# Patient Record
Sex: MALE | Race: WHITE | NOT HISPANIC OR LATINO | Employment: UNEMPLOYED | URBAN - METROPOLITAN AREA
[De-identification: names, ages, dates, MRNs, and addresses within clinical notes are randomized per-mention and may not be internally consistent; named-entity substitution may affect disease eponyms.]

---

## 2018-06-18 ENCOUNTER — OFFICE VISIT (OUTPATIENT)
Dept: FAMILY MEDICINE | Facility: CLINIC | Age: 58
End: 2018-06-18
Payer: COMMERCIAL

## 2018-06-18 VITALS
HEIGHT: 67 IN | WEIGHT: 177 LBS | DIASTOLIC BLOOD PRESSURE: 100 MMHG | BODY MASS INDEX: 27.78 KG/M2 | TEMPERATURE: 99 F | HEART RATE: 80 BPM | RESPIRATION RATE: 16 BRPM | SYSTOLIC BLOOD PRESSURE: 158 MMHG | OXYGEN SATURATION: 97 %

## 2018-06-18 DIAGNOSIS — Z72.0 TOBACCO ABUSE: ICD-10-CM

## 2018-06-18 DIAGNOSIS — R07.89 ATYPICAL CHEST PAIN: ICD-10-CM

## 2018-06-18 DIAGNOSIS — Z00.00 VISIT FOR WELL MAN HEALTH CHECK: Primary | ICD-10-CM

## 2018-06-18 DIAGNOSIS — M54.16 LUMBAR RADICULOPATHY: ICD-10-CM

## 2018-06-18 DIAGNOSIS — I10 BENIGN ESSENTIAL HTN: ICD-10-CM

## 2018-06-18 PROCEDURE — 93005 ELECTROCARDIOGRAM TRACING: CPT | Mod: S$GLB,,, | Performed by: FAMILY MEDICINE

## 2018-06-18 PROCEDURE — 99999 PR PBB SHADOW E&M-EST. PATIENT-LVL IV: CPT | Mod: PBBFAC,,, | Performed by: FAMILY MEDICINE

## 2018-06-18 PROCEDURE — 93010 ELECTROCARDIOGRAM REPORT: CPT | Mod: S$GLB,,, | Performed by: INTERNAL MEDICINE

## 2018-06-18 PROCEDURE — 3080F DIAST BP >= 90 MM HG: CPT | Mod: CPTII,S$GLB,, | Performed by: FAMILY MEDICINE

## 2018-06-18 PROCEDURE — 99386 PREV VISIT NEW AGE 40-64: CPT | Mod: S$GLB,,, | Performed by: FAMILY MEDICINE

## 2018-06-18 PROCEDURE — 3077F SYST BP >= 140 MM HG: CPT | Mod: CPTII,S$GLB,, | Performed by: FAMILY MEDICINE

## 2018-06-18 RX ORDER — HYDROCODONE BITARTRATE AND ACETAMINOPHEN 10; 325 MG/1; MG/1
1 TABLET ORAL EVERY 8 HOURS PRN
Qty: 30 TABLET | Refills: 0 | Status: SHIPPED | OUTPATIENT
Start: 2018-06-18 | End: 2018-10-17 | Stop reason: SDUPTHER

## 2018-06-18 RX ORDER — LISINOPRIL AND HYDROCHLOROTHIAZIDE 12.5; 2 MG/1; MG/1
1 TABLET ORAL DAILY
Qty: 90 TABLET | Refills: 3 | Status: ON HOLD | OUTPATIENT
Start: 2018-06-18 | End: 2018-08-18 | Stop reason: HOSPADM

## 2018-06-18 RX ORDER — IBUPROFEN 200 MG
200 TABLET ORAL EVERY 6 HOURS PRN
COMMUNITY
End: 2018-10-17

## 2018-06-18 NOTE — PROGRESS NOTES
"Well Man VISIT      CHIEF COMPLAINT  Chief Complaint   Patient presents with    \[facility] [place] [facility]\"" Care       HPI  Anjum Campbell is a 58 y.o. male who presents for physical.     Social Factors  Tobacco use: Yes 1ppd  Ready to Quit: No  Alcohol: Yes on occasion  Intimate partner violence screening  "Do you feel safe in your current relationship?" Yes   "Have you ever been in a relationship in which your partner frightened you or hurt you?" No  Living Will/POA: No  Regular Exercise: No    Depression  Over the past two weeks, have you felt down, depressed, or hopeless? No  Over the past two weeks, have you felt little interest or pleasure in doing things? No    Reproductive Health  STD screening in last year: deferred  HIV screening: deferred    Screen for Chronic Disease  CHD Risk Factors: advanced age (older than 55 for men, 65 for women), male gender and smoking/ tobacco exposure  Estimated body mass index is 27.73 kg/m² as calculated from the following:    Height as of this encounter: 5' 7" (1.702 m).    Weight as of this encounter: 80.3 kg (177 lb 0.5 oz).  Dyslipidemia screening needed: order 6/18/18  T2DM screening needed: order 6/18/18  Colonoscopy needed: order 6/18/18  PSA needed: n/a  AAA screening needed:n/a  Screen men 35 years and older, and men 20 to 34 years of age who have cardiovascular risk factors for dyslipidemia  Begin screening colonoscopies at 50 years of age in men of average risk, and continue until 75 years of age; offer fecal occult blood testing every year, flexible sigmoidoscopy every five years combined with fecal occult blood testing every three years, or colonoscopy every 10 years   The American Urological Association recommends offering PSA testing and digital rectal examination to well-informed men beginning at 40 years of age and continuing until life expectancy is less than 10 years  Screen once with ultrasonography in men 65 to 75 years of age if they have a family history or have " "smoked at gyjyi049 cigarettes in their lifetime  Screen men with a sustained blood pressure greater than 135/80 mm Hg for T2DM      Immunizations  delayed    ALLERGIES and MEDS were verified.   PMHx, PSHx, FHx, SOCIALHx were updated as pertinent.    REVIEW OF SYSTEMS  Review of Systems   Constitutional: Negative.    HENT: Negative.    Eyes: Negative.    Respiratory: Negative.    Cardiovascular: Positive for chest pain.   Gastrointestinal: Negative.    Genitourinary: Negative.    Musculoskeletal: Negative.    Skin: Negative.    Neurological: Negative.          PHYSICAL EXAM  VITAL SIGNS: BP (!) 158/100 (BP Location: Left arm, Patient Position: Sitting, BP Method: Medium (Manual))   Pulse 80   Temp 98.7 °F (37.1 °C) (Oral)   Resp 16   Ht 5' 7" (1.702 m)   Wt 80.3 kg (177 lb 0.5 oz)   SpO2 97%   BMI 27.73 kg/m²   GEN: Well developed, Well nourished, No acute distress.  HENT: Normocephalic, Atraumatic, Bilateral external ears normal, Nose normal, Oropharynx moist, No oral exudates.   Eyes: PERRLA, EOMI, Conjunctiva normal, No discharge.   Neck: Supple, No tenderness.  Lymphatic: No cervical or supraclavicular lymphadenopathy noted.   Cardiovascular: Normal heart rate, Normal rhythm, No murmurs, No rubs, No gallops.   Thorax & Lungs: Normal breath sounds, No respiratory distress, No wheezing.  Abdomen: Soft, No tenderness, Bowel sounds normal.  Genital: deferred   Skin: Warm, Dry, No erythema, No rash.   Extremities: No edema, No tenderness.       ASSESSMENT/PLAN    Anjum was seen today for establish care.    Diagnoses and all orders for this visit:    Visit for [facility] health check  -     Lipid panel; Future  -     CBC auto differential; Future  -     Comprehensive metabolic panel; Future  -     URINALYSIS; Future  -     Hepatitis C antibody; Future  -     Case request GI: COLONOSCOPY  -     (In Office Administered) Tdap Vaccine  -     EKG 12-lead    Benign essential HTN  -     (In Office Administered) Tdap " Vaccine  -     lisinopril-hydrochlorothiazide (PRINZIDE,ZESTORETIC) 20-12.5 mg per tablet; Take 1 tablet by mouth once daily.    Tobacco abuse    Atypical chest pain  -     Cancel: NM Myocardial Perfusion Spect Multi Exer; Future  -     Cancel: NM Multi Tread Stress Cardiac Component; Future  -     Ambulatory referral to Cardiology    Lumbar radiculopathy  -     HYDROcodone-acetaminophen (NORCO)  mg per tablet; Take 1 tablet by mouth every 8 (eight) hours as needed.         FOLLOW UP: 3 months or sooner if needed

## 2018-06-19 ENCOUNTER — PATIENT MESSAGE (OUTPATIENT)
Dept: FAMILY MEDICINE | Facility: CLINIC | Age: 58
End: 2018-06-19

## 2018-06-19 NOTE — TELEPHONE ENCOUNTER
Contacted wife re: her questions. She stated she was told her 's stress test was tomorrow but realized it was cancelled and he only has appt with cardiologist. Patient is upset due to all the confusion. I contacted wife back and told her according to Dr. Doe's office notes, the stress test was cancelled and patient has cardiology appt for tomorrow.     Notified Dr. Doe.

## 2018-06-19 NOTE — TELEPHONE ENCOUNTER
----- Message from Kassandra Horton sent at 6/19/2018 10:28 AM CDT -----  Contact: Wife - Barb  Patient's wife says she need to speak with you regarding patient's appointment with Dr. Trevizo and testing. Please call at  [phone].

## 2018-06-20 ENCOUNTER — OFFICE VISIT (OUTPATIENT)
Dept: CARDIOLOGY | Facility: CLINIC | Age: 58
End: 2018-06-20
Payer: COMMERCIAL

## 2018-06-20 ENCOUNTER — LAB VISIT (OUTPATIENT)
Dept: LAB | Facility: HOSPITAL | Age: 58
End: 2018-06-20
Attending: FAMILY MEDICINE
Payer: COMMERCIAL

## 2018-06-20 VITALS
RESPIRATION RATE: 20 BRPM | DIASTOLIC BLOOD PRESSURE: 88 MMHG | HEART RATE: 86 BPM | SYSTOLIC BLOOD PRESSURE: 140 MMHG | BODY MASS INDEX: 27.62 KG/M2 | WEIGHT: 176.38 LBS | OXYGEN SATURATION: 96 %

## 2018-06-20 DIAGNOSIS — R07.9 CHEST PAIN, UNSPECIFIED TYPE: Primary | ICD-10-CM

## 2018-06-20 DIAGNOSIS — I10 ESSENTIAL HYPERTENSION: ICD-10-CM

## 2018-06-20 DIAGNOSIS — E78.5 DYSLIPIDEMIA: ICD-10-CM

## 2018-06-20 DIAGNOSIS — Z91.89 AT RISK FOR CORONARY ARTERY DISEASE: ICD-10-CM

## 2018-06-20 DIAGNOSIS — M51.34 DDD (DEGENERATIVE DISC DISEASE), THORACIC: ICD-10-CM

## 2018-06-20 DIAGNOSIS — Z72.0 TOBACCO ABUSE: ICD-10-CM

## 2018-06-20 DIAGNOSIS — Z00.00 VISIT FOR WELL MAN HEALTH CHECK: ICD-10-CM

## 2018-06-20 LAB
ALBUMIN SERPL BCP-MCNC: 4.1 G/DL
ALP SERPL-CCNC: 74 U/L
ALT SERPL W/O P-5'-P-CCNC: 19 U/L
ANION GAP SERPL CALC-SCNC: 6 MMOL/L
AST SERPL-CCNC: 16 U/L
BASOPHILS # BLD AUTO: 0.06 K/UL
BASOPHILS NFR BLD: 0.7 %
BILIRUB SERPL-MCNC: 0.7 MG/DL
BUN SERPL-MCNC: 18 MG/DL
CALCIUM SERPL-MCNC: 9.9 MG/DL
CHLORIDE SERPL-SCNC: 100 MMOL/L
CHOLEST SERPL-MCNC: 202 MG/DL
CHOLEST/HDLC SERPL: 5.5 {RATIO}
CO2 SERPL-SCNC: 31 MMOL/L
CREAT SERPL-MCNC: 1.1 MG/DL
DIFFERENTIAL METHOD: NORMAL
EOSINOPHIL # BLD AUTO: 0.3 K/UL
EOSINOPHIL NFR BLD: 3.7 %
ERYTHROCYTE [DISTWIDTH] IN BLOOD BY AUTOMATED COUNT: 14 %
EST. GFR  (AFRICAN AMERICAN): >60 ML/MIN/1.73 M^2
EST. GFR  (NON AFRICAN AMERICAN): >60 ML/MIN/1.73 M^2
GLUCOSE SERPL-MCNC: 83 MG/DL
HCT VFR BLD AUTO: 50.3 %
HDLC SERPL-MCNC: 37 MG/DL
HDLC SERPL: 18.3 %
HGB BLD-MCNC: 16.8 G/DL
LDLC SERPL CALC-MCNC: 132.2 MG/DL
LYMPHOCYTES # BLD AUTO: 2.9 K/UL
LYMPHOCYTES NFR BLD: 34.7 %
MCH RBC QN AUTO: 29.7 PG
MCHC RBC AUTO-ENTMCNC: 33.4 G/DL
MCV RBC AUTO: 89 FL
MONOCYTES # BLD AUTO: 0.8 K/UL
MONOCYTES NFR BLD: 9.3 %
NEUTROPHILS # BLD AUTO: 4.4 K/UL
NEUTROPHILS NFR BLD: 51.4 %
NONHDLC SERPL-MCNC: 165 MG/DL
PLATELET # BLD AUTO: 253 K/UL
PMV BLD AUTO: 10.4 FL
POTASSIUM SERPL-SCNC: 4.1 MMOL/L
PROT SERPL-MCNC: 7.3 G/DL
RBC # BLD AUTO: 5.65 M/UL
SODIUM SERPL-SCNC: 137 MMOL/L
TRIGL SERPL-MCNC: 164 MG/DL
WBC # BLD AUTO: 8.47 K/UL

## 2018-06-20 PROCEDURE — 3079F DIAST BP 80-89 MM HG: CPT | Mod: CPTII,S$GLB,, | Performed by: INTERNAL MEDICINE

## 2018-06-20 PROCEDURE — 99204 OFFICE O/P NEW MOD 45 MIN: CPT | Mod: S$GLB,,, | Performed by: INTERNAL MEDICINE

## 2018-06-20 PROCEDURE — 80053 COMPREHEN METABOLIC PANEL: CPT

## 2018-06-20 PROCEDURE — 80061 LIPID PANEL: CPT

## 2018-06-20 PROCEDURE — 3008F BODY MASS INDEX DOCD: CPT | Mod: CPTII,S$GLB,, | Performed by: INTERNAL MEDICINE

## 2018-06-20 PROCEDURE — 86803 HEPATITIS C AB TEST: CPT

## 2018-06-20 PROCEDURE — 3077F SYST BP >= 140 MM HG: CPT | Mod: CPTII,S$GLB,, | Performed by: INTERNAL MEDICINE

## 2018-06-20 PROCEDURE — 85025 COMPLETE CBC W/AUTO DIFF WBC: CPT

## 2018-06-20 PROCEDURE — 36415 COLL VENOUS BLD VENIPUNCTURE: CPT | Mod: PN

## 2018-06-20 PROCEDURE — 99999 PR PBB SHADOW E&M-EST. PATIENT-LVL III: CPT | Mod: PBBFAC,,, | Performed by: INTERNAL MEDICINE

## 2018-06-20 RX ORDER — NITROGLYCERIN 0.4 MG/1
0.4 TABLET SUBLINGUAL EVERY 5 MIN PRN
Qty: 60 TABLET | Refills: 12 | Status: ON HOLD | OUTPATIENT
Start: 2018-06-20 | End: 2018-08-18 | Stop reason: HOSPADM

## 2018-06-20 NOTE — PROGRESS NOTES
Subjective:    Patient ID:  Anjum Campbell is a 58 y.o. male who presents for evaluation of Establish Care (abnormal ekg)      HPI  Patient is here for evaluation of chest pain.  He says his been gone for a few weeks is noticeably more prominently occurring almost a point daily episodes.  He has substernal chest heaviness with radiation into his jaw.  He has some associated shortness breath as well.  This can occur with exertion and rest.  He says not particularly after meals.  He really hasn't tried anything to make it better or worse.  He denies any PND, orthopnea or lower edema.  He's not expressing dizziness, presyncope or syncope.  He's never had any previous cardiac issues or testing done.  Says he smokes almost 3/4 of a pack a day.      Review of Systems   Constitution: Negative.   HENT: Negative.    Eyes: Negative.    Cardiovascular: Positive for chest pain. Negative for dyspnea on exertion, irregular heartbeat, leg swelling, near-syncope, orthopnea, palpitations, paroxysmal nocturnal dyspnea and syncope.   Respiratory: Negative for shortness of breath.    Skin: Negative.    Musculoskeletal: Negative.    Gastrointestinal: Negative for abdominal pain, constipation and diarrhea.   Genitourinary: Negative for dysuria.   Neurological: Negative for dizziness.   Psychiatric/Behavioral: Negative.      Past Medical History:   Diagnosis Date    Back injury 1994    Hypertension      Past Surgical History:   Procedure Laterality Date    back fusion   1995    Lumber surgery  1995     Social History   Substance Use Topics    Smoking status: Current Every Day Smoker     Packs/day: 1.00     Years: 30.00     Types: Cigarettes    Smokeless tobacco: Not on file    Alcohol use Yes      Comment: occaSIONAL     Family History   Problem Relation Age of Onset    Cancer Father         Objective:    Physical Exam   Constitutional: He is oriented to person, place, and time. He appears well-developed and well-nourished. No  distress.   HENT:   Head: Normocephalic and atraumatic.   Eyes: Conjunctivae and EOM are normal. Pupils are equal, round, and reactive to light.   Neck: Normal range of motion. Neck supple. No thyromegaly present.   Cardiovascular: Normal rate, regular rhythm and normal heart sounds.    No murmur heard.  Pulmonary/Chest: Effort normal and breath sounds normal. No respiratory distress. He has no wheezes. He has no rales. He exhibits no tenderness.   Abdominal: Soft. Bowel sounds are normal.   Musculoskeletal: He exhibits no edema.   Neurological: He is alert and oriented to person, place, and time.   Skin: Skin is warm and dry.   Psychiatric: He has a normal mood and affect. His behavior is normal.       EKG shows normal sinus rhythm with nonspecific ST-T changes    Assessment:       1. Chest pain, unspecified type    2. Essential hypertension    3. Dyslipidemia    4. DDD (degenerative disc disease), thoracic    5. Tobacco abuse    6. At risk for coronary artery disease         Plan:       -Very typical anginal symptoms, plan for testing ASAP  *Cannot exercise secondary to chronic degenerative arthritic issues  -Counseled on tobacco cessation  -When necessary nitroglycerin  -ED for worsening symptoms    Return to clinic in one month with testing ASAP

## 2018-06-20 NOTE — LETTER
June 20, 2018      Enmanuel Doe MD  [address]  [place] [address]           [facility]  [address]  [place] [address]  Phone: [phone]          Patient: Anjum Campbell   MR Number: [id number]   YOB: 1960   Date of Visit: 6/20/2018       Dear Dr. Enmanuel MARTINEZ Page:    Thank you for referring Anjum Campbell to me for evaluation. Attached you will find relevant portions of my assessment and plan of care.    If you have questions, please do not hesitate to call me. I look forward to following Anjum Campbell along with you.    Sincerely,    Gerardo Trevizo MD    Enclosure  CC:  No Recipients    If you would like to receive this communication electronically, please contact [email or web address] or [phone] to request more information on [employer] access.    For providers and/or their staff who would like to refer a patient to [facility], please contact us through our one-stop-shop provider referral line, [facility], at [phone].    If you feel you have received this communication in error or would no longer like to receive these types of communications, please e-mail [email or web address]

## 2018-06-21 DIAGNOSIS — I10 BENIGN ESSENTIAL HTN: ICD-10-CM

## 2018-06-21 DIAGNOSIS — E78.00 PURE HYPERCHOLESTEROLEMIA: Primary | ICD-10-CM

## 2018-06-21 PROBLEM — E78.5 HYPERLIPIDEMIA: Status: ACTIVE | Noted: 2018-06-21

## 2018-06-21 RX ORDER — ATORVASTATIN CALCIUM 40 MG/1
40 TABLET, FILM COATED ORAL DAILY
Qty: 90 TABLET | Refills: 3 | Status: ON HOLD | OUTPATIENT
Start: 2018-06-21 | End: 2018-08-18 | Stop reason: SDUPTHER

## 2018-06-22 ENCOUNTER — HOSPITAL ENCOUNTER (OUTPATIENT)
Dept: CARDIOLOGY | Facility: HOSPITAL | Age: 58
Discharge: HOME | End: 2018-06-22
Attending: INTERNAL MEDICINE
Payer: COMMERCIAL

## 2018-06-22 ENCOUNTER — HOSPITAL ENCOUNTER (OUTPATIENT)
Dept: RADIOLOGY | Facility: HOSPITAL | Age: 58
Discharge: HOME | End: 2018-06-22
Attending: INTERNAL MEDICINE
Payer: COMMERCIAL

## 2018-06-22 DIAGNOSIS — R07.9 CHEST PAIN, UNSPECIFIED TYPE: ICD-10-CM

## 2018-06-22 DIAGNOSIS — Z91.89 AT RISK FOR CORONARY ARTERY DISEASE: ICD-10-CM

## 2018-06-22 LAB
DIASTOLIC DYSFUNCTION: NO
DIASTOLIC DYSFUNCTION: YES
ESTIMATED PA SYSTOLIC PRESSURE: 21.54
GLOBAL PERICARDIAL EFFUSION: ABNORMAL
HCV AB SERPL QL IA: NEGATIVE
MITRAL VALVE MOBILITY: NORMAL
RETIRED EF AND QEF - SEE NOTES: 30 (ref 55–65)

## 2018-06-22 PROCEDURE — A9502 TC99M TETROFOSMIN: HCPCS

## 2018-06-22 PROCEDURE — 93306 TTE W/DOPPLER COMPLETE: CPT | Mod: 26,,, | Performed by: INTERNAL MEDICINE

## 2018-06-22 PROCEDURE — 78452 HT MUSCLE IMAGE SPECT MULT: CPT | Mod: 26,,, | Performed by: INTERNAL MEDICINE

## 2018-06-22 PROCEDURE — 63600175 PHARM REV CODE 636 W HCPCS

## 2018-06-22 PROCEDURE — 93016 CV STRESS TEST SUPVJ ONLY: CPT | Mod: ,,, | Performed by: INTERNAL MEDICINE

## 2018-06-22 PROCEDURE — 93017 CV STRESS TEST TRACING ONLY: CPT

## 2018-06-22 PROCEDURE — 93306 TTE W/DOPPLER COMPLETE: CPT

## 2018-06-22 PROCEDURE — 93018 CV STRESS TEST I&R ONLY: CPT | Mod: ,,, | Performed by: INTERNAL MEDICINE

## 2018-06-22 RX ORDER — REGADENOSON 0.08 MG/ML
INJECTION, SOLUTION INTRAVENOUS
Status: DISPENSED
Start: 2018-06-22 | End: 2018-06-22

## 2018-06-25 ENCOUNTER — OFFICE VISIT (OUTPATIENT)
Dept: CARDIOLOGY | Facility: CLINIC | Age: 58
End: 2018-06-25
Payer: COMMERCIAL

## 2018-06-25 VITALS
OXYGEN SATURATION: 98 % | RESPIRATION RATE: 20 BRPM | BODY MASS INDEX: 27.62 KG/M2 | HEART RATE: 105 BPM | WEIGHT: 176.38 LBS | DIASTOLIC BLOOD PRESSURE: 86 MMHG | SYSTOLIC BLOOD PRESSURE: 154 MMHG

## 2018-06-25 DIAGNOSIS — E78.5 DYSLIPIDEMIA: ICD-10-CM

## 2018-06-25 DIAGNOSIS — M51.34 DDD (DEGENERATIVE DISC DISEASE), THORACIC: ICD-10-CM

## 2018-06-25 DIAGNOSIS — Z91.89 AT RISK FOR CORONARY ARTERY DISEASE: ICD-10-CM

## 2018-06-25 DIAGNOSIS — R07.9 CHEST PAIN, UNSPECIFIED TYPE: Primary | ICD-10-CM

## 2018-06-25 DIAGNOSIS — Z72.0 TOBACCO ABUSE: ICD-10-CM

## 2018-06-25 DIAGNOSIS — I50.22 CHRONIC SYSTOLIC CONGESTIVE HEART FAILURE: ICD-10-CM

## 2018-06-25 DIAGNOSIS — I10 ESSENTIAL HYPERTENSION: ICD-10-CM

## 2018-06-25 PROCEDURE — 3008F BODY MASS INDEX DOCD: CPT | Mod: CPTII,S$GLB,, | Performed by: INTERNAL MEDICINE

## 2018-06-25 PROCEDURE — 99214 OFFICE O/P EST MOD 30 MIN: CPT | Mod: S$GLB,,, | Performed by: INTERNAL MEDICINE

## 2018-06-25 PROCEDURE — 3077F SYST BP >= 140 MM HG: CPT | Mod: CPTII,S$GLB,, | Performed by: INTERNAL MEDICINE

## 2018-06-25 PROCEDURE — 3079F DIAST BP 80-89 MM HG: CPT | Mod: CPTII,S$GLB,, | Performed by: INTERNAL MEDICINE

## 2018-06-25 PROCEDURE — 99999 PR PBB SHADOW E&M-EST. PATIENT-LVL III: CPT | Mod: PBBFAC,,, | Performed by: INTERNAL MEDICINE

## 2018-06-25 RX ORDER — CLOPIDOGREL BISULFATE 75 MG/1
75 TABLET ORAL DAILY
Qty: 30 TABLET | Refills: 11 | Status: SHIPPED | OUTPATIENT
Start: 2018-06-25 | End: 2018-07-26 | Stop reason: SDUPTHER

## 2018-06-25 RX ORDER — CARVEDILOL 12.5 MG/1
12.5 TABLET ORAL 2 TIMES DAILY WITH MEALS
Qty: 30 TABLET | Refills: 3 | Status: ON HOLD | OUTPATIENT
Start: 2018-06-25 | End: 2018-08-18 | Stop reason: SDUPTHER

## 2018-06-25 NOTE — PROGRESS NOTES
Subjective:    Patient ID:  Anjum Campbell is a 58 y.o. male who presents for follow-up of Results      HPI  Patient is here for follow-up of chest pain and abnormal stress test.  He was newly diagnosed cardiomyopathy and likely ischemic etiology in the LAD.  He had a couple of chest pain episodes since we last saw him relieved with nitroglycerin.  He's taken a total of 4 over the course of the weekend.  He denies any worsening cardiopulmonary complaints otherwise.  He's express no PND, orthopnea or lower edema.  He hasn't expressing dizziness, presyncope or syncope.    Review of Systems   Constitution: Negative.   HENT: Negative.    Eyes: Negative.    Cardiovascular: Positive for chest pain. Negative for dyspnea on exertion, irregular heartbeat, leg swelling, near-syncope, orthopnea, palpitations, paroxysmal nocturnal dyspnea and syncope.   Respiratory: Negative for shortness of breath.    Skin: Negative.    Musculoskeletal: Negative.    Gastrointestinal: Negative for abdominal pain, constipation and diarrhea.   Genitourinary: Negative for dysuria.   Neurological: Negative for dizziness.   Psychiatric/Behavioral: Negative.         Objective:    Physical Exam   Constitutional: He is oriented to person, place, and time. He appears well-developed and well-nourished. No distress.   HENT:   Head: Normocephalic and atraumatic.   Eyes: Conjunctivae and EOM are normal. Pupils are equal, round, and reactive to light.   Neck: Normal range of motion. Neck supple. No thyromegaly present.   Cardiovascular: Normal rate, regular rhythm and normal heart sounds.    No murmur heard.  Pulmonary/Chest: Effort normal and breath sounds normal. No respiratory distress. He has no wheezes. He has no rales. He exhibits no tenderness.   Abdominal: Soft. Bowel sounds are normal.   Musculoskeletal: He exhibits no edema.   Neurological: He is alert and oriented to person, place, and time.   Skin: Skin is warm and dry.   Psychiatric: He has a  normal mood and affect. His behavior is normal.       echo:  6-18  Impression: ABNORMAL MYOCARDIAL PERFUSION  1. There is a moderate to large size fixed defect of moderate intensity that extends from the base to the apical inferoseptal wall of the left ventricle, consistent with myocardial injury. There is trivial maverick-injury ischemia.   2. There is abnormal wall motion at rest showing moderate to severe global hypokinesis of the left ventricle.   3. There is resting LV dysfunction with a reduced ejection fraction of 28 %.   4. The ventricular volumes are normal at rest and stress.   5. The extracardiac distribution of radioactivity is normal.     NST:  Impression: ABNORMAL MYOCARDIAL PERFUSION  1. There is a moderate to large size fixed defect of moderate intensity that extends from the base to the apical inferoseptal wall of the left ventricle, consistent with myocardial injury. There is trivial maverick-injury ischemia.   2. There is abnormal wall motion at rest showing moderate to severe global hypokinesis of the left ventricle.   3. There is resting LV dysfunction with a reduced ejection fraction of 28 %.   4. The ventricular volumes are normal at rest and stress.   5. The extracardiac distribution of radioactivity is normal.     Assessment:       1. Chest pain, unspecified type    2. At risk for coronary artery disease    3. Essential hypertension    4. Dyslipidemia    5. DDD (degenerative disc disease), thoracic    6. Tobacco abuse    7. Chronic systolic congestive heart failure         Plan:       -Intermediate to high risk stress test with newly diagnosed cardiomyopathy and CCS 3, plan for baseline right and left heart catheterization ASAP  -Add carvedilol  -Load on Plavix  -Counseled on tobacco cessation  -ED for worsening problems    Return to clinic after the procedure    **Risks/benefits of the procedure were d/w the patient including bleeding, infection, death, mi, arrhythmia, kidney failure, stroke, etc.   Patient understands and consent was placed on the chart.

## 2018-06-27 ENCOUNTER — HOSPITAL ENCOUNTER (OUTPATIENT)
Dept: PREADMISSION TESTING | Facility: HOSPITAL | Age: 58
Discharge: HOME | End: 2018-06-27
Attending: INTERNAL MEDICINE
Payer: COMMERCIAL

## 2018-06-27 VITALS
RESPIRATION RATE: 18 BRPM | HEIGHT: 68 IN | WEIGHT: 173 LBS | DIASTOLIC BLOOD PRESSURE: 85 MMHG | BODY MASS INDEX: 26.22 KG/M2 | HEART RATE: 96 BPM | OXYGEN SATURATION: 98 % | SYSTOLIC BLOOD PRESSURE: 132 MMHG

## 2018-06-27 DIAGNOSIS — I50.22 CHRONIC SYSTOLIC CONGESTIVE HEART FAILURE: ICD-10-CM

## 2018-06-27 DIAGNOSIS — R07.9 CHEST PAIN, UNSPECIFIED TYPE: ICD-10-CM

## 2018-06-27 LAB
ANION GAP SERPL CALC-SCNC: 5 MMOL/L
BASOPHILS # BLD AUTO: 0.07 K/UL
BASOPHILS NFR BLD: 0.7 %
BUN SERPL-MCNC: 28 MG/DL
CALCIUM SERPL-MCNC: 9.8 MG/DL
CHLORIDE SERPL-SCNC: 101 MMOL/L
CO2 SERPL-SCNC: 32 MMOL/L
CREAT SERPL-MCNC: 1.2 MG/DL
DIFFERENTIAL METHOD: NORMAL
EOSINOPHIL # BLD AUTO: 0.2 K/UL
EOSINOPHIL NFR BLD: 2.5 %
ERYTHROCYTE [DISTWIDTH] IN BLOOD BY AUTOMATED COUNT: 13.3 %
EST. GFR  (AFRICAN AMERICAN): >60 ML/MIN/1.73 M^2
EST. GFR  (NON AFRICAN AMERICAN): >60 ML/MIN/1.73 M^2
GLUCOSE SERPL-MCNC: 98 MG/DL
HCT VFR BLD AUTO: 46.6 %
HGB BLD-MCNC: 16.1 G/DL
INR PPP: 1
LYMPHOCYTES # BLD AUTO: 2.4 K/UL
LYMPHOCYTES NFR BLD: 25.1 %
MCH RBC QN AUTO: 30.2 PG
MCHC RBC AUTO-ENTMCNC: 34.5 G/DL
MCV RBC AUTO: 87 FL
MONOCYTES # BLD AUTO: 0.8 K/UL
MONOCYTES NFR BLD: 7.9 %
NEUTROPHILS # BLD AUTO: 6.1 K/UL
NEUTROPHILS NFR BLD: 63.6 %
PLATELET # BLD AUTO: 252 K/UL
PMV BLD AUTO: 10.1 FL
POTASSIUM SERPL-SCNC: 4.2 MMOL/L
PROTHROMBIN TIME: 10.6 SEC
RBC # BLD AUTO: 5.33 M/UL
SODIUM SERPL-SCNC: 138 MMOL/L
WBC # BLD AUTO: 9.54 K/UL

## 2018-06-27 PROCEDURE — 93005 ELECTROCARDIOGRAM TRACING: CPT

## 2018-06-27 PROCEDURE — 36415 COLL VENOUS BLD VENIPUNCTURE: CPT

## 2018-06-27 PROCEDURE — 85610 PROTHROMBIN TIME: CPT

## 2018-06-27 PROCEDURE — 93010 ELECTROCARDIOGRAM REPORT: CPT | Mod: ,,, | Performed by: INTERNAL MEDICINE

## 2018-06-27 PROCEDURE — 80048 BASIC METABOLIC PNL TOTAL CA: CPT

## 2018-06-27 PROCEDURE — 85025 COMPLETE CBC W/AUTO DIFF WBC: CPT

## 2018-06-27 NOTE — DISCHARGE INSTRUCTIONS
Your angiogram is scheduled for__6/28/2018_____________    Call [phone] between 2 pm and 5 pm on __today__________to find out your arrival time for the day of your procedure.    You will go directly to Same Day Surgery on the 2nd floor of the hospital on the day of your procedure at __________    If you need wheelchair assistance, call [phone] from the lobby using your cell phone.  Or you may use the courtesy phone in the lobby.  The  will direct your call to the Same Day Surgery unit.    IMPORTANT INSTRUCTIONS:  Do not eat or drink anything after midnight.  This includes water and coffee.  It is okay to brush your teeth.  Do not chew gum or have hard candy or mints.    Take your morning medications with small sips of water.        Wash both groins with Hibiclens on the night before your angiogram, and on the morning of your angiogram.  If your doctor has told you that the wrist area will be used for the procedure, wash both wrists.  Be sure to rinse it off.  Do not put Hibiclens directly on your genitals or face.     Do not wear make-up.     You may wear deodorant, but do not wear body lotion, powder or cologne       Do not wear jewelry.     You do not need money or valuables.  You may bring your cell phone.     If you are going home the same day, you must arrange for someone to drive you home.     Wear comfortable, loose fitting clothes.     Call your doctor if you have sickness or fever before your angiogram.     Our number in [facility] is [phone] if you need to contact us.

## 2018-06-27 NOTE — H&P
HPI:  Patient is here for follow-up of chest pain and abnormal stress test.  He was newly diagnosed cardiomyopathy and likely ischemic etiology in the LAD.  He had a couple of chest pain episodes since we last saw him relieved with nitroglycerin.  He's taken a total of 4 over the course of the weekend.  He denies any worsening cardiopulmonary complaints otherwise.  He's express no PND, orthopnea or lower edema.  He hasn't expressing dizziness, presyncope or syncope.     Review of Systems   Constitution: Negative.   HENT: Negative.    Eyes: Negative.    Cardiovascular: Positive for chest pain. Negative for dyspnea on exertion, irregular heartbeat, leg swelling, near-syncope, orthopnea, palpitations, paroxysmal nocturnal dyspnea and syncope.   Respiratory: Negative for shortness of breath.    Skin: Negative.    Musculoskeletal: Negative.    Gastrointestinal: Negative for abdominal pain, constipation and diarrhea.   Genitourinary: Negative for dysuria.   Neurological: Negative for dizziness.   Psychiatric/Behavioral: Negative.       Objective:    Physical Exam   Constitutional: He is oriented to person, place, and time. He appears well-developed and well-nourished. No distress.   HENT:   Head: Normocephalic and atraumatic.   Eyes: Conjunctivae and EOM are normal. Pupils are equal, round, and reactive to light.   Neck: Normal range of motion. Neck supple. No thyromegaly present.   Cardiovascular: Normal rate, regular rhythm and normal heart sounds.    No murmur heard.  Pulmonary/Chest: Effort normal and breath sounds normal. No respiratory distress. He has no wheezes. He has no rales. He exhibits no tenderness.   Abdominal: Soft. Bowel sounds are normal.   Musculoskeletal: He exhibits no edema.   Neurological: He is alert and oriented to person, place, and time.   Skin: Skin is warm and dry.   Psychiatric: He has a normal mood and affect. His behavior is normal.        echo:  6-18  Impression: ABNORMAL MYOCARDIAL  PERFUSION  1. There is a moderate to large size fixed defect of moderate intensity that extends from the base to the apical inferoseptal wall of the left ventricle, consistent with myocardial injury. There is trivial maverick-injury ischemia.   2. There is abnormal wall motion at rest showing moderate to severe global hypokinesis of the left ventricle.   3. There is resting LV dysfunction with a reduced ejection fraction of 28 %.   4. The ventricular volumes are normal at rest and stress.   5. The extracardiac distribution of radioactivity is normal.      NST:  Impression: ABNORMAL MYOCARDIAL PERFUSION  1. There is a moderate to large size fixed defect of moderate intensity that extends from the base to the apical inferoseptal wall of the left ventricle, consistent with myocardial injury. There is trivial maverick-injury ischemia.   2. There is abnormal wall motion at rest showing moderate to severe global hypokinesis of the left ventricle.   3. There is resting LV dysfunction with a reduced ejection fraction of 28 %.   4. The ventricular volumes are normal at rest and stress.   5. The extracardiac distribution of radioactivity is normal.      Assessment:       1. Chest pain, unspecified type    2. At risk for coronary artery disease    3. Essential hypertension    4. Dyslipidemia    5. DDD (degenerative disc disease), thoracic    6. Tobacco abuse    7. Chronic systolic congestive heart failure       Plan:       -Intermediate to high risk stress test with newly diagnosed cardiomyopathy and CCS 3, plan for baseline right and left heart catheterization ASAP  -Add carvedilol  -Load on Plavix  -Counseled on tobacco cessation  -ED for worsening problems     Return to clinic after the procedure     **Risks/benefits of the procedure were d/w the patient including bleeding, infection, death, mi, arrhythmia, kidney failure, stroke, etc.  Patient understands and consent was placed on the chart.

## 2018-06-28 ENCOUNTER — SURGERY (OUTPATIENT)
Age: 58
End: 2018-06-28

## 2018-06-28 ENCOUNTER — PATIENT MESSAGE (OUTPATIENT)
Dept: CARDIOLOGY | Facility: HOSPITAL | Age: 58
End: 2018-06-28

## 2018-06-28 ENCOUNTER — HOSPITAL ENCOUNTER (OUTPATIENT)
Facility: HOSPITAL | Age: 58
Discharge: HOME | End: 2018-06-28
Attending: INTERNAL MEDICINE | Admitting: INTERNAL MEDICINE
Payer: COMMERCIAL

## 2018-06-28 ENCOUNTER — PATIENT MESSAGE (OUTPATIENT)
Dept: CARDIOLOGY | Facility: CLINIC | Age: 58
End: 2018-06-28

## 2018-06-28 VITALS
DIASTOLIC BLOOD PRESSURE: 52 MMHG | WEIGHT: 173 LBS | HEART RATE: 89 BPM | OXYGEN SATURATION: 97 % | HEIGHT: 68 IN | SYSTOLIC BLOOD PRESSURE: 90 MMHG | RESPIRATION RATE: 18 BRPM | BODY MASS INDEX: 26.22 KG/M2 | TEMPERATURE: 98 F

## 2018-06-28 DIAGNOSIS — M47.816 SPONDYLOSIS OF LUMBAR REGION WITHOUT MYELOPATHY OR RADICULOPATHY: ICD-10-CM

## 2018-06-28 DIAGNOSIS — R07.9 CHEST PAIN, UNSPECIFIED TYPE: ICD-10-CM

## 2018-06-28 DIAGNOSIS — I25.10 CORONARY ARTERY DISEASE INVOLVING NATIVE CORONARY ARTERY, ANGINA PRESENCE UNSPECIFIED, UNSPECIFIED WHETHER NATIVE OR TRANSPLANTED HEART: Primary | ICD-10-CM

## 2018-06-28 DIAGNOSIS — I50.22 CHRONIC SYSTOLIC CONGESTIVE HEART FAILURE: ICD-10-CM

## 2018-06-28 LAB
ALLENS TEST: ABNORMAL
CORONARY STENOSIS: ABNORMAL
HCO3 UR-SCNC: 29.8 MMOL/L (ref 24–28)
PCO2 BLDA: 52.6 MMHG (ref 35–45)
PH SMN: 7.36 [PH] (ref 7.35–7.45)
PO2 BLDA: 37 MMHG (ref 40–60)
POC BE: 3 MMOL/L
POC SATURATED O2: 66 % (ref 95–100)
POC TCO2: 31 MMOL/L (ref 24–29)
SAMPLE: ABNORMAL

## 2018-06-28 PROCEDURE — 25000003 PHARM REV CODE 250

## 2018-06-28 PROCEDURE — G0269 OCCLUSIVE DEVICE IN VEIN ART: HCPCS

## 2018-06-28 PROCEDURE — 99152 MOD SED SAME PHYS/QHP 5/>YRS: CPT

## 2018-06-28 PROCEDURE — 63600175 PHARM REV CODE 636 W HCPCS

## 2018-06-28 PROCEDURE — 93460 R&L HRT ART/VENTRICLE ANGIO: CPT | Mod: 26,,, | Performed by: INTERNAL MEDICINE

## 2018-06-28 PROCEDURE — 99152 MOD SED SAME PHYS/QHP 5/>YRS: CPT | Mod: ,,, | Performed by: INTERNAL MEDICINE

## 2018-06-28 PROCEDURE — 99900035 HC TECH TIME PER 15 MIN (STAT)

## 2018-06-28 PROCEDURE — 25500020 PHARM REV CODE 255

## 2018-06-28 RX ORDER — HYDROCODONE BITARTRATE AND ACETAMINOPHEN 5; 325 MG/1; MG/1
1 TABLET ORAL EVERY 4 HOURS PRN
Status: DISCONTINUED | OUTPATIENT
Start: 2018-06-28 | End: 2018-06-28 | Stop reason: HOSPADM

## 2018-06-28 RX ORDER — NAPROXEN SODIUM 220 MG/1
81 TABLET, FILM COATED ORAL DAILY
Status: ON HOLD | COMMUNITY
End: 2018-08-18 | Stop reason: HOSPADM

## 2018-06-28 RX ORDER — ISOSORBIDE MONONITRATE 30 MG/1
30 TABLET, EXTENDED RELEASE ORAL DAILY
Qty: 30 TABLET | Refills: 3 | Status: ON HOLD | OUTPATIENT
Start: 2018-06-28 | End: 2018-08-18 | Stop reason: HOSPADM

## 2018-06-28 NOTE — DISCHARGE SUMMARY
[facility] Hospital Medicine  Discharge Summary      Patient Name: Anjum Campbell  MRN: [id number]  Admission Date: 6/28/2018  Hospital Length of Stay: 0 days  Discharge Date and Time:  06/28/2018 8:48 AM  Attending Physician: Gerardo Trevizo MD   Discharging Provider: Gerardo Trevizo MD  Primary Care Provider: Enmanuel Doe MD        HPI: Patient is a 58-year-old male who presented with very typical anginal symptoms history of tobacco use disorder found to have moderately reduced EF and high risk stress test and LAD territory.  Yousif in for evaluation coronary anatomy.  See admission H&P full details.    Procedure(s) (LRB):  Heart Cath-Bilateral (Bilateral)      Hospital Course: Patient was admitted electively for right R catheterization.  He's found to have normal filling pressures and severe proximal LAD disease extending back to the ostium.  Due to his depressed EF and high-grade lesion was initially will plan for CT surgery consult and will discuss risk benefits of LIMA to LAD versus staged PCI with stent.  He's to continue his current medical therapy.  Again we emphasized tobacco cessation.  We will add Imdur and continue Plavix and aspirin at this time.    Consults:  CT surgery    Final Active Diagnoses:    Diagnosis Date Noted POA    PRINCIPAL PROBLEM:  CAD (coronary artery disease) [I25.10] 06/28/2018 Yes    Chest pain [R07.9] 06/28/2018 Yes      Problems Resolved During this Admission:    Diagnosis Date Noted Date Resolved POA      Discharged Condition: good    Disposition:  home    Follow Up:  Follow-up Information     Gerardo Trevizo MD In 1 week.    Specialties:  INTERVENTIONAL CARDIOLOGY, Cardiology  Contact information:  [address] [place], [place] [id number] [phone]                 Patient Instructions:   Diet cardiac  Activity as tolerated  Routine post-catheterization instructions given      Ambulatory referral to Cardiothoracic Surgery   Referral Priority:  Urgent Referral Type: Consultation   Referral Reason: Specialty Services Required    Requested Specialty: Cardiothoracic Surgery    Number of Visits Requested: 1        Medications:  Reconciled Home Medications:      Medication List      START taking these medications    isosorbide mononitrate 30 MG 24 hr tablet  Commonly known as:  IMDUR  Take 1 tablet (30 mg total) by mouth once daily.        CONTINUE taking these medications    aspirin 81 MG Chew  Take 81 mg by mouth once daily.     atorvastatin 40 MG tablet  Commonly known as:  LIPITOR  Take 1 tablet (40 mg total) by mouth once daily.     carvedilol 12.5 MG tablet  Commonly known as:  COREG  Take 1 tablet (12.5 mg total) by mouth 2 (two) times daily with meals.     clopidogrel 75 mg tablet  Commonly known as:  PLAVIX  Take 1 tablet (75 mg total) by mouth once daily.     HYDROcodone-acetaminophen  mg per tablet  Commonly known as:  NORCO  Take 1 tablet by mouth every 8 (eight) hours as needed.     ibuprofen 200 MG tablet  Commonly known as:  ADVIL,MOTRIN  Take 200 mg by mouth every 6 (six) hours as needed for Pain.     lisinopril-hydrochlorothiazide 20-12.5 mg per tablet  Commonly known as:  PRINZIDE,ZESTORETIC  Take 1 tablet by mouth once daily.     nitroGLYCERIN 0.4 MG SL tablet  Commonly known as:  NITROSTAT  Place 1 tablet (0.4 mg total) under the tongue every 5 (five) minutes as needed for Chest pain.            Significant Diagnostic Studies: Labs:   BMP:   Recent Labs  Lab 06/27/18  1045   GLU 98      K 4.2      CO2 32*   BUN 28*   CREATININE 1.2   CALCIUM 9.8       Pending Diagnostic Studies:     None        Indwelling Lines/Drains at time of discharge:   Lines/Drains/Airways          No matching active lines, drains, or airways          Time spent on the discharge of patient: 30 minutes  Patient was seen and examined on the date of discharge and determined to be suitable for discharge.         Gerardo Trevizo MD  Department of [facility]  Medicine  [facility]

## 2018-06-28 NOTE — DISCHARGE INSTRUCTIONS
ANGIOGRAM INSTRUCTIONS                                           Drink plenty of fluids for the next 48 hours and follow your doctor's diet orders.    Rest for the next 72 hours.   Try not to keep the injected leg bent for a long period of time.  Remove the dressing in 24 hours, and you may shower. Clean the area with soap and water, and apply a band aid for the next 5 days.                                                                 No  Lifting over 5-10 lbs., that is, not more than 1 gallon of water, or straining for 72 hours.    No driving, no drinking alcohol, and no signing legal documents for the next 24 hours.    Call your doctor for elevated temperature, shortness of breath, chest pain, or cold discolored foot or leg.    If oozing occurs at the injections site, lie down.  Apply pressure with a clean wash cloth for 20 to 30 minutes and call your doctor.    If severe bleeding occurs, lie down, apply pressure.  Call 911 and request an ambulance to take you to the nearest hospital emergency room.    Continue to take your regular medications as instructed.      Follow the instructions in the handout given to you.     Fall Prevention  Millions of people fall every year and injure themselves. You may have had anesthesia or sedation which may increase your risk of falling. You may have health issues that put you at an increased risk of falling.     Here are ways to reduce your risk of falling.  ·   · Make your home safe by keeping walkways clear of objects you may trip over.  · Use non-slip pads under rugs. Do not use area rugs or small throw rugs.  · Use non-slip mats in bathtubs and showers.  · Install handrails and lights on staircases.  · Do not walk in poorly lit areas.  · Do not stand on chairs or wobbly ladders.  · Use caution when reaching overhead or looking upward. This position can cause a loss of balance.  · Be sure your shoes fit properly, have non-slip bottoms and are in good  condition.   · Wear shoes both inside and out. Avoid going barefoot or wearing slippers.  · Be cautious when going up and down stairs, curbs, and when walking on uneven sidewalks.  · If your balance is poor, consider using a cane or walker.  · If your fall was related to alcohol use, stop or limit alcohol intake.   · If your fall was related to use of sleeping medicines, talk to your doctor about this. You may need to reduce your dosage at bedtime if you awaken during the night to go to the bathroom.    · To reduce the need for nighttime bathroom trips:  ¨ Avoid drinking fluids for several hours before going to bed  ¨ Empty your bladder before going to bed  ¨ Men can keep a urinal at the bedside  · Stay as active as you can. Balance, flexibility, strength, and endurance all come from exercise. They all play a role in preventing falls. Ask your healthcare provider which types of activity are right for you.  · Get your vision checked on a regular basis.  · If you have pets, know where they are before you stand up or walk so you don't trip over them.  · Use night lights.

## 2018-07-03 ENCOUNTER — PATIENT MESSAGE (OUTPATIENT)
Dept: CARDIOLOGY | Facility: CLINIC | Age: 58
End: 2018-07-03

## 2018-07-05 ENCOUNTER — OFFICE VISIT (OUTPATIENT)
Dept: CARDIOLOGY | Facility: CLINIC | Age: 58
End: 2018-07-05
Payer: COMMERCIAL

## 2018-07-05 ENCOUNTER — OFFICE VISIT (OUTPATIENT)
Dept: CARDIOTHORACIC SURGERY | Facility: CLINIC | Age: 58
End: 2018-07-05
Payer: COMMERCIAL

## 2018-07-05 VITALS
BODY MASS INDEX: 25.06 KG/M2 | HEART RATE: 86 BPM | RESPIRATION RATE: 20 BRPM | WEIGHT: 164.81 LBS | SYSTOLIC BLOOD PRESSURE: 120 MMHG | DIASTOLIC BLOOD PRESSURE: 74 MMHG | OXYGEN SATURATION: 96 %

## 2018-07-05 VITALS
BODY MASS INDEX: 26.47 KG/M2 | WEIGHT: 174.63 LBS | HEART RATE: 77 BPM | HEIGHT: 68 IN | SYSTOLIC BLOOD PRESSURE: 108 MMHG | DIASTOLIC BLOOD PRESSURE: 68 MMHG

## 2018-07-05 DIAGNOSIS — I50.22 CHRONIC SYSTOLIC CONGESTIVE HEART FAILURE: ICD-10-CM

## 2018-07-05 DIAGNOSIS — I25.10 CORONARY ARTERY DISEASE, ANGINA PRESENCE UNSPECIFIED, UNSPECIFIED VESSEL OR LESION TYPE, UNSPECIFIED WHETHER NATIVE OR TRANSPLANTED HEART: Primary | ICD-10-CM

## 2018-07-05 DIAGNOSIS — E78.5 DYSLIPIDEMIA: ICD-10-CM

## 2018-07-05 DIAGNOSIS — I25.118 CORONARY ARTERY DISEASE OF NATIVE ARTERY OF NATIVE HEART WITH STABLE ANGINA PECTORIS: Primary | ICD-10-CM

## 2018-07-05 DIAGNOSIS — Z72.0 TOBACCO ABUSE: ICD-10-CM

## 2018-07-05 DIAGNOSIS — I25.10 CORONARY ARTERY DISEASE INVOLVING NATIVE CORONARY ARTERY, ANGINA PRESENCE UNSPECIFIED, UNSPECIFIED WHETHER NATIVE OR TRANSPLANTED HEART: Primary | ICD-10-CM

## 2018-07-05 DIAGNOSIS — I10 ESSENTIAL HYPERTENSION: ICD-10-CM

## 2018-07-05 PROCEDURE — 3008F BODY MASS INDEX DOCD: CPT | Mod: CPTII,S$GLB,, | Performed by: INTERNAL MEDICINE

## 2018-07-05 PROCEDURE — 99205 OFFICE O/P NEW HI 60 MIN: CPT | Mod: S$GLB,,, | Performed by: THORACIC SURGERY (CARDIOTHORACIC VASCULAR SURGERY)

## 2018-07-05 PROCEDURE — 3074F SYST BP LT 130 MM HG: CPT | Mod: CPTII,S$GLB,, | Performed by: INTERNAL MEDICINE

## 2018-07-05 PROCEDURE — 99214 OFFICE O/P EST MOD 30 MIN: CPT | Mod: S$GLB,,, | Performed by: INTERNAL MEDICINE

## 2018-07-05 PROCEDURE — 99999 PR PBB SHADOW E&M-EST. PATIENT-LVL III: CPT | Mod: PBBFAC,,, | Performed by: INTERNAL MEDICINE

## 2018-07-05 PROCEDURE — 3078F DIAST BP <80 MM HG: CPT | Mod: CPTII,S$GLB,, | Performed by: THORACIC SURGERY (CARDIOTHORACIC VASCULAR SURGERY)

## 2018-07-05 PROCEDURE — 3078F DIAST BP <80 MM HG: CPT | Mod: CPTII,S$GLB,, | Performed by: INTERNAL MEDICINE

## 2018-07-05 PROCEDURE — 99999 PR PBB SHADOW E&M-EST. PATIENT-LVL III: CPT | Mod: PBBFAC,,, | Performed by: THORACIC SURGERY (CARDIOTHORACIC VASCULAR SURGERY)

## 2018-07-05 PROCEDURE — 3074F SYST BP LT 130 MM HG: CPT | Mod: CPTII,S$GLB,, | Performed by: THORACIC SURGERY (CARDIOTHORACIC VASCULAR SURGERY)

## 2018-07-05 PROCEDURE — 3008F BODY MASS INDEX DOCD: CPT | Mod: CPTII,S$GLB,, | Performed by: THORACIC SURGERY (CARDIOTHORACIC VASCULAR SURGERY)

## 2018-07-05 NOTE — PROGRESS NOTES
Subjective:    Patient ID:  Anjum Campbell is a 58 y.o. male who presents for follow-up of Post-op Evaluation      HPI  Patient is here for follow-up of coronary artery disease.  Recently underwent left heart catheterization.  He's known to have significantly reduced EF.  He had long lesion extending from the ostium to the mid LAD.  We discussed coronary revascularization algorithms would get CT surgery opinion and then could decide LIMA to LAD versus PCI.  He's had a couple episodes of chest pain which were nitroglycerin responsive since the catheterization.  He's denied any other associated symptoms.  He's express no PND, orthopnea or lower edema.  He denies any dizziness, presyncope or syncope.    Review of Systems   Constitution: Negative.   HENT: Negative.    Eyes: Negative.    Cardiovascular: Positive for chest pain. Negative for dyspnea on exertion, irregular heartbeat, leg swelling, near-syncope, orthopnea, palpitations, paroxysmal nocturnal dyspnea and syncope.   Respiratory: Negative for shortness of breath.    Skin: Negative.    Musculoskeletal: Negative.    Gastrointestinal: Negative for abdominal pain, constipation and diarrhea.   Genitourinary: Negative for dysuria.   Neurological: Negative for dizziness.   Psychiatric/Behavioral: Negative.         Objective:    Physical Exam   Constitutional: He is oriented to person, place, and time. He appears well-developed and well-nourished. No distress.   HENT:   Head: Normocephalic and atraumatic.   Eyes: Conjunctivae and EOM are normal. Pupils are equal, round, and reactive to light.   Neck: Normal range of motion. Neck supple. No thyromegaly present.   Cardiovascular: Normal rate, regular rhythm and normal heart sounds.    No murmur heard.  Pulmonary/Chest: Effort normal and breath sounds normal. No respiratory distress. He has no wheezes. He has no rales. He exhibits no tenderness.   Abdominal: Soft. Bowel sounds are normal.   Musculoskeletal: He exhibits  no edema.   Neurological: He is alert and oriented to person, place, and time.   Skin: Skin is warm and dry.   Psychiatric: He has a normal mood and affect. His behavior is normal.       echo:  6-18  CONCLUSIONS     1 - Moderately-severely depressed left ventricular systolic function (EF 30%).  Global hypokinesis with lateral WMA.     2 - Concentric hypertrophy.     3 - Impaired LV relaxation, normal LAP (grade 1 diastolic dysfunction).     NST:  Impression: ABNORMAL MYOCARDIAL PERFUSION  1. There is a moderate to large size fixed defect of moderate intensity that extends from the base to the apical inferoseptal wall of the left ventricle, consistent with myocardial injury. There is trivial maverick-injury ischemia.   2. There is abnormal wall motion at rest showing moderate to severe global hypokinesis of the left ventricle.   3. There is resting LV dysfunction with a reduced ejection fraction of 28 %.   4. The ventricular volumes are normal at rest and stress.   5. The extracardiac distribution of radioactivity is normal.     C:  B. Summary/Post-Operative Diagnosis      1.   Two vessel coronary artery disease involving mainly proximal LAD and distal small non- dominant left circumflex.   2.   Normal right and left Filling Pressures.   3.   Normal Pulmonary Hypertension.    D. Hemodynamic Results       AIR REST:  PW:  (16)  CO_THERM: 7.06  PA: 29  RV: 29  RA:  (5)  LVEDP: 9       E. Angiographic Results     Diagnostic:          Patient has a right dominant coronary artery.        - Left Main Coronary Artery:             The LM is normal. There is BRITTANY 3 flow.     - Left Anterior Descending Artery:             The proximal LAD has a 80% stenosis. There is BRITTANY 3 flow.     - Left Circumflex Artery:             The distal LCX has a 90% stenosis. There is BRITTANY 3 flow. Small nondominant vessel distally of point of stenosis     - Right Coronary Artery:             The RCA has luminal irregularities. There is BRITTANY 3  flow.     - Common Femoral Artery:             The right CFA is normal.    Assessment:       1. Coronary artery disease involving native coronary artery, angina presence unspecified, unspecified whether native or transplanted heart    2. Chronic systolic congestive heart failure    3. Essential hypertension    4. Dyslipidemia    5. Tobacco abuse         Plan:       -Continue medical therapy  -Has an appointment with CT surgery this afternoon  -Counseled on tobacco cessation    Return to clinic as scheduled

## 2018-07-05 NOTE — PROGRESS NOTES
History & Physical    SUBJECTIVE:     History of Present Illness:  Patient is a 58 y.o. male, referred by Dr. Trevizo, with CAD.  He's known to have significantly reduced EF.  He had long lesion extending from the ostium to the mid LAD.   CT surgery opinion was desired and then could decide LIMA to LAD versus PCI.  He's had a couple episodes of chest pain which were nitroglycerin responsive since the catheterization.  He's denied any other associated symptoms.  He's express no PND, orthopnea or lower edema.  He denies any dizziness, presyncope or syncope.  He denies pedal edema or increased abdominal girth.  Unable to calculate an accurate STS score due to incomplete work up.    Chief Complaint   Patient presents with    Consult       Review of patient's allergies indicates:  No Known Allergies    Current Outpatient Prescriptions   Medication Sig Dispense Refill    aspirin 81 MG Chew Take 81 mg by mouth once daily.      atorvastatin (LIPITOR) 40 MG tablet Take 1 tablet (40 mg total) by mouth once daily. 90 tablet 3    carvedilol (COREG) 12.5 MG tablet Take 1 tablet (12.5 mg total) by mouth 2 (two) times daily with meals. 30 tablet 3    clopidogrel (PLAVIX) 75 mg tablet Take 1 tablet (75 mg total) by mouth once daily. 30 tablet 11    isosorbide mononitrate (IMDUR) 30 MG 24 hr tablet Take 1 tablet (30 mg total) by mouth once daily. 30 tablet 3    lisinopril-hydrochlorothiazide (PRINZIDE,ZESTORETIC) 20-12.5 mg per tablet Take 1 tablet by mouth once daily. 90 tablet 3    nitroGLYCERIN (NITROSTAT) 0.4 MG SL tablet Place 1 tablet (0.4 mg total) under the tongue every 5 (five) minutes as needed for Chest pain. 60 tablet 12    ibuprofen (ADVIL,MOTRIN) 200 MG tablet Take 200 mg by mouth every 6 (six) hours as needed for Pain.       No current facility-administered medications for this visit.        Past Medical History:   Diagnosis Date    Back injury 1994    Chest pain     CHF (congestive heart failure)      "Hypertension      Past Surgical History:   Procedure Laterality Date    back fusion   1995    CATHETERIZATION OF BOTH LEFT AND RIGHT HEART Bilateral 6/28/2018    Procedure: Heart Cath-Bilateral;  Surgeon: Gerardo Trevizo MD;  Location: [facility] CATH LAB;  Service: Cardiology;  Laterality: Bilateral;  RN PREOP 6/27/2018    Lumber surgery  1995     Family History   Problem Relation Age of Onset    Cancer Father      Social History   Substance Use Topics    Smoking status: Current Every Day Smoker     Packs/day: 1.00     Years: 30.00     Types: Cigarettes    Smokeless tobacco: Never Used    Alcohol use Yes      Comment: occaSIONAL        Review of Systems     Constitution: Negative.   HENT: Negative.    Eyes: Negative.    Cardiovascular: Positive for chest pain. Negative for dyspnea on exertion, irregular heartbeat, leg swelling, near-syncope, orthopnea, palpitations, paroxysmal nocturnal dyspnea and syncope.   Respiratory: Negative for shortness of breath.    Skin: Negative.    Musculoskeletal: Negative.    Gastrointestinal: Negative for abdominal pain, constipation and diarrhea.   Genitourinary: Negative for dysuria.   Neurological: Negative for dizziness.   Psychiatric/Behavioral: Negative.      OBJECTIVE:     Vital Signs (Most Recent)  Pulse: 77 (07/05/18 1505)  BP: 108/68 (07/05/18 1505)  5' 8" (1.727 m)  79.2 kg (174 lb 9.7 oz)       Physical Exam     Constitutional: He is oriented to person, place, and time. He appears well-developed and well-nourished. No distress.   HENT:   Head: Normocephalic and atraumatic.   Eyes: Conjunctivae and EOM are normal. Pupils are equal, round, and reactive to light.   Neck: Normal range of motion. Neck supple. No thyromegaly present.   Cardiovascular: Normal rate, regular rhythm and normal heart sounds.    No murmur heard.  Pulmonary/Chest: Effort normal and breath sounds normal. No respiratory distress. He has no wheezes. He has no rales. He exhibits no tenderness. "   Abdominal: Soft. Bowel sounds are normal.   Musculoskeletal: He exhibits no edema.   Neurological: He is alert and oriented to person, place, and time.   Skin: Skin is warm and dry.   Psychiatric: He has a normal mood and affect. His behavior is normal.     Laboratory  Creatinine 1.2      Diagnostic Results:  LHC:  - Left Main Coronary Artery:             The LM is normal. There is BRITTANY 3 flow.     - Left Anterior Descending Artery:             The proximal LAD has a 80% stenosis. There is BRITTANY 3 flow.     - Left Circumflex Artery:             The distal LCX has a 90% stenosis. There is BRITTANY 3 flow. Small nondominant vessel distally of point of stenosis     - Right Coronary Artery:             The RCA has luminal irregularities. There is BRITTANY 3 flow.     - Common Femoral Artery:             The right CFA is normal.    2D echo:   1 - Moderately-severely depressed left ventricular systolic function (EF 30%).  Global hypokinesis with lateral WMA.     2 - Concentric hypertrophy.     3 - Impaired LV relaxation, normal LAP (grade 1 diastolic dysfunction).   LVEDD:  5.1 cm    PET stress:  1. There is a moderate to large size fixed defect of moderate intensity that extends from the base to the apical inferoseptal wall of the left ventricle, consistent with myocardial injury. There is trivial maverick-injury ischemia.   2. There is abnormal wall motion at rest showing moderate to severe global hypokinesis of the left ventricle.   3. There is resting LV dysfunction with a reduced ejection fraction of 28 %.   4. The ventricular volumes are normal at rest and stress.   5. The extracardiac distribution of radioactivity is normal.     ASSESSMENT/PLAN:     58 year old male with CAD and chronic systolic CHF (EF 30%) presents to discuss CABG.      PLAN:    CTS Attending Note:    I have personally taken the history and examined this patient and agree with the NP's note as stated above. 58-year-old gentleman who initially went for a  work physical.  His EKG was abnormal, and in retrospect he reported substernal chest pain. He had a nuclear stress that demonstrated scar, but minimal ischemia.  Follow-up coronary angiography demonstrated a very distal termination circumflex lesion, and an ostial LAD lesion.  I think he almost certainly has myocardial ischemia and resultant angina.  However, in the face of a negative report on the nuclear study, it is difficult to make the case for surgical revascularization.  We will plan to obtain a PET stress to confirm anterior ischemia, and then likely proceed with surgical revascularization.

## 2018-07-05 NOTE — LETTER
July 5, 2018      Gerardo Trevizo MD  [address]  [address]  [facility] [address]           [facility] - Cardiovascular Surg  [address]  [place] [address]  Phone: [phone]          Patient: Anjum Campbell   MR Number: [id number]   YOB: 1960   Date of Visit: 7/5/2018       Dear Dr. Gerardo Trevizo:    Thank you for referring Anjum Campbell to me for evaluation. Attached you will find relevant portions of my assessment and plan of care.    If you have questions, please do not hesitate to call me. I look forward to following Anjum Campbell along with you.    Sincerely,    Orville Payne MD    Enclosure  CC:  No Recipients    If you would like to receive this communication electronically, please contact [email or web address] or [phone] to request more information on [email or web address] Link access.    For providers and/or their staff who would like to refer a patient to [facility], please contact us through our one-stop-shop provider referral line, [facility] , at [phone].    If you feel you have received this communication in error or would no longer like to receive these types of communications, please e-mail [email or web address]

## 2018-07-11 ENCOUNTER — PATIENT MESSAGE (OUTPATIENT)
Dept: CARDIOLOGY | Facility: CLINIC | Age: 58
End: 2018-07-11

## 2018-07-12 ENCOUNTER — CLINICAL SUPPORT (OUTPATIENT)
Dept: CARDIOLOGY | Facility: CLINIC | Age: 58
End: 2018-07-12
Attending: THORACIC SURGERY (CARDIOTHORACIC VASCULAR SURGERY)
Payer: COMMERCIAL

## 2018-07-12 DIAGNOSIS — I25.10 CORONARY ARTERY DISEASE, ANGINA PRESENCE UNSPECIFIED, UNSPECIFIED VESSEL OR LESION TYPE, UNSPECIFIED WHETHER NATIVE OR TRANSPLANTED HEART: ICD-10-CM

## 2018-07-12 LAB — DIASTOLIC DYSFUNCTION: NO

## 2018-07-12 PROCEDURE — 93015 CV STRESS TEST SUPVJ I&R: CPT | Mod: S$GLB,,, | Performed by: INTERNAL MEDICINE

## 2018-07-12 PROCEDURE — 78492 MYOCRD IMG PET MLT RST&STRS: CPT | Mod: S$GLB,,, | Performed by: INTERNAL MEDICINE

## 2018-07-12 PROCEDURE — A9555 RB82 RUBIDIUM: HCPCS | Mod: S$GLB,,, | Performed by: INTERNAL MEDICINE

## 2018-07-16 ENCOUNTER — PATIENT MESSAGE (OUTPATIENT)
Dept: CARDIOTHORACIC SURGERY | Facility: CLINIC | Age: 58
End: 2018-07-16

## 2018-07-19 ENCOUNTER — PATIENT MESSAGE (OUTPATIENT)
Dept: CARDIOTHORACIC SURGERY | Facility: CLINIC | Age: 58
End: 2018-07-19

## 2018-07-23 ENCOUNTER — PATIENT MESSAGE (OUTPATIENT)
Dept: CARDIOLOGY | Facility: CLINIC | Age: 58
End: 2018-07-23

## 2018-07-24 ENCOUNTER — OFFICE VISIT (OUTPATIENT)
Dept: CARDIOTHORACIC SURGERY | Facility: CLINIC | Age: 58
End: 2018-07-24
Payer: COMMERCIAL

## 2018-07-24 ENCOUNTER — PATIENT MESSAGE (OUTPATIENT)
Dept: CARDIOLOGY | Facility: CLINIC | Age: 58
End: 2018-07-24

## 2018-07-24 VITALS
TEMPERATURE: 98 F | HEART RATE: 88 BPM | SYSTOLIC BLOOD PRESSURE: 123 MMHG | DIASTOLIC BLOOD PRESSURE: 77 MMHG | HEIGHT: 68 IN | BODY MASS INDEX: 27.03 KG/M2 | WEIGHT: 178.38 LBS | OXYGEN SATURATION: 98 %

## 2018-07-24 DIAGNOSIS — I25.10 CORONARY ARTERY DISEASE, ANGINA PRESENCE UNSPECIFIED, UNSPECIFIED VESSEL OR LESION TYPE, UNSPECIFIED WHETHER NATIVE OR TRANSPLANTED HEART: Primary | ICD-10-CM

## 2018-07-24 DIAGNOSIS — I25.110 CORONARY ARTERY DISEASE INVOLVING NATIVE CORONARY ARTERY OF NATIVE HEART WITH UNSTABLE ANGINA PECTORIS: Primary | ICD-10-CM

## 2018-07-24 PROCEDURE — 3074F SYST BP LT 130 MM HG: CPT | Mod: CPTII,S$GLB,, | Performed by: THORACIC SURGERY (CARDIOTHORACIC VASCULAR SURGERY)

## 2018-07-24 PROCEDURE — 3078F DIAST BP <80 MM HG: CPT | Mod: CPTII,S$GLB,, | Performed by: THORACIC SURGERY (CARDIOTHORACIC VASCULAR SURGERY)

## 2018-07-24 PROCEDURE — 99215 OFFICE O/P EST HI 40 MIN: CPT | Mod: S$GLB,,, | Performed by: THORACIC SURGERY (CARDIOTHORACIC VASCULAR SURGERY)

## 2018-07-24 PROCEDURE — 3008F BODY MASS INDEX DOCD: CPT | Mod: CPTII,S$GLB,, | Performed by: THORACIC SURGERY (CARDIOTHORACIC VASCULAR SURGERY)

## 2018-07-24 PROCEDURE — 99999 PR PBB SHADOW E&M-EST. PATIENT-LVL III: CPT | Mod: PBBFAC,,, | Performed by: THORACIC SURGERY (CARDIOTHORACIC VASCULAR SURGERY)

## 2018-07-24 NOTE — PROGRESS NOTES
Subjective:       Patient ID: Anjum Campbell is a 58 y.o. male.    Chief Complaint: No chief complaint on file.    Patient is a 58 y.o. male, referred by Dr. Trevizo, with CAD.  He's known to have significantly reduced EF.  He had long lesion extending from the ostium to the mid LAD.   CT surgery opinion was desired and then could decide LIMA to LAD versus PCI.  He's had a multi episodes of chest pain at rest which were nitroglycerin (using 3-4 SL tabs) responsive since the catheterization.  He's denied any other associated symptoms.  He's express no PND, orthopnea or lower edema.  He denies any dizziness, presyncope or syncope.  He denies pedal edema or increased abdominal girth.        Review of Systems   Constitutional: Negative for activity change and fatigue.   Respiratory: Negative for shortness of breath.    Cardiovascular: Positive for chest pain. Negative for palpitations and leg swelling.   Gastrointestinal: Negative for abdominal pain, diarrhea and vomiting.   Endocrine: Negative for polydipsia, polyphagia and polyuria.   Genitourinary: Negative for dysuria.   Musculoskeletal: Negative for gait problem.   Skin: Negative for rash.   Allergic/Immunologic: Negative for immunocompromised state.   Neurological: Negative for dizziness, syncope and weakness.   Hematological: Does not bruise/bleed easily.   Psychiatric/Behavioral: Negative for behavioral problems.       Objective:      Physical Exam   Constitutional: He is oriented to person, place, and time. He appears well-developed and well-nourished.   Cardiovascular: Normal rate, regular rhythm and normal heart sounds.    Pulmonary/Chest: Effort normal and breath sounds normal.   Abdominal: Soft.   Neurological: He is alert and oriented to person, place, and time.   Skin: Skin is warm, dry and intact.   Psychiatric: He has a normal mood and affect.       Assessment:   1. CAD    PET CONCLUSIONS: NO CLINICALLY SIGNIFICANT STRESS INDUCED PERFUSION DEFECTS as  assessed with PET perfusion imaging.  1. There is no evidence of a discrete hemodynamically significant coronary stenosis.   2. Although no clinically significant stress defect is seen, there is resting flow heterogeneity that does not improve after Dipyridamole. These results suggest mild endothelial dysfunction due to elevated cholesterol, high blood pressure and mild,   diffuse, non-obstructive coronary atherosclerosis without clinically significant, localized perfusion defects.   3. Resting wall motion is physiologic. Stress wall motion is physiologic.   4. There is resting LV dysfunction with a reduced ejection fraction of 37 %. There is stress induced LV dysfunction with a reduced ejection fraction of 41 %.  (normal is >= 51%)  5. The ventricular volumes are normal at rest and stress.   6. The extracardiac distribution of radioactivity is normal.   7. There was no previous study available to compare.  Plan:   Pre op for CABG x1    CTS Attending Note:    I have personally taken the history and examined this patient and agree with the NP's note as stated above.  Pleasant 58-year-old gentleman with substernal chest pain.  His pain occurs nearly every day, and is relieved with nitroglycerin.  He had a coronary angiogram that demonstrated proximal LAD lesion as well as a very distal circumflex lesion.  He has had a nuke and a PET scan which did not demonstrate ischemia.  However, his symptoms are classic.  Given his clinical history, and despite the result of those studies, I recommended coronary artery bypass grafting with left internal mammary artery to left anterior descending.  We discussed the risks and benefits of the proposed procedure, including but not limited to death, stroke, respiratory complications, renal complications, arrythmia, and infection. His questions have been answered, and he wishes to proceed.

## 2018-07-24 NOTE — LETTER
July 24, 2018        Gerardo Trevizo MD  [address]  [address]  [facility] [address]             [facility] - Cardiovascular Surg  [address]  [place] [address]  Phone: [phone]   Patient: Anjum Campbell   MR Number: [id number]   YOB: 1960   Date of Visit: 7/24/2018       Dear Dr. Trevizo:    Thank you for referring Anjum Campbell to me for evaluation. Below are the relevant portions of my assessment and plan of care.            If you have questions, please do not hesitate to call me. I look forward to following Anjum along with you.    Sincerely,      Orville Payne MD           CC  No Recipients

## 2018-07-25 DIAGNOSIS — I25.10 CORONARY ARTERY DISEASE, ANGINA PRESENCE UNSPECIFIED, UNSPECIFIED VESSEL OR LESION TYPE, UNSPECIFIED WHETHER NATIVE OR TRANSPLANTED HEART: Primary | ICD-10-CM

## 2018-07-26 RX ORDER — CLOPIDOGREL BISULFATE 75 MG/1
75 TABLET ORAL DAILY
Qty: 30 TABLET | Refills: 11 | Status: SHIPPED | OUTPATIENT
Start: 2018-07-26 | End: 2018-08-06 | Stop reason: SDUPTHER

## 2018-08-06 ENCOUNTER — OFFICE VISIT (OUTPATIENT)
Dept: CARDIOLOGY | Facility: CLINIC | Age: 58
End: 2018-08-06
Payer: COMMERCIAL

## 2018-08-06 VITALS
WEIGHT: 176.38 LBS | OXYGEN SATURATION: 100 % | DIASTOLIC BLOOD PRESSURE: 68 MMHG | BODY MASS INDEX: 26.82 KG/M2 | RESPIRATION RATE: 20 BRPM | SYSTOLIC BLOOD PRESSURE: 100 MMHG | HEART RATE: 68 BPM

## 2018-08-06 DIAGNOSIS — Z72.0 TOBACCO ABUSE: ICD-10-CM

## 2018-08-06 DIAGNOSIS — I25.10 CORONARY ARTERY DISEASE, ANGINA PRESENCE UNSPECIFIED, UNSPECIFIED VESSEL OR LESION TYPE, UNSPECIFIED WHETHER NATIVE OR TRANSPLANTED HEART: Primary | ICD-10-CM

## 2018-08-06 DIAGNOSIS — E78.5 DYSLIPIDEMIA: ICD-10-CM

## 2018-08-06 DIAGNOSIS — I50.22 CHRONIC SYSTOLIC CONGESTIVE HEART FAILURE: ICD-10-CM

## 2018-08-06 DIAGNOSIS — I10 ESSENTIAL HYPERTENSION: ICD-10-CM

## 2018-08-06 PROCEDURE — 3078F DIAST BP <80 MM HG: CPT | Mod: CPTII,S$GLB,, | Performed by: INTERNAL MEDICINE

## 2018-08-06 PROCEDURE — 3074F SYST BP LT 130 MM HG: CPT | Mod: CPTII,S$GLB,, | Performed by: INTERNAL MEDICINE

## 2018-08-06 PROCEDURE — 99214 OFFICE O/P EST MOD 30 MIN: CPT | Mod: S$GLB,,, | Performed by: INTERNAL MEDICINE

## 2018-08-06 PROCEDURE — 3008F BODY MASS INDEX DOCD: CPT | Mod: CPTII,S$GLB,, | Performed by: INTERNAL MEDICINE

## 2018-08-06 PROCEDURE — 99999 PR PBB SHADOW E&M-EST. PATIENT-LVL III: CPT | Mod: PBBFAC,,, | Performed by: INTERNAL MEDICINE

## 2018-08-06 RX ORDER — CLOPIDOGREL BISULFATE 75 MG/1
75 TABLET ORAL DAILY
Qty: 90 TABLET | Refills: 2 | Status: ON HOLD | OUTPATIENT
Start: 2018-08-06 | End: 2018-08-18 | Stop reason: HOSPADM

## 2018-08-06 NOTE — PROGRESS NOTES
Subjective:    Patient ID:  Anjum Campbell is a 58 y.o. male who presents for follow-up of Follow-up      HPI   Previous history:  Patient is here for follow-up of coronary artery disease.  Recently underwent left heart catheterization.  He's known to have significantly reduced EF.  He had long lesion extending from the ostium to the mid LAD.  We discussed coronary revascularization algorithms would get CT surgery opinion and then could decide LIMA to LAD versus PCI.  He's had a couple episodes of chest pain which were nitroglycerin responsive since the catheterization.  He's denied any other associated symptoms.  He's express no PND, orthopnea or lower edema.  He denies any dizziness, presyncope or syncope.    Today:  Here follow-up coronary artery disease.  He still is having chest pains but has not been adherent to sedentary lifestyle.  He did have a PET scan which was unrevealing but he still is scheduled due to classic crescendo angina and previously reviewed angiogram showing proximal LAD disease.  He is mainly upset today that they delayed his surgery.  He is really worried and again we reassured him that this needs to be taken care of.  He we address his concerns.  He set up for surgery next week.  He denies any PND, orthopnea or lower Barbie.  He has not taken any dizziness, presyncope or syncope.  We discussed minimizing activity until the surgery.  He mainly is again worried about not working and lack of income at this point as well.  We did file his short-term disability papers.      Review of Systems   Constitution: Negative.   HENT: Negative.    Eyes: Negative.    Cardiovascular: Positive for chest pain. Negative for dyspnea on exertion, irregular heartbeat, leg swelling, near-syncope, orthopnea, palpitations, paroxysmal nocturnal dyspnea and syncope.   Respiratory: Negative for shortness of breath.    Skin: Negative.    Musculoskeletal: Negative.    Gastrointestinal: Negative for abdominal pain,  constipation and diarrhea.   Genitourinary: Negative for dysuria.   Neurological: Negative for dizziness.   Psychiatric/Behavioral: Negative.         Objective:    Physical Exam   Constitutional: He is oriented to person, place, and time. He appears well-developed and well-nourished. No distress.   HENT:   Head: Normocephalic and atraumatic.   Eyes: Conjunctivae and EOM are normal. Pupils are equal, round, and reactive to light.   Neck: Normal range of motion. Neck supple. No thyromegaly present.   Cardiovascular: Normal rate, regular rhythm and normal heart sounds.    No murmur heard.  Pulmonary/Chest: Effort normal and breath sounds normal. No respiratory distress. He has no wheezes. He has no rales. He exhibits no tenderness.   Abdominal: Soft. Bowel sounds are normal.   Musculoskeletal: He exhibits no edema.   Neurological: He is alert and oriented to person, place, and time.   Skin: Skin is warm and dry.   Psychiatric: He has a normal mood and affect. His behavior is normal.       echo:  6-18  CONCLUSIONS     1 - Moderately-severely depressed left ventricular systolic function (EF 30%).  Global hypokinesis with lateral WMA.     2 - Concentric hypertrophy.     3 - Impaired LV relaxation, normal LAP (grade 1 diastolic dysfunction).     NST:  Impression: ABNORMAL MYOCARDIAL PERFUSION  1. There is a moderate to large size fixed defect of moderate intensity that extends from the base to the apical inferoseptal wall of the left ventricle, consistent with myocardial injury. There is trivial maverick-injury ischemia.   2. There is abnormal wall motion at rest showing moderate to severe global hypokinesis of the left ventricle.   3. There is resting LV dysfunction with a reduced ejection fraction of 28 %.   4. The ventricular volumes are normal at rest and stress.   5. The extracardiac distribution of radioactivity is normal.     C:  B. Summary/Post-Operative Diagnosis      1.   Two vessel coronary artery disease  involving mainly proximal LAD and distal small non- dominant left circumflex.   2.   Normal right and left Filling Pressures.   3.   Normal Pulmonary Hypertension.    D. Hemodynamic Results       AIR REST:  PW:  (16)  CO_THERM: 7.06  PA: 29  RV: 29  RA:  (5)  LVEDP: 9       E. Angiographic Results     Diagnostic:          Patient has a right dominant coronary artery.        - Left Main Coronary Artery:             The LM is normal. There is BRITTANY 3 flow.     - Left Anterior Descending Artery:             The proximal LAD has a 80% stenosis. There is BRITTANY 3 flow.     - Left Circumflex Artery:             The distal LCX has a 90% stenosis. There is BRITTANY 3 flow. Small nondominant vessel distally of point of stenosis     - Right Coronary Artery:             The RCA has luminal irregularities. There is BRITTANY 3 flow.     - Common Femoral Artery:             The right CFA is normal.    Assessment:       1. Coronary artery disease, angina presence unspecified, unspecified vessel or lesion type, unspecified whether native or transplanted heart    2. Chronic systolic congestive heart failure    3. Essential hypertension    4. Dyslipidemia    5. Tobacco abuse         Plan:       -Continue medical therapy  -Has an appointment with CT surgery for LIMA to LAD  -Counseled on tobacco cessation    Return to clinic after the surgery

## 2018-08-09 ENCOUNTER — HOSPITAL ENCOUNTER (OUTPATIENT)
Dept: PREADMISSION TESTING | Facility: HOSPITAL | Age: 58
Discharge: HOME | End: 2018-08-09
Attending: ANESTHESIOLOGY
Payer: COMMERCIAL

## 2018-08-09 ENCOUNTER — DOCUMENTATION ONLY (OUTPATIENT)
Dept: CARDIOTHORACIC SURGERY | Facility: CLINIC | Age: 58
End: 2018-08-09

## 2018-08-09 ENCOUNTER — HOSPITAL ENCOUNTER (OUTPATIENT)
Dept: RADIOLOGY | Facility: HOSPITAL | Age: 58
Discharge: HOME | End: 2018-08-09
Attending: THORACIC SURGERY (CARDIOTHORACIC VASCULAR SURGERY)
Payer: COMMERCIAL

## 2018-08-09 ENCOUNTER — HOSPITAL ENCOUNTER (OUTPATIENT)
Dept: PULMONOLOGY | Facility: CLINIC | Age: 58
Discharge: HOME | End: 2018-08-09
Payer: COMMERCIAL

## 2018-08-09 ENCOUNTER — ANESTHESIA EVENT (OUTPATIENT)
Dept: SURGERY | Facility: HOSPITAL | Age: 58
DRG: 236 | End: 2018-08-09
Payer: COMMERCIAL

## 2018-08-09 ENCOUNTER — HOSPITAL ENCOUNTER (OUTPATIENT)
Dept: VASCULAR SURGERY | Facility: CLINIC | Age: 58
Discharge: HOME | End: 2018-08-09
Attending: THORACIC SURGERY (CARDIOTHORACIC VASCULAR SURGERY)
Payer: COMMERCIAL

## 2018-08-09 ENCOUNTER — OFFICE VISIT (OUTPATIENT)
Dept: CARDIOTHORACIC SURGERY | Facility: CLINIC | Age: 58
End: 2018-08-09
Payer: COMMERCIAL

## 2018-08-09 VITALS
DIASTOLIC BLOOD PRESSURE: 72 MMHG | HEART RATE: 77 BPM | WEIGHT: 177.38 LBS | SYSTOLIC BLOOD PRESSURE: 111 MMHG | HEIGHT: 68 IN | OXYGEN SATURATION: 99 % | TEMPERATURE: 98 F | BODY MASS INDEX: 26.88 KG/M2

## 2018-08-09 VITALS
HEIGHT: 68 IN | SYSTOLIC BLOOD PRESSURE: 108 MMHG | WEIGHT: 177 LBS | BODY MASS INDEX: 26.83 KG/M2 | RESPIRATION RATE: 18 BRPM | OXYGEN SATURATION: 97 % | HEART RATE: 80 BPM | DIASTOLIC BLOOD PRESSURE: 64 MMHG | TEMPERATURE: 98 F

## 2018-08-09 DIAGNOSIS — Z01.818 PREOP EXAMINATION: ICD-10-CM

## 2018-08-09 DIAGNOSIS — I25.10 CAD (CORONARY ARTERY DISEASE): ICD-10-CM

## 2018-08-09 DIAGNOSIS — I25.10 CORONARY ARTERY DISEASE, ANGINA PRESENCE UNSPECIFIED, UNSPECIFIED VESSEL OR LESION TYPE, UNSPECIFIED WHETHER NATIVE OR TRANSPLANTED HEART: ICD-10-CM

## 2018-08-09 LAB
PRE FEV1 FVC: 56
PRE FEV1: 1.71
PRE FVC: 3.08
PREDICTED FEV1 FVC: 81
PREDICTED FEV1: 3.35
PREDICTED FVC: 4.12

## 2018-08-09 PROCEDURE — 71046 X-RAY EXAM CHEST 2 VIEWS: CPT | Mod: 26,,, | Performed by: RADIOLOGY

## 2018-08-09 PROCEDURE — 71046 X-RAY EXAM CHEST 2 VIEWS: CPT | Mod: TC,FY

## 2018-08-09 PROCEDURE — 99499 UNLISTED E&M SERVICE: CPT | Mod: S$GLB,,, | Performed by: THORACIC SURGERY (CARDIOTHORACIC VASCULAR SURGERY)

## 2018-08-09 PROCEDURE — 99999 PR PBB SHADOW E&M-EST. PATIENT-LVL III: CPT | Mod: PBBFAC,,, | Performed by: THORACIC SURGERY (CARDIOTHORACIC VASCULAR SURGERY)

## 2018-08-09 PROCEDURE — 93880 EXTRACRANIAL BILAT STUDY: CPT | Mod: S$GLB,,, | Performed by: SURGERY

## 2018-08-09 PROCEDURE — 94010 BREATHING CAPACITY TEST: CPT | Mod: S$GLB,,, | Performed by: INTERNAL MEDICINE

## 2018-08-09 RX ORDER — IPRATROPIUM BROMIDE AND ALBUTEROL SULFATE 2.5; .5 MG/3ML; MG/3ML
3 SOLUTION RESPIRATORY (INHALATION) EVERY 4 HOURS PRN
Status: CANCELLED | OUTPATIENT
Start: 2018-08-09 | End: 2018-08-10

## 2018-08-09 RX ORDER — PANTOPRAZOLE SODIUM 40 MG/1
40 TABLET, DELAYED RELEASE ORAL
Status: CANCELLED | OUTPATIENT
Start: 2018-08-10

## 2018-08-09 RX ORDER — MUPIROCIN 20 MG/G
1 OINTMENT TOPICAL 2 TIMES DAILY
Status: CANCELLED | OUTPATIENT
Start: 2018-08-09 | End: 2018-08-14

## 2018-08-09 RX ORDER — FENTANYL CITRATE 50 UG/ML
50 INJECTION, SOLUTION INTRAMUSCULAR; INTRAVENOUS
Status: CANCELLED | OUTPATIENT
Start: 2018-08-17

## 2018-08-09 RX ORDER — SODIUM CHLORIDE 9 MG/ML
INJECTION, SOLUTION INTRAVENOUS CONTINUOUS
Status: CANCELLED | OUTPATIENT
Start: 2018-08-09

## 2018-08-09 RX ORDER — ATORVASTATIN CALCIUM 10 MG/1
40 TABLET, FILM COATED ORAL NIGHTLY
Status: CANCELLED | OUTPATIENT
Start: 2018-08-09

## 2018-08-09 RX ORDER — MUPIROCIN 20 MG/G
1 OINTMENT TOPICAL
Status: CANCELLED | OUTPATIENT
Start: 2018-08-09

## 2018-08-09 RX ORDER — PROPOFOL 10 MG/ML
5 INJECTION, EMULSION INTRAVENOUS CONTINUOUS
Status: CANCELLED | OUTPATIENT
Start: 2018-08-09

## 2018-08-09 RX ORDER — OXYCODONE HYDROCHLORIDE 5 MG/1
5 TABLET ORAL EVERY 4 HOURS PRN
Status: CANCELLED | OUTPATIENT
Start: 2018-08-09

## 2018-08-09 RX ORDER — POTASSIUM CHLORIDE 29.8 MG/ML
40 INJECTION INTRAVENOUS
Status: CANCELLED | OUTPATIENT
Start: 2018-08-09

## 2018-08-09 RX ORDER — LIDOCAINE HYDROCHLORIDE 10 MG/ML
1 INJECTION, SOLUTION EPIDURAL; INFILTRATION; INTRACAUDAL; PERINEURAL
Status: CANCELLED | OUTPATIENT
Start: 2018-08-09

## 2018-08-09 RX ORDER — ACETAMINOPHEN 325 MG/1
650 TABLET ORAL EVERY 4 HOURS PRN
Status: CANCELLED | OUTPATIENT
Start: 2018-08-09

## 2018-08-09 RX ORDER — METOCLOPRAMIDE HYDROCHLORIDE 5 MG/ML
5 INJECTION INTRAMUSCULAR; INTRAVENOUS EVERY 6 HOURS PRN
Status: CANCELLED | OUTPATIENT
Start: 2018-08-09

## 2018-08-09 RX ORDER — POTASSIUM CHLORIDE 14.9 MG/ML
20 INJECTION INTRAVENOUS
Status: CANCELLED | OUTPATIENT
Start: 2018-08-09

## 2018-08-09 RX ORDER — DOCUSATE SODIUM 100 MG/1
100 CAPSULE, LIQUID FILLED ORAL 2 TIMES DAILY
Status: CANCELLED | OUTPATIENT
Start: 2018-08-09

## 2018-08-09 RX ORDER — OXYCODONE HYDROCHLORIDE 5 MG/1
10 TABLET ORAL EVERY 4 HOURS PRN
Status: CANCELLED | OUTPATIENT
Start: 2018-08-09

## 2018-08-09 RX ORDER — ASPIRIN 325 MG
325 TABLET ORAL ONCE
Status: CANCELLED | OUTPATIENT
Start: 2018-08-09 | End: 2018-08-09

## 2018-08-09 RX ORDER — METOPROLOL TARTRATE 25 MG/1
25 TABLET ORAL
Status: CANCELLED | OUTPATIENT
Start: 2018-08-09

## 2018-08-09 RX ORDER — DEXTROSE MONOHYDRATE, SODIUM CHLORIDE, AND POTASSIUM CHLORIDE 50; 1.49; 4.5 G/1000ML; G/1000ML; G/1000ML
INJECTION, SOLUTION INTRAVENOUS CONTINUOUS
Status: CANCELLED | OUTPATIENT
Start: 2018-08-09

## 2018-08-09 RX ORDER — FENTANYL CITRATE 50 UG/ML
25 INJECTION, SOLUTION INTRAMUSCULAR; INTRAVENOUS
Status: CANCELLED | OUTPATIENT
Start: 2018-08-09 | End: 2018-08-16

## 2018-08-09 RX ORDER — POTASSIUM CHLORIDE 14.9 MG/ML
60 INJECTION INTRAVENOUS
Status: CANCELLED | OUTPATIENT
Start: 2018-08-09

## 2018-08-09 RX ORDER — ALBUMIN HUMAN 50 G/1000ML
500 SOLUTION INTRAVENOUS ONCE AS NEEDED
Status: CANCELLED | OUTPATIENT
Start: 2018-08-09 | End: 2018-08-09

## 2018-08-09 RX ORDER — POLYETHYLENE GLYCOL 3350 17 G/17G
17 POWDER, FOR SOLUTION ORAL DAILY
Status: CANCELLED | OUTPATIENT
Start: 2018-08-10

## 2018-08-09 RX ORDER — FENTANYL CITRATE 50 UG/ML
25 INJECTION, SOLUTION INTRAMUSCULAR; INTRAVENOUS
Status: CANCELLED | OUTPATIENT
Start: 2018-08-09

## 2018-08-09 RX ORDER — POTASSIUM CHLORIDE 750 MG/1
20 TABLET, EXTENDED RELEASE ORAL EVERY 12 HOURS
Status: CANCELLED | OUTPATIENT
Start: 2018-08-09

## 2018-08-09 RX ORDER — ASPIRIN 300 MG/1
300 SUPPOSITORY RECTAL ONCE AS NEEDED
Status: CANCELLED | OUTPATIENT
Start: 2018-08-09 | End: 2018-08-09

## 2018-08-09 RX ORDER — PANTOPRAZOLE SODIUM 40 MG/10ML
40 INJECTION, POWDER, LYOPHILIZED, FOR SOLUTION INTRAVENOUS DAILY
Status: CANCELLED | OUTPATIENT
Start: 2018-08-10

## 2018-08-09 RX ORDER — ONDANSETRON 2 MG/ML
4 INJECTION INTRAMUSCULAR; INTRAVENOUS EVERY 12 HOURS PRN
Status: CANCELLED | OUTPATIENT
Start: 2018-08-09

## 2018-08-09 RX ORDER — BISACODYL 10 MG
10 SUPPOSITORY, RECTAL RECTAL EVERY 12 HOURS PRN
Status: CANCELLED | OUTPATIENT
Start: 2018-08-09

## 2018-08-09 RX ORDER — ASPIRIN 325 MG
325 TABLET ORAL DAILY
Status: CANCELLED | OUTPATIENT
Start: 2018-08-09

## 2018-08-09 RX ORDER — ASPIRIN 325 MG
325 TABLET, DELAYED RELEASE (ENTERIC COATED) ORAL DAILY
Status: CANCELLED | OUTPATIENT
Start: 2018-08-10

## 2018-08-09 RX ORDER — IPRATROPIUM BROMIDE AND ALBUTEROL SULFATE 2.5; .5 MG/3ML; MG/3ML
3 SOLUTION RESPIRATORY (INHALATION) EVERY 4 HOURS
Status: CANCELLED | OUTPATIENT
Start: 2018-08-09 | End: 2018-08-10

## 2018-08-09 RX ORDER — SODIUM CHLORIDE 0.9 % (FLUSH) 0.9 %
3 SYRINGE (ML) INJECTION EVERY 8 HOURS
Status: CANCELLED | OUTPATIENT
Start: 2018-08-09

## 2018-08-09 NOTE — H&P (VIEW-ONLY)
Subjective:  Patient is a 58 y.o. male, referred by Dr. Trevizo, with CAD.  He's known to have significantly reduced EF.  He had long lesion extending from the ostium to the mid LAD.   CT surgery opinion was desired and then could decide LIMA to LAD versus PCI.  He's had a multi episodes of chest pain at rest which were nitroglycerin  responsive since the catheterization, up to 7 daily.  He denies any other symptoms.     Review of Systems   Constitutional: Negative for activity change and fatigue.   Respiratory: Negative for shortness of breath.    Cardiovascular: Positive for chest pain. Negative for palpitations and leg swelling.   Gastrointestinal: Negative for abdominal pain, diarrhea and vomiting.   Endocrine: Negative for polydipsia, polyphagia and polyuria.   Genitourinary: Negative for dysuria.   Musculoskeletal: Negative for gait problem.   Skin: Negative for rash.   Allergic/Immunologic: Negative for immunocompromised state.   Neurological: Negative for dizziness, syncope and weakness.   Hematological: Does not bruise/bleed easily.   Psychiatric/Behavioral: Negative for behavioral problems.     Objective  Physical Exam   Constitutional: He is oriented to person, place, and time. He appears well-developed and well-nourished.   Cardiovascular: Normal rate, regular rhythm and normal heart sounds.    Pulmonary/Chest: Effort normal and breath sounds normal.   Abdominal: Soft.   Neurological: He is alert and oriented to person, place, and time.   Skin: Skin is warm, dry and intact.   Psychiatric: He has a normal mood and affect.     1. CAD     PET CONCLUSIONS: NO CLINICALLY SIGNIFICANT STRESS INDUCED PERFUSION DEFECTS as assessed with PET perfusion imaging.  1. There is no evidence of a discrete hemodynamically significant coronary stenosis.   2. Although no clinically significant stress defect is seen, there is resting flow heterogeneity that does not improve after Dipyridamole. These results suggest mild  endothelial dysfunction due to elevated cholesterol, high blood pressure and mild,   diffuse, non-obstructive coronary atherosclerosis without clinically significant, localized perfusion defects.   3. Resting wall motion is physiologic. Stress wall motion is physiologic.   4. There is resting LV dysfunction with a reduced ejection fraction of 37 %. There is stress induced LV dysfunction with a reduced ejection fraction of 41 %.  (normal is >= 51%)  5. The ventricular volumes are normal at rest and stress.   6. The extracardiac distribution of radioactivity is normal.   7. There was no previous study available to compare.    Assessment and plan:  57 yo M with CAD coming in for preop visit for coronary bypass.  Procedure explained to patient, appropriate consents were obtained.  -Proceed to OR on 8/13/2018 for coronary bypass    CTS Attending Note:    I have personally taken the history and examined this patient and agree with the resident's note as stated above. Plan reviewed. Questions answered. He wishes to proceed.

## 2018-08-09 NOTE — ANESTHESIA PREPROCEDURE EVALUATION
08/09/2018  Pre-operative evaluation for Procedure(s) (LRB):  CREATION, BYPASS, ARTERIAL, AORTA TO CORONARY, USING GRAFT (N/A)    Anjum Campbell is a 58 y.o. male w/ PMH DDD of cervical thoracic and lumbar regions, CAD, HTN, tobacco abuse who presents for above.     LDA: 16g PIV L FA    Prev airway: None    Drips: None    Patient Active Problem List   Diagnosis    Lumbar spondylosis    Lumbar radiculopathy    Herniated thoracic disc without myelopathy    Lumbar herniated disc    Status post lumbar spinal fusion    Bilateral shoulder bursitis    Bilateral hip bursitis    DDD (degenerative disc disease), cervical    Cervical spondylosis    DDD (degenerative disc disease), thoracic    DDD (degenerative disc disease), lumbar    Radiculitis    Acute left lumbar radiculopathy    Tobacco abuse    Atypical chest pain    Hyperlipidemia    Benign essential HTN    Chest pain    CAD (coronary artery disease)    Preop examination       Review of patient's allergies indicates:  No Known Allergies     No current facility-administered medications on file prior to encounter.      Current Outpatient Medications on File Prior to Encounter   Medication Sig Dispense Refill    aspirin 81 MG Chew Take 81 mg by mouth once daily.      atorvastatin (LIPITOR) 40 MG tablet Take 1 tablet (40 mg total) by mouth once daily. 90 tablet 3    carvedilol (COREG) 12.5 MG tablet Take 1 tablet (12.5 mg total) by mouth 2 (two) times daily with meals. 30 tablet 3    ibuprofen (ADVIL,MOTRIN) 200 MG tablet Take 200 mg by mouth every 6 (six) hours as needed for Pain.      isosorbide mononitrate (IMDUR) 30 MG 24 hr tablet Take 1 tablet (30 mg total) by mouth once daily. 30 tablet 3    lisinopril-hydrochlorothiazide (PRINZIDE,ZESTORETIC) 20-12.5 mg per tablet Take 1 tablet by mouth once daily. 90 tablet 3    nitroGLYCERIN  (NITROSTAT) 0.4 MG SL tablet Place 1 tablet (0.4 mg total) under the tongue every 5 (five) minutes as needed for Chest pain. 60 tablet 12       Past Surgical History:   Procedure Laterality Date    back fusion   1995    Lumber surgery  1995       Social History     Socioeconomic History    Marital status:      Spouse name: Not on file    Number of children: Not on file    Years of education: Not on file    Highest education level: Not on file   Social Needs    Financial resource strain: Not on file    Food insecurity - worry: Not on file    Food insecurity - inability: Not on file    Transportation needs - medical: Not on file    Transportation needs - non-medical: Not on file   Occupational History    Not on file   Tobacco Use    Smoking status: Current Every Day Smoker     Packs/day: 1.00     Years: 30.00     Pack years: 30.00     Types: Cigarettes    Smokeless tobacco: Never Used   Substance and Sexual Activity    Alcohol use: Yes     Comment: occaSIONAL    Drug use: No    Sexual activity: Not on file   Other Topics Concern    Not on file   Social History Narrative    Not on file         Vital Signs Range (Last 24H):  Temp:  [36.8 °C (98.3 °F)]   Pulse:  [64-73]   Resp:  [10-22]   BP: ()/(51-63)   SpO2:  [96 %-100 %]       CBC: No results for input(s): WBC, RBC, HGB, HCT, PLT, MCV, MCH, MCHC in the last 72 hours.    CMP: No results for input(s): NA, K, CL, CO2, BUN, CREATININE, GLU, MG, PHOS, CALCIUM, ALBUMIN, PROT, ALKPHOS, ALT, AST, BILITOT in the last 72 hours.    INR  No results for input(s): PT, INR, PROTIME, APTT in the last 72 hours.        Diagnostic Studies:    2D Echo: CONCLUSIONS     1 - Moderately-severely depressed left ventricular systolic function (EF 30%).  Global hypokinesis with lateral WMA.     2 - Concentric hypertrophy.     3 - Impaired LV relaxation, normal LAP (grade 1 diastolic dysfunction).           Anesthesia Evaluation    I have reviewed the Patient  Summary Reports.    I have reviewed the Nursing Notes.   I have reviewed the Medications.     Review of Systems  Anesthesia Hx:  No problems with previous Anesthesia  History of prior surgery of interest to airway management or planning: Previous anesthesia: General 1995: Lumbar Fusion with general anesthesia.  Denies Family Hx of Anesthesia complications.   Denies Personal Hx of Anesthesia complications.   Social:  Smoker  Patient's occupation is . Tobacco Use: Active smoker of cigarette for 40 years, < 1/2 a pack per day Alcohol Use: Pt consumes occasional,    EENT/Dental:  EENT/Dental Normal Denies Throat Symptoms  Denies Jaw Problems   Cardiovascular:   Hypertension CAD   Grade 1 diastolic dysfunction Functional Capacity hard labor: cuts trees/digging/chopping: denies CP/SOB  Coronary Artery Disease:  Coronary Angiography (LHC).  Congestive Heart Failure (CHF) , Chronic Congestive Heart Failure  Denies Deep Venous Thrombosis (DVT)  Hypertension    Pulmonary:  Pulmonary Normal  Denies Asthma.  Denies Chronic Obstructive Pulmonary Disease (COPD).    Renal/:  Renal/ Normal     Hepatic/GI:  Hepatic/GI Normal  Denies Esophageal / Stomach Disorders  Denies Liver Disease    Musculoskeletal:   Arthritis   Spine Disorders: cervical, thoracic and lumbar Disc disease and Degenerative disease    Neurological:  Denies Seizure Disorder  Denies CVA - Cerebrovasular Accident  Denies TIA - Transient Ischemic Attack    Endocrine:  Endocrine Normal  Denies Diabetes  Denies Thyroid Disease  Metabolic Disorders, Hyperlipoproteinemia      Physical Exam  General:  Well nourished    Airway/Jaw/Neck:  Airway Findings: Mouth Opening: Normal Mallampati: II  Improves to II with phonation.  TM Distance: Normal, at least 6 cm  Jaw/Neck Findings:  Neck ROM: Normal ROM      Dental:  Dental Findings: Upper Dentures   Chest/Lungs:  Chest/Lungs Findings: Clear to auscultation, Normal Respiratory Rate     Heart/Vascular:  Heart  Findings: Rate: Normal  Rhythm: Regular Rhythm  Vascular Findings: Normal       Mental Status:  Mental Status Findings:  Cooperative, Alert and Oriented         Anesthesia Plan  Type of Anesthesia, risks & benefits discussed:  Anesthesia Type:  general  Patient's Preference:   Intra-op Monitoring Plan: arterial line and central line  Intra-op Monitoring Plan Comments:   Post Op Pain Control Plan:   Post Op Pain Control Plan Comments:   Induction:   IV  Beta Blocker:  Patient is on a Beta-Blocker and has received one dose within the past 24 hours (No further documentation required).       Informed Consent: Patient understands risks and agrees with Anesthesia plan.  Questions answered. Anesthesia consent signed with patient.  ASA Score: 4     Day of Surgery Review of History & Physical:    H&P update referred to the surgeon.         Ready For Surgery From Anesthesia Perspective.     Barrie Garner RN

## 2018-08-09 NOTE — PROGRESS NOTES
"PREPARING FOR SURGERY  Your surgery has been scheduled for:   Day: _Monday___________ Date:  _Aug 13th____  Arrival Time: 11:20AM  Start Time: 1:20PM  You should report to the second floor surgery center, located on the [facility] side of the second floor of the [facility]. The phone number is [phone].     PLEASE NOTE  · If you are allergic to any medications, please inform your doctor or the nurse responsible for your care.  · Tell the doctor if you take aspirin, products containing aspirin, herbal medications or blood thinners, such as Coumadin, Pradaxa, or Plavix.  · Notify your doctor if you are diabetic and provide information about the medications you take.  · Arrange for someone to drive you home following surgery. You will not be allowed to leave the surgical facility alone or drive yourself home following sedation and anesthesia.  · If you have not already done so, please bring a list of your medications with you the day of your surgery.     BEFORE SURGERY  Stop taking all herbal medications 14 days prior to surgery  Stop taking aspirin, products containing aspirin 0 days before surgery  Stop taking blood thinners 5 days before surgery  Refrain from drinking alcohol beverages for 24 hours before and after surgery  Stop or limit smoking 0 days before surgery  Other: ________________________________________________________     THE NIGHT BEFORE SURGERY  Eat a light supper on the night before your surgery, no greasy or fatty foods.  DO NOT EAT OR DRINK ANYTHING AFTER MIDNIGHT, INCLUDING GUM, HARD CANDY, MINTS, OR CHEWING TOBACCO  Take a complete shower. Wash your body from the neck down with Hibiclens (chlorhexidine gluconate) soap. Hibiclens soap may be purchased over the counter at the pharmacy. Keep the soap away from your eyes, ears, and mouth. After washing with Hibiclens, rinse thoroughly. You may also use any soap labeled "antibacterial". Shampoo your hair with your regular shampoo.   "   THE DAY OF SURGERY  Take another shower with Hibiclens or any antibacterial soap, to reduce the change of infection.  Medications to take the morning of surgery:  Carvedilol (Coreg)______ with a small sip of water. Do not take diuretics or fluid pills.  Diabetic medication instructions will be given by the preop center. They will call you before your surgery.  You may brush your teeth and rinse your mouth, but do not shallow any water.  Do not apply perfume, powder, body lotions or deodorant on the day of surgery.  Do not wear any makeup, including mascara and false eyelashes.  Nail polish should be removed.  Wear comfortable clothes, such as button front shirt and loose-fitting pants.  Leave all jewelry, including body piercings and valuables at home.  Hairpins and clasps must be removed before you enter the operating room.  You may wear glasses, dentures and hearing aids before and after surgery. They may need to be removed before going into the operating room. Contact lenses worn before surgery must be removed before entering the operating room. Please bring a case for your hearing aids, glasses and/or contacts.  Bring any devices you will need after surgery such as crutches or canes.  If you have sleep apnea, please bring your CPAP machine.  If you have an implantable device, such as a pacemaker or AICD, please bring the device information card, if you have one.     If you have any questions or concerns, please don't hesitate to call.     Lois Waters RN  RN Clinician  Thoracic and Cardiovascular Surgery  [address] [place] [address] [id number] [phone] fax

## 2018-08-09 NOTE — PROGRESS NOTES
Subjective:  Patient is a 58 y.o. male, referred by Dr. Trevizo, with CAD.  He's known to have significantly reduced EF.  He had long lesion extending from the ostium to the mid LAD.   CT surgery opinion was desired and then could decide LIMA to LAD versus PCI.  He's had a multi episodes of chest pain at rest which were nitroglycerin  responsive since the catheterization, up to 7 daily.  He denies any other symptoms.     Review of Systems   Constitutional: Negative for activity change and fatigue.   Respiratory: Negative for shortness of breath.    Cardiovascular: Positive for chest pain. Negative for palpitations and leg swelling.   Gastrointestinal: Negative for abdominal pain, diarrhea and vomiting.   Endocrine: Negative for polydipsia, polyphagia and polyuria.   Genitourinary: Negative for dysuria.   Musculoskeletal: Negative for gait problem.   Skin: Negative for rash.   Allergic/Immunologic: Negative for immunocompromised state.   Neurological: Negative for dizziness, syncope and weakness.   Hematological: Does not bruise/bleed easily.   Psychiatric/Behavioral: Negative for behavioral problems.     Objective  Physical Exam   Constitutional: He is oriented to person, place, and time. He appears well-developed and well-nourished.   Cardiovascular: Normal rate, regular rhythm and normal heart sounds.    Pulmonary/Chest: Effort normal and breath sounds normal.   Abdominal: Soft.   Neurological: He is alert and oriented to person, place, and time.   Skin: Skin is warm, dry and intact.   Psychiatric: He has a normal mood and affect.     1. CAD     PET CONCLUSIONS: NO CLINICALLY SIGNIFICANT STRESS INDUCED PERFUSION DEFECTS as assessed with PET perfusion imaging.  1. There is no evidence of a discrete hemodynamically significant coronary stenosis.   2. Although no clinically significant stress defect is seen, there is resting flow heterogeneity that does not improve after Dipyridamole. These results suggest mild  endothelial dysfunction due to elevated cholesterol, high blood pressure and mild,   diffuse, non-obstructive coronary atherosclerosis without clinically significant, localized perfusion defects.   3. Resting wall motion is physiologic. Stress wall motion is physiologic.   4. There is resting LV dysfunction with a reduced ejection fraction of 37 %. There is stress induced LV dysfunction with a reduced ejection fraction of 41 %.  (normal is >= 51%)  5. The ventricular volumes are normal at rest and stress.   6. The extracardiac distribution of radioactivity is normal.   7. There was no previous study available to compare.    Assessment and plan:  59 yo M with CAD coming in for preop visit for coronary bypass.  Procedure explained to patient, appropriate consents were obtained.  -Proceed to OR on 8/13/2018 for coronary bypass    CTS Attending Note:    I have personally taken the history and examined this patient and agree with the resident's note as stated above. Plan reviewed. Questions answered. He wishes to proceed.

## 2018-08-10 ENCOUNTER — DOCUMENTATION ONLY (OUTPATIENT)
Dept: CARDIOTHORACIC SURGERY | Facility: CLINIC | Age: 58
End: 2018-08-10

## 2018-08-10 NOTE — PROGRESS NOTES
Reminded patient of surgery date/time, to take a Hibiclens shower the night before surgery and the morning of, and to ONLY take Coreg w small sip of water the day of surgery. Patient also told to remain NPO after midnight. Pt verbalized understanding.

## 2018-08-13 ENCOUNTER — ANESTHESIA (OUTPATIENT)
Dept: SURGERY | Facility: HOSPITAL | Age: 58
DRG: 236 | End: 2018-08-13
Payer: COMMERCIAL

## 2018-08-13 ENCOUNTER — HOSPITAL ENCOUNTER (INPATIENT)
Facility: HOSPITAL | Age: 58
LOS: 5 days | Discharge: HOME | DRG: 236 | End: 2018-08-18
Attending: THORACIC SURGERY (CARDIOTHORACIC VASCULAR SURGERY) | Admitting: THORACIC SURGERY (CARDIOTHORACIC VASCULAR SURGERY)
Payer: COMMERCIAL

## 2018-08-13 DIAGNOSIS — Z98.890 HISTORY OF HEART SURGERY: ICD-10-CM

## 2018-08-13 DIAGNOSIS — I25.10 CAD (CORONARY ARTERY DISEASE): ICD-10-CM

## 2018-08-13 DIAGNOSIS — Z95.1 S/P CABG (CORONARY ARTERY BYPASS GRAFT): Primary | ICD-10-CM

## 2018-08-13 DIAGNOSIS — E78.00 PURE HYPERCHOLESTEROLEMIA: ICD-10-CM

## 2018-08-13 DIAGNOSIS — R00.0 TACHYCARDIA: ICD-10-CM

## 2018-08-13 DIAGNOSIS — R07.9 CHEST PAIN: ICD-10-CM

## 2018-08-13 DIAGNOSIS — I49.9 ARRHYTHMIA: ICD-10-CM

## 2018-08-13 LAB
ALLENS TEST: ABNORMAL
ALLENS TEST: ABNORMAL
ANION GAP SERPL CALC-SCNC: 9 MMOL/L
APTT BLDCRRT: 23.6 SEC
BASOPHILS # BLD AUTO: 0.14 K/UL
BASOPHILS NFR BLD: 0.4 %
BUN SERPL-MCNC: 19 MG/DL
CALCIUM SERPL-MCNC: 10.2 MG/DL
CHLORIDE SERPL-SCNC: 107 MMOL/L
CO2 SERPL-SCNC: 22 MMOL/L
CREAT SERPL-MCNC: 1.1 MG/DL
DELSYS: ABNORMAL
DELSYS: ABNORMAL
DIFFERENTIAL METHOD: ABNORMAL
EOSINOPHIL # BLD AUTO: 0.1 K/UL
EOSINOPHIL NFR BLD: 0.2 %
ERYTHROCYTE [DISTWIDTH] IN BLOOD BY AUTOMATED COUNT: 12.9 %
ERYTHROCYTE [SEDIMENTATION RATE] IN BLOOD BY WESTERGREN METHOD: 16 MM/H
ERYTHROCYTE [SEDIMENTATION RATE] IN BLOOD BY WESTERGREN METHOD: 16 MM/H
EST. GFR  (AFRICAN AMERICAN): >60 ML/MIN/1.73 M^2
EST. GFR  (NON AFRICAN AMERICAN): >60 ML/MIN/1.73 M^2
FIO2: 100
FIO2: 50
FIO2: 70
FLOW: 60
FLOW: 60
GLUCOSE SERPL-MCNC: 196 MG/DL (ref 70–110)
GLUCOSE SERPL-MCNC: 211 MG/DL (ref 70–110)
GLUCOSE SERPL-MCNC: 223 MG/DL
HCO3 UR-SCNC: 23.8 MMOL/L (ref 24–28)
HCO3 UR-SCNC: 26 MMOL/L (ref 24–28)
HCO3 UR-SCNC: 26.8 MMOL/L (ref 24–28)
HCO3 UR-SCNC: 27.3 MMOL/L (ref 24–28)
HCO3 UR-SCNC: 34.2 MMOL/L (ref 24–28)
HCT VFR BLD AUTO: 40.8 %
HCT VFR BLD CALC: 28 %PCV (ref 36–54)
HCT VFR BLD CALC: 28 %PCV (ref 36–54)
HCT VFR BLD CALC: 38 %PCV (ref 36–54)
HGB BLD-MCNC: 13.6 G/DL
IMM GRANULOCYTES # BLD AUTO: 0.68 K/UL
IMM GRANULOCYTES NFR BLD AUTO: 2.1 %
INR PPP: 1.1
LDH SERPL L TO P-CCNC: 1.72 MMOL/L (ref 0.5–2.2)
LYMPHOCYTES # BLD AUTO: 3 K/UL
LYMPHOCYTES NFR BLD: 9.2 %
MAGNESIUM SERPL-MCNC: 2.3 MG/DL
MAGNESIUM SERPL-MCNC: 2.3 MG/DL
MCH RBC QN AUTO: 30.6 PG
MCHC RBC AUTO-ENTMCNC: 33.3 G/DL
MCV RBC AUTO: 92 FL
MODE: ABNORMAL
MODE: ABNORMAL
MONOCYTES # BLD AUTO: 1.4 K/UL
MONOCYTES NFR BLD: 4.4 %
NEUTROPHILS # BLD AUTO: 26.7 K/UL
NEUTROPHILS NFR BLD: 83.7 %
NRBC BLD-RTO: 0 /100 WBC
PCO2 BLDA: 36.4 MMHG (ref 35–45)
PCO2 BLDA: 41.3 MMHG (ref 35–45)
PCO2 BLDA: 47.1 MMHG (ref 35–45)
PCO2 BLDA: 47.8 MMHG (ref 35–45)
PCO2 BLDA: 55.6 MMHG (ref 35–45)
PEEP: 5
PEEP: 5
PH SMN: 7.29 [PH] (ref 7.35–7.45)
PH SMN: 7.31 [PH] (ref 7.35–7.45)
PH SMN: 7.43 [PH] (ref 7.35–7.45)
PH SMN: 7.46 [PH] (ref 7.35–7.45)
PH SMN: 7.46 [PH] (ref 7.35–7.45)
PHOSPHATE SERPL-MCNC: 4.8 MG/DL
PHOSPHATE SERPL-MCNC: 4.8 MG/DL
PIP: 30
PIP: 30
PLATELET # BLD AUTO: 256 K/UL
PMV BLD AUTO: 10.6 FL
PO2 BLDA: 251 MMHG (ref 80–100)
PO2 BLDA: 309 MMHG (ref 80–100)
PO2 BLDA: 34 MMHG (ref 40–60)
PO2 BLDA: 424 MMHG (ref 80–100)
PO2 BLDA: 93 MMHG (ref 80–100)
POC BE: -2 MMOL/L
POC BE: 0 MMOL/L
POC BE: 10 MMOL/L
POC BE: 2 MMOL/L
POC BE: 3 MMOL/L
POC IONIZED CALCIUM: 0.97 MMOL/L (ref 1.06–1.42)
POC IONIZED CALCIUM: 1 MMOL/L (ref 1.06–1.42)
POC IONIZED CALCIUM: 1.39 MMOL/L (ref 1.06–1.42)
POC SATURATED O2: 100 % (ref 95–100)
POC SATURATED O2: 67 % (ref 95–100)
POC SATURATED O2: 96 % (ref 95–100)
POC TCO2: 25 MMOL/L (ref 23–27)
POC TCO2: 27 MMOL/L (ref 23–27)
POC TCO2: 28 MMOL/L (ref 23–27)
POC TCO2: 29 MMOL/L (ref 24–29)
POC TCO2: 36 MMOL/L (ref 23–27)
POCT GLUCOSE: 202 MG/DL (ref 70–110)
POCT GLUCOSE: 205 MG/DL (ref 70–110)
POCT GLUCOSE: 232 MG/DL (ref 70–110)
POCT GLUCOSE: 233 MG/DL (ref 70–110)
POTASSIUM BLD-SCNC: 4.6 MMOL/L (ref 3.5–5.1)
POTASSIUM BLD-SCNC: 5.6 MMOL/L (ref 3.5–5.1)
POTASSIUM BLD-SCNC: 6 MMOL/L (ref 3.5–5.1)
POTASSIUM SERPL-SCNC: 4.7 MMOL/L
PROTHROMBIN TIME: 11.8 SEC
PS: 10
PS: 10
RBC # BLD AUTO: 4.45 M/UL
SAMPLE: ABNORMAL
SITE: ABNORMAL
SITE: ABNORMAL
SODIUM BLD-SCNC: 135 MMOL/L (ref 136–145)
SODIUM BLD-SCNC: 138 MMOL/L (ref 136–145)
SODIUM BLD-SCNC: 139 MMOL/L (ref 136–145)
SODIUM SERPL-SCNC: 138 MMOL/L
SP02: 100
SP02: 100
VT: 500
VT: 500
WBC # BLD AUTO: 31.92 K/UL

## 2018-08-13 PROCEDURE — 64450 NJX AA&/STRD OTHER PN/BRANCH: CPT | Mod: 59,RT,ICN, | Performed by: ANESTHESIOLOGY

## 2018-08-13 PROCEDURE — 88307 TISSUE EXAM BY PATHOLOGIST: CPT | Performed by: PATHOLOGY

## 2018-08-13 PROCEDURE — 80048 BASIC METABOLIC PNL TOTAL CA: CPT

## 2018-08-13 PROCEDURE — 63600175 PHARM REV CODE 636 W HCPCS: Performed by: ANESTHESIOLOGY

## 2018-08-13 PROCEDURE — 27100088 HC CELL SAVER

## 2018-08-13 PROCEDURE — 25000003 PHARM REV CODE 250: Performed by: THORACIC SURGERY (CARDIOTHORACIC VASCULAR SURGERY)

## 2018-08-13 PROCEDURE — 76942 ECHO GUIDE FOR BIOPSY: CPT | Mod: 26,59,ICN, | Performed by: ANESTHESIOLOGY

## 2018-08-13 PROCEDURE — 63600175 PHARM REV CODE 636 W HCPCS

## 2018-08-13 PROCEDURE — 37799 UNLISTED PX VASCULAR SURGERY: CPT

## 2018-08-13 PROCEDURE — 83735 ASSAY OF MAGNESIUM: CPT

## 2018-08-13 PROCEDURE — 27201037 HC PRESSURE MONITORING SET UP

## 2018-08-13 PROCEDURE — 36592 COLLECT BLOOD FROM PICC: CPT

## 2018-08-13 PROCEDURE — 84100 ASSAY OF PHOSPHORUS: CPT

## 2018-08-13 PROCEDURE — 88307 TISSUE EXAM BY PATHOLOGIST: CPT | Mod: 26,,, | Performed by: PATHOLOGY

## 2018-08-13 PROCEDURE — 37000009 HC ANESTHESIA EA ADD 15 MINS: Performed by: THORACIC SURGERY (CARDIOTHORACIC VASCULAR SURGERY)

## 2018-08-13 PROCEDURE — 0WBC0ZX EXCISION OF MEDIASTINUM, OPEN APPROACH, DIAGNOSTIC: ICD-10-PCS | Performed by: THORACIC SURGERY (CARDIOTHORACIC VASCULAR SURGERY)

## 2018-08-13 PROCEDURE — 20000000 HC ICU ROOM

## 2018-08-13 PROCEDURE — 5A1221Z PERFORMANCE OF CARDIAC OUTPUT, CONTINUOUS: ICD-10-PCS | Performed by: THORACIC SURGERY (CARDIOTHORACIC VASCULAR SURGERY)

## 2018-08-13 PROCEDURE — 63600175 PHARM REV CODE 636 W HCPCS: Performed by: STUDENT IN AN ORGANIZED HEALTH CARE EDUCATION/TRAINING PROGRAM

## 2018-08-13 PROCEDURE — D9220A PRA ANESTHESIA: Mod: ICN,,, | Performed by: ANESTHESIOLOGY

## 2018-08-13 PROCEDURE — 99900026 HC AIRWAY MAINTENANCE (STAT)

## 2018-08-13 PROCEDURE — 25000003 PHARM REV CODE 250

## 2018-08-13 PROCEDURE — 33533 CABG ARTERIAL SINGLE: CPT | Mod: ,,, | Performed by: THORACIC SURGERY (CARDIOTHORACIC VASCULAR SURGERY)

## 2018-08-13 PROCEDURE — 36000713 HC OR TIME LEV V EA ADD 15 MIN: Performed by: THORACIC SURGERY (CARDIOTHORACIC VASCULAR SURGERY)

## 2018-08-13 PROCEDURE — 94761 N-INVAS EAR/PLS OXIMETRY MLT: CPT

## 2018-08-13 PROCEDURE — 82803 BLOOD GASES ANY COMBINATION: CPT

## 2018-08-13 PROCEDURE — 25000003 PHARM REV CODE 250: Performed by: STUDENT IN AN ORGANIZED HEALTH CARE EDUCATION/TRAINING PROGRAM

## 2018-08-13 PROCEDURE — 85610 PROTHROMBIN TIME: CPT

## 2018-08-13 PROCEDURE — 36620 INSERTION CATHETER ARTERY: CPT | Mod: 59,ICN,, | Performed by: ANESTHESIOLOGY

## 2018-08-13 PROCEDURE — 99900035 HC TECH TIME PER 15 MIN (STAT)

## 2018-08-13 PROCEDURE — 36620 INSERTION CATHETER ARTERY: CPT | Performed by: ANESTHESIOLOGY

## 2018-08-13 PROCEDURE — 27000191 HC C-V MONITORING

## 2018-08-13 PROCEDURE — 85730 THROMBOPLASTIN TIME PARTIAL: CPT

## 2018-08-13 PROCEDURE — 82330 ASSAY OF CALCIUM: CPT

## 2018-08-13 PROCEDURE — 36556 INSERT NON-TUNNEL CV CATH: CPT | Mod: 59,ICN,, | Performed by: ANESTHESIOLOGY

## 2018-08-13 PROCEDURE — 94640 AIRWAY INHALATION TREATMENT: CPT

## 2018-08-13 PROCEDURE — 27201423 OPTIME MED/SURG SUP & DEVICES STERILE SUPPLY: Performed by: THORACIC SURGERY (CARDIOTHORACIC VASCULAR SURGERY)

## 2018-08-13 PROCEDURE — 36556 INSERT NON-TUNNEL CV CATH: CPT

## 2018-08-13 PROCEDURE — 63600175 PHARM REV CODE 636 W HCPCS: Performed by: THORACIC SURGERY (CARDIOTHORACIC VASCULAR SURGERY)

## 2018-08-13 PROCEDURE — 27200953 HC CARDIOPLEGIA SYSTEM

## 2018-08-13 PROCEDURE — 36000712 HC OR TIME LEV V 1ST 15 MIN: Performed by: THORACIC SURGERY (CARDIOTHORACIC VASCULAR SURGERY)

## 2018-08-13 PROCEDURE — 94002 VENT MGMT INPAT INIT DAY: CPT

## 2018-08-13 PROCEDURE — 27000175 HC ADULT BYPASS PUMP

## 2018-08-13 PROCEDURE — 85014 HEMATOCRIT: CPT

## 2018-08-13 PROCEDURE — 27000221 HC OXYGEN, UP TO 24 HOURS

## 2018-08-13 PROCEDURE — A4216 STERILE WATER/SALINE, 10 ML: HCPCS | Performed by: STUDENT IN AN ORGANIZED HEALTH CARE EDUCATION/TRAINING PROGRAM

## 2018-08-13 PROCEDURE — C1729 CATH, DRAINAGE: HCPCS | Performed by: THORACIC SURGERY (CARDIOTHORACIC VASCULAR SURGERY)

## 2018-08-13 PROCEDURE — 85520 HEPARIN ASSAY: CPT

## 2018-08-13 PROCEDURE — 02100Z9 BYPASS CORONARY ARTERY, ONE ARTERY FROM LEFT INTERNAL MAMMARY, OPEN APPROACH: ICD-10-PCS | Performed by: THORACIC SURGERY (CARDIOTHORACIC VASCULAR SURGERY)

## 2018-08-13 PROCEDURE — 37000008 HC ANESTHESIA 1ST 15 MINUTES: Performed by: THORACIC SURGERY (CARDIOTHORACIC VASCULAR SURGERY)

## 2018-08-13 PROCEDURE — 85025 COMPLETE CBC W/AUTO DIFF WBC: CPT

## 2018-08-13 PROCEDURE — 93005 ELECTROCARDIOGRAM TRACING: CPT

## 2018-08-13 PROCEDURE — 84132 ASSAY OF SERUM POTASSIUM: CPT

## 2018-08-13 PROCEDURE — 93010 ELECTROCARDIOGRAM REPORT: CPT | Mod: ,,, | Performed by: INTERNAL MEDICINE

## 2018-08-13 PROCEDURE — 93312 ECHO TRANSESOPHAGEAL: CPT | Mod: 26,59,ICN, | Performed by: ANESTHESIOLOGY

## 2018-08-13 PROCEDURE — 84295 ASSAY OF SERUM SODIUM: CPT

## 2018-08-13 RX ORDER — METOCLOPRAMIDE HYDROCHLORIDE 5 MG/ML
5 INJECTION INTRAMUSCULAR; INTRAVENOUS EVERY 6 HOURS PRN
Status: DISCONTINUED | OUTPATIENT
Start: 2018-08-13 | End: 2018-08-18 | Stop reason: HOSPADM

## 2018-08-13 RX ORDER — SODIUM CHLORIDE 9 MG/ML
INJECTION, SOLUTION INTRAVENOUS CONTINUOUS
Status: DISCONTINUED | OUTPATIENT
Start: 2018-08-13 | End: 2018-08-13

## 2018-08-13 RX ORDER — POTASSIUM CHLORIDE 14.9 MG/ML
20 INJECTION INTRAVENOUS
Status: DISCONTINUED | OUTPATIENT
Start: 2018-08-13 | End: 2018-08-15

## 2018-08-13 RX ORDER — IPRATROPIUM BROMIDE AND ALBUTEROL SULFATE 2.5; .5 MG/3ML; MG/3ML
3 SOLUTION RESPIRATORY (INHALATION) EVERY 4 HOURS PRN
Status: ACTIVE | OUTPATIENT
Start: 2018-08-13 | End: 2018-08-14

## 2018-08-13 RX ORDER — ACETAMINOPHEN 325 MG/1
650 TABLET ORAL EVERY 4 HOURS PRN
Status: DISCONTINUED | OUTPATIENT
Start: 2018-08-13 | End: 2018-08-18 | Stop reason: HOSPADM

## 2018-08-13 RX ORDER — POTASSIUM CHLORIDE 14.9 MG/ML
60 INJECTION INTRAVENOUS
Status: DISCONTINUED | OUTPATIENT
Start: 2018-08-13 | End: 2018-08-15

## 2018-08-13 RX ORDER — BISACODYL 10 MG
10 SUPPOSITORY, RECTAL RECTAL EVERY 12 HOURS PRN
Status: DISCONTINUED | OUTPATIENT
Start: 2018-08-13 | End: 2018-08-18 | Stop reason: HOSPADM

## 2018-08-13 RX ORDER — FENTANYL CITRATE 50 UG/ML
25 INJECTION, SOLUTION INTRAMUSCULAR; INTRAVENOUS EVERY 5 MIN PRN
Status: DISCONTINUED | OUTPATIENT
Start: 2018-08-13 | End: 2018-08-13 | Stop reason: HOSPADM

## 2018-08-13 RX ORDER — FENTANYL CITRATE 50 UG/ML
INJECTION, SOLUTION INTRAMUSCULAR; INTRAVENOUS
Status: DISCONTINUED | OUTPATIENT
Start: 2018-08-13 | End: 2018-08-13

## 2018-08-13 RX ORDER — PROPOFOL 10 MG/ML
5 INJECTION, EMULSION INTRAVENOUS CONTINUOUS
Status: DISCONTINUED | OUTPATIENT
Start: 2018-08-13 | End: 2018-08-14

## 2018-08-13 RX ORDER — FENTANYL CITRATE 50 UG/ML
25 INJECTION, SOLUTION INTRAMUSCULAR; INTRAVENOUS
Status: DISCONTINUED | OUTPATIENT
Start: 2018-08-13 | End: 2018-08-14

## 2018-08-13 RX ORDER — OXYCODONE HYDROCHLORIDE 5 MG/1
5 TABLET ORAL EVERY 4 HOURS PRN
Status: DISCONTINUED | OUTPATIENT
Start: 2018-08-13 | End: 2018-08-18 | Stop reason: HOSPADM

## 2018-08-13 RX ORDER — CEFAZOLIN SODIUM 1 G/3ML
2 INJECTION, POWDER, FOR SOLUTION INTRAMUSCULAR; INTRAVENOUS
Status: COMPLETED | OUTPATIENT
Start: 2018-08-14 | End: 2018-08-15

## 2018-08-13 RX ORDER — KETAMINE HCL IN 0.9 % NACL 50 MG/5 ML
SYRINGE (ML) INTRAVENOUS
Status: DISCONTINUED | OUTPATIENT
Start: 2018-08-13 | End: 2018-08-13

## 2018-08-13 RX ORDER — ACETAMINOPHEN 10 MG/ML
INJECTION, SOLUTION INTRAVENOUS
Status: DISCONTINUED | OUTPATIENT
Start: 2018-08-13 | End: 2018-08-13

## 2018-08-13 RX ORDER — MUPIROCIN 20 MG/G
1 OINTMENT TOPICAL 2 TIMES DAILY
Status: DISCONTINUED | OUTPATIENT
Start: 2018-08-13 | End: 2018-08-18 | Stop reason: HOSPADM

## 2018-08-13 RX ORDER — NITROGLYCERIN 20 MG/100ML
0 INJECTION INTRAVENOUS CONTINUOUS
Status: DISCONTINUED | OUTPATIENT
Start: 2018-08-13 | End: 2018-08-15

## 2018-08-13 RX ORDER — FENTANYL CITRATE 50 UG/ML
50 INJECTION, SOLUTION INTRAMUSCULAR; INTRAVENOUS
Status: DISCONTINUED | OUTPATIENT
Start: 2018-08-21 | End: 2018-08-14

## 2018-08-13 RX ORDER — VASOPRESSIN 20 [USP'U]/ML
INJECTION, SOLUTION INTRAMUSCULAR; SUBCUTANEOUS
Status: DISCONTINUED | OUTPATIENT
Start: 2018-08-13 | End: 2018-08-13

## 2018-08-13 RX ORDER — MAGNESIUM SULFATE HEPTAHYDRATE 40 MG/ML
4 INJECTION, SOLUTION INTRAVENOUS
Status: DISCONTINUED | OUTPATIENT
Start: 2018-08-13 | End: 2018-08-18 | Stop reason: HOSPADM

## 2018-08-13 RX ORDER — ASPIRIN 325 MG
325 TABLET ORAL DAILY
Status: DISCONTINUED | OUTPATIENT
Start: 2018-08-14 | End: 2018-08-15

## 2018-08-13 RX ORDER — PAPAVERINE HYDROCHLORIDE 30 MG/ML
INJECTION INTRAMUSCULAR; INTRAVENOUS
Status: DISCONTINUED | OUTPATIENT
Start: 2018-08-13 | End: 2018-08-13 | Stop reason: HOSPADM

## 2018-08-13 RX ORDER — SODIUM CHLORIDE 0.9 % (FLUSH) 0.9 %
3 SYRINGE (ML) INJECTION EVERY 8 HOURS
Status: DISCONTINUED | OUTPATIENT
Start: 2018-08-13 | End: 2018-08-18 | Stop reason: HOSPADM

## 2018-08-13 RX ORDER — MIDAZOLAM HYDROCHLORIDE 1 MG/ML
4 INJECTION INTRAMUSCULAR; INTRAVENOUS ONCE
Status: COMPLETED | OUTPATIENT
Start: 2018-08-13 | End: 2018-08-13

## 2018-08-13 RX ORDER — NOREPINEPHRINE BITARTRATE/D5W 4MG/250ML
0.02 PLASTIC BAG, INJECTION (ML) INTRAVENOUS CONTINUOUS
Status: DISCONTINUED | OUTPATIENT
Start: 2018-08-13 | End: 2018-08-14

## 2018-08-13 RX ORDER — IPRATROPIUM BROMIDE AND ALBUTEROL SULFATE 2.5; .5 MG/3ML; MG/3ML
3 SOLUTION RESPIRATORY (INHALATION) EVERY 4 HOURS
Status: COMPLETED | OUTPATIENT
Start: 2018-08-14 | End: 2018-08-14

## 2018-08-13 RX ORDER — ROCURONIUM BROMIDE 10 MG/ML
INJECTION, SOLUTION INTRAVENOUS
Status: DISCONTINUED | OUTPATIENT
Start: 2018-08-13 | End: 2018-08-13

## 2018-08-13 RX ORDER — CEFAZOLIN SODIUM 1 G/3ML
2 INJECTION, POWDER, FOR SOLUTION INTRAMUSCULAR; INTRAVENOUS
Status: DISCONTINUED | OUTPATIENT
Start: 2018-08-13 | End: 2018-08-13 | Stop reason: HOSPADM

## 2018-08-13 RX ORDER — TRANEXAMIC ACID 100 MG/ML
INJECTION, SOLUTION INTRAVENOUS CONTINUOUS PRN
Status: DISCONTINUED | OUTPATIENT
Start: 2018-08-13 | End: 2018-08-13

## 2018-08-13 RX ORDER — POTASSIUM CHLORIDE 29.8 MG/ML
40 INJECTION INTRAVENOUS
Status: DISCONTINUED | OUTPATIENT
Start: 2018-08-13 | End: 2018-08-15

## 2018-08-13 RX ORDER — DEXAMETHASONE SODIUM PHOSPHATE 4 MG/ML
INJECTION, SOLUTION INTRA-ARTICULAR; INTRALESIONAL; INTRAMUSCULAR; INTRAVENOUS; SOFT TISSUE
Status: DISCONTINUED | OUTPATIENT
Start: 2018-08-13 | End: 2018-08-13

## 2018-08-13 RX ORDER — MAGNESIUM SULFATE HEPTAHYDRATE 40 MG/ML
2 INJECTION, SOLUTION INTRAVENOUS
Status: DISCONTINUED | OUTPATIENT
Start: 2018-08-13 | End: 2018-08-18 | Stop reason: HOSPADM

## 2018-08-13 RX ORDER — ONDANSETRON 2 MG/ML
INJECTION INTRAMUSCULAR; INTRAVENOUS
Status: DISCONTINUED | OUTPATIENT
Start: 2018-08-13 | End: 2018-08-13

## 2018-08-13 RX ORDER — TRANEXAMIC ACID 100 MG/ML
INJECTION, SOLUTION INTRAVENOUS
Status: DISCONTINUED | OUTPATIENT
Start: 2018-08-13 | End: 2018-08-13

## 2018-08-13 RX ORDER — ASPIRIN 300 MG/1
300 SUPPOSITORY RECTAL ONCE AS NEEDED
Status: ACTIVE | OUTPATIENT
Start: 2018-08-13 | End: 2018-08-13

## 2018-08-13 RX ORDER — DEXTROSE MONOHYDRATE, SODIUM CHLORIDE, AND POTASSIUM CHLORIDE 50; 1.49; 4.5 G/1000ML; G/1000ML; G/1000ML
INJECTION, SOLUTION INTRAVENOUS CONTINUOUS
Status: DISCONTINUED | OUTPATIENT
Start: 2018-08-13 | End: 2018-08-15

## 2018-08-13 RX ORDER — DOCUSATE SODIUM 100 MG/1
100 CAPSULE, LIQUID FILLED ORAL 2 TIMES DAILY
Status: DISCONTINUED | OUTPATIENT
Start: 2018-08-13 | End: 2018-08-18 | Stop reason: HOSPADM

## 2018-08-13 RX ORDER — PROPOFOL 10 MG/ML
VIAL (ML) INTRAVENOUS
Status: DISCONTINUED | OUTPATIENT
Start: 2018-08-13 | End: 2018-08-13

## 2018-08-13 RX ORDER — ALBUMIN HUMAN 50 G/1000ML
500 SOLUTION INTRAVENOUS ONCE AS NEEDED
Status: ACTIVE | OUTPATIENT
Start: 2018-08-13 | End: 2018-08-13

## 2018-08-13 RX ORDER — OXYCODONE HYDROCHLORIDE 5 MG/1
10 TABLET ORAL EVERY 4 HOURS PRN
Status: DISCONTINUED | OUTPATIENT
Start: 2018-08-13 | End: 2018-08-18 | Stop reason: HOSPADM

## 2018-08-13 RX ORDER — ASPIRIN 325 MG
325 TABLET ORAL ONCE
Status: COMPLETED | OUTPATIENT
Start: 2018-08-13 | End: 2018-08-13

## 2018-08-13 RX ORDER — NITROGLYCERIN 20 MG/100ML
INJECTION INTRAVENOUS CONTINUOUS PRN
Status: DISCONTINUED | OUTPATIENT
Start: 2018-08-13 | End: 2018-08-13

## 2018-08-13 RX ORDER — BACITRACIN 50000 [IU]/1
INJECTION, POWDER, FOR SOLUTION INTRAMUSCULAR
Status: DISCONTINUED | OUTPATIENT
Start: 2018-08-13 | End: 2018-08-13 | Stop reason: HOSPADM

## 2018-08-13 RX ORDER — METOPROLOL TARTRATE 25 MG/1
25 TABLET, FILM COATED ORAL
Status: DISCONTINUED | OUTPATIENT
Start: 2018-08-13 | End: 2018-08-13 | Stop reason: HOSPADM

## 2018-08-13 RX ORDER — PROTAMINE SULFATE 10 MG/ML
INJECTION, SOLUTION INTRAVENOUS
Status: DISCONTINUED | OUTPATIENT
Start: 2018-08-13 | End: 2018-08-13

## 2018-08-13 RX ORDER — ONDANSETRON 2 MG/ML
4 INJECTION INTRAMUSCULAR; INTRAVENOUS EVERY 12 HOURS PRN
Status: DISCONTINUED | OUTPATIENT
Start: 2018-08-13 | End: 2018-08-18 | Stop reason: HOSPADM

## 2018-08-13 RX ORDER — LIDOCAINE HYDROCHLORIDE 10 MG/ML
1 INJECTION, SOLUTION EPIDURAL; INFILTRATION; INTRACAUDAL; PERINEURAL
Status: COMPLETED | OUTPATIENT
Start: 2018-08-13 | End: 2018-08-13

## 2018-08-13 RX ORDER — HEPARIN SODIUM 1000 [USP'U]/ML
INJECTION, SOLUTION INTRAVENOUS; SUBCUTANEOUS
Status: DISCONTINUED | OUTPATIENT
Start: 2018-08-13 | End: 2018-08-13

## 2018-08-13 RX ORDER — MIDAZOLAM HYDROCHLORIDE 1 MG/ML
INJECTION INTRAMUSCULAR; INTRAVENOUS
Status: DISCONTINUED | OUTPATIENT
Start: 2018-08-13 | End: 2018-08-13

## 2018-08-13 RX ORDER — HEPARIN SODIUM 1000 [USP'U]/ML
INJECTION, SOLUTION INTRAVENOUS; SUBCUTANEOUS
Status: DISCONTINUED | OUTPATIENT
Start: 2018-08-13 | End: 2018-08-13 | Stop reason: HOSPADM

## 2018-08-13 RX ORDER — LIDOCAINE HCL/PF 100 MG/5ML
SYRINGE (ML) INTRAVENOUS
Status: DISCONTINUED | OUTPATIENT
Start: 2018-08-13 | End: 2018-08-13

## 2018-08-13 RX ORDER — MUPIROCIN 20 MG/G
1 OINTMENT TOPICAL
Status: COMPLETED | OUTPATIENT
Start: 2018-08-13 | End: 2018-08-13

## 2018-08-13 RX ORDER — POLYETHYLENE GLYCOL 3350 17 G/17G
17 POWDER, FOR SOLUTION ORAL DAILY
Status: DISCONTINUED | OUTPATIENT
Start: 2018-08-14 | End: 2018-08-18 | Stop reason: HOSPADM

## 2018-08-13 RX ADMIN — LIDOCAINE HYDROCHLORIDE 100 MG: 20 INJECTION, SOLUTION INTRAVENOUS at 06:08

## 2018-08-13 RX ADMIN — PROPOFOL 100 MG: 10 INJECTION, EMULSION INTRAVENOUS at 03:08

## 2018-08-13 RX ADMIN — FENTANYL CITRATE 50 MCG: 50 INJECTION INTRAMUSCULAR; INTRAVENOUS at 01:08

## 2018-08-13 RX ADMIN — PROPOFOL 40 MCG/KG/MIN: 10 INJECTION, EMULSION INTRAVENOUS at 10:08

## 2018-08-13 RX ADMIN — MIDAZOLAM HYDROCHLORIDE 2 MG: 1 INJECTION, SOLUTION INTRAMUSCULAR; INTRAVENOUS at 05:08

## 2018-08-13 RX ADMIN — DEXAMETHASONE SODIUM PHOSPHATE 8 MG: 4 INJECTION, SOLUTION INTRAMUSCULAR; INTRAVENOUS at 03:08

## 2018-08-13 RX ADMIN — Medication 0.02 MCG/KG/MIN: at 10:08

## 2018-08-13 RX ADMIN — IPRATROPIUM BROMIDE AND ALBUTEROL SULFATE 3 ML: .5; 3 SOLUTION RESPIRATORY (INHALATION) at 11:08

## 2018-08-13 RX ADMIN — ROCURONIUM BROMIDE 20 MG: 10 INJECTION, SOLUTION INTRAVENOUS at 04:08

## 2018-08-13 RX ADMIN — Medication 20 MG: at 04:08

## 2018-08-13 RX ADMIN — VASOPRESSIN 0.08 UNITS/MIN: 20 INJECTION INTRAVENOUS at 10:08

## 2018-08-13 RX ADMIN — HEPARIN SODIUM 21000 UNITS: 1000 INJECTION, SOLUTION INTRAVENOUS; SUBCUTANEOUS at 05:08

## 2018-08-13 RX ADMIN — NOREPINEPHRINE BITARTRATE 0.04 MCG/KG/MIN: 1 INJECTION, SOLUTION, CONCENTRATE INTRAVENOUS at 03:08

## 2018-08-13 RX ADMIN — PROTAMINE SULFATE 250 MG: 10 INJECTION, SOLUTION INTRAVENOUS at 07:08

## 2018-08-13 RX ADMIN — ACETAMINOPHEN 1000 MG: 10 INJECTION, SOLUTION INTRAVENOUS at 03:08

## 2018-08-13 RX ADMIN — HEPARIN SODIUM 5000 UNITS: 1000 INJECTION, SOLUTION INTRAVENOUS; SUBCUTANEOUS at 05:08

## 2018-08-13 RX ADMIN — MIDAZOLAM HYDROCHLORIDE 2 MG: 1 INJECTION, SOLUTION INTRAMUSCULAR; INTRAVENOUS at 01:08

## 2018-08-13 RX ADMIN — CEFAZOLIN 2 G: 1 INJECTION, POWDER, FOR SOLUTION INTRAMUSCULAR; INTRAVENOUS at 11:08

## 2018-08-13 RX ADMIN — PROPOFOL 20 MG: 10 INJECTION, EMULSION INTRAVENOUS at 04:08

## 2018-08-13 RX ADMIN — ASPIRIN 325 MG ORAL TABLET 325 MG: 325 PILL ORAL at 11:08

## 2018-08-13 RX ADMIN — SODIUM CHLORIDE 2.6 UNITS/HR: 9 INJECTION, SOLUTION INTRAVENOUS at 10:08

## 2018-08-13 RX ADMIN — FENTANYL CITRATE 25 MCG: 50 INJECTION INTRAMUSCULAR; INTRAVENOUS at 11:08

## 2018-08-13 RX ADMIN — TRANEXAMIC ACID 800 MG: 100 INJECTION, SOLUTION INTRAVENOUS at 03:08

## 2018-08-13 RX ADMIN — MUPIROCIN 1 G: 20 OINTMENT TOPICAL at 10:08

## 2018-08-13 RX ADMIN — MIDAZOLAM HYDROCHLORIDE 2 MG: 1 INJECTION, SOLUTION INTRAMUSCULAR; INTRAVENOUS at 08:08

## 2018-08-13 RX ADMIN — ROCURONIUM BROMIDE 30 MG: 10 INJECTION, SOLUTION INTRAVENOUS at 05:08

## 2018-08-13 RX ADMIN — TRANEXAMIC ACID 1 MG/KG/HR: 100 INJECTION, SOLUTION INTRAVENOUS at 03:08

## 2018-08-13 RX ADMIN — ROCURONIUM BROMIDE 100 MG: 10 INJECTION, SOLUTION INTRAVENOUS at 03:08

## 2018-08-13 RX ADMIN — MUPIROCIN 1 G: 20 OINTMENT TOPICAL at 01:08

## 2018-08-13 RX ADMIN — LIDOCAINE HYDROCHLORIDE 80 MG: 20 INJECTION, SOLUTION INTRAVENOUS at 03:08

## 2018-08-13 RX ADMIN — VASOPRESSIN 1 UNITS: 20 INJECTION INTRAVENOUS at 05:08

## 2018-08-13 RX ADMIN — DEXTROSE MONOHYDRATE, SODIUM CHLORIDE, AND POTASSIUM CHLORIDE: 50; 4.5; 1.49 INJECTION, SOLUTION INTRAVENOUS at 10:08

## 2018-08-13 RX ADMIN — FENTANYL CITRATE 100 MCG: 50 INJECTION, SOLUTION INTRAMUSCULAR; INTRAVENOUS at 07:08

## 2018-08-13 RX ADMIN — FENTANYL CITRATE 150 MCG: 50 INJECTION, SOLUTION INTRAMUSCULAR; INTRAVENOUS at 04:08

## 2018-08-13 RX ADMIN — EPINEPHRINE 0.02 MCG/KG/MIN: 1 INJECTION INTRAMUSCULAR; INTRAVENOUS; SUBCUTANEOUS at 03:08

## 2018-08-13 RX ADMIN — LIDOCAINE HYDROCHLORIDE 0.1 MG: 10 INJECTION, SOLUTION EPIDURAL; INFILTRATION; INTRACAUDAL; PERINEURAL at 01:08

## 2018-08-13 RX ADMIN — NITROGLYCERIN 0.2 MCG/KG/MIN: 200 INJECTION, SOLUTION INTRAVENOUS at 08:08

## 2018-08-13 RX ADMIN — FENTANYL CITRATE 100 MCG: 50 INJECTION, SOLUTION INTRAMUSCULAR; INTRAVENOUS at 03:08

## 2018-08-13 RX ADMIN — VASOPRESSIN 0.04 UNITS/MIN: 20 INJECTION INTRAVENOUS at 05:08

## 2018-08-13 RX ADMIN — NITROGLYCERIN 0 MG/KG/MIN: 20 INJECTION INTRAVENOUS at 10:08

## 2018-08-13 RX ADMIN — Medication 10 MG: at 07:08

## 2018-08-13 RX ADMIN — SODIUM CHLORIDE, SODIUM GLUCONATE, SODIUM ACETATE, POTASSIUM CHLORIDE, MAGNESIUM CHLORIDE, SODIUM PHOSPHATE, DIBASIC, AND POTASSIUM PHOSPHATE: .53; .5; .37; .037; .03; .012; .00082 INJECTION, SOLUTION INTRAVENOUS at 02:08

## 2018-08-13 RX ADMIN — ROCURONIUM BROMIDE 30 MG: 10 INJECTION, SOLUTION INTRAVENOUS at 06:08

## 2018-08-13 RX ADMIN — Medication 3 ML: at 10:08

## 2018-08-13 RX ADMIN — EPINEPHRINE 0.1 MCG/KG/MIN: 1 INJECTION INTRAMUSCULAR; INTRAVENOUS; SUBCUTANEOUS at 10:08

## 2018-08-13 RX ADMIN — CALCIUM CHLORIDE 1 G: 100 INJECTION, SOLUTION INTRAVENOUS at 06:08

## 2018-08-13 RX ADMIN — SODIUM CHLORIDE: 0.9 INJECTION, SOLUTION INTRAVENOUS at 01:08

## 2018-08-13 RX ADMIN — SODIUM CHLORIDE 2 G: 9 INJECTION, SOLUTION INTRAVENOUS at 03:08

## 2018-08-13 RX ADMIN — MIDAZOLAM HYDROCHLORIDE 2 MG: 1 INJECTION, SOLUTION INTRAMUSCULAR; INTRAVENOUS at 02:08

## 2018-08-13 NOTE — HPI
Mr. Campbell is a 58-year-old male with a past medical history of chronic systolic CHF, CAD, HTN. Heart cath on 6/28/18 showed two vessel coronary artery disease involving mainly proximal LAD and distal small non-dominant left circumflex. His most recent echo on 6/22/18 showed moderately-severely depressed LV systolic function (EF 30%), LV global hypokinesis with lateral wall motion abnormality, and grade 1 diastolic dysfunction. He smokes 1 ppd. 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 His EBL was 100 cc, he did not require any blood products in the OR, and he was transported from the OR intubated.

## 2018-08-13 NOTE — ANESTHESIA PROCEDURE NOTES
Central Line    Diagnosis: CAD  Patient location during procedure: pre-op  Procedure start time: 8/13/2018 1:31 PM  Timeout: 8/13/2018 1:30 PM  Procedure end time: 8/13/2018 1:45 PM  Staffing  Anesthesiologist: Suraj Jalloh MD  Other anesthesia staff: Kishan Paniagua MD  Anesthesiologist was present at the time of the procedure.  Preanesthetic Checklist  Completed: patient identified, site marked, surgical consent, pre-op evaluation, timeout performed, IV checked, risks and benefits discussed, monitors and equipment checked and anesthesia consent given  Indication  Indication: hemodynamic monitoring, vascular access, med administration     Anesthesia   local infiltration    Central Line  Skin Prep: skin prepped with ChloraPrep, skin prep agent completely dried prior to procedure  maximum sterile barriers used during central venous catheter insertion  hand hygiene performed prior to central venous catheter insertion  Location: right internal jugular,   Catheter type: quad lumen  Catheter Size: 8.5 Fr  Ultrasound: vascular probe with ultrasound  Vessel Caliber: medium, patent, compressibility normal  Needle advanced into vessel with real time Ultrasound guidance.  Sterile sheath used.   Manometry: Venous cannualation confirmed by visual estimation of blood vessel pressure using manometry.  Insertion Attempts: 1   Securement:line sutured, chlorhexidine patch, sterile dressing applied and blood return through all ports     Post-Procedure  X-Ray: successful placement  Adverse Events:none

## 2018-08-13 NOTE — ANESTHESIA PROCEDURE NOTES
Baseline FESTUS    Diagnosis: CAD  Patient location during procedure: OR  Procedure start time: 8/13/2018 3:21 PM  Timeout: 8/13/2018 3:20 PM  Procedure end time: 8/13/2018 3:40 PM  Exam type: Baseline  Staffing  Anesthesiologist: Suraj Jalloh MD  Other anesthesia staff: Donal Hardy MD  Performed: other anesthesia staff   Preanesthetic Checklist  Completed: patient identified, surgical consent, pre-op evaluation, timeout performed, risks and benefits discussed, monitors and equipment checked, anesthesia consent given, oxygen available, suction available, hand hygiene performed and patient being monitored  Setup & Induction  Patient preparation: bite block inserted  Probe Insertion: easy  Exam: complete  Exam         LVAD:no  Estimated Ejection Fraction: 35-44% moderate  Regional Wall Abnormalities: no RWMA            Right Heart  Right Ventricle: normal  Right Ventricle Function: normal    Intra Atrial Septum  PFO: no shunt by color flow doppler  no IAS aneurysm  no lipomatous hypertrophy  no Atrial Septal Defect (Asd)    Right Ventricle  Size: normal, Free Wall Thickness: normal    Aortic Valve:  Stenosis: none  Morphology: trileaflet  Regurgitation: no aortic valve regurgitation     Mitral Valve:  Morphology:normal  Jet Description: mild and centrally-directed    Tricuspid Valve:  Morphology: normal  Regurgitation: none    Pulmonic Valve:  Morphology:normal  Regurgitation(color flow): none    Great Vessels  Ascending Aorta Atherosclerosis: 1=nl-min dz  Aortic Arch Atherosclerosis: 3=sessile dz (3-5mm)  IABP: no  Descending Aorta Atherosclerosis: 3=sessile dz (3-5mm)  Aorta    Descending aorta IABP: no    Effusions  Effusions: none    Summary  Findings discussed with surgeon.    Other Findings   Mild to moderately depressed global systolic function. No valvular abnormalities noted. Grade 3-4 atheromatous disease noted in proximal descending and distal arch.

## 2018-08-13 NOTE — INTERVAL H&P NOTE
The patient has been examined and the H&P has been reviewed:    I concur with the findings and no changes have occurred since H&P was written.  Plan for LIMA-LAD.    Anesthesia/Surgery risks, benefits and alternative options discussed and understood by patient/family.      Active Hospital Problems    Diagnosis  POA    CAD (coronary artery disease) [I25.10]  Yes      Resolved Hospital Problems   No resolved problems to display.     Alfredito Guy MD  Thoracic Surgery Resident, PGY7  Cardiothoracic Surgery  [facility]

## 2018-08-13 NOTE — ANESTHESIA PROCEDURE NOTES
Peripheral    Patient location during procedure: OR   Block not for primary anesthetic.  Reason for block: at surgeon's request and post-op pain management   Post-op Pain Location: Sternum  Start time: 8/13/2018 3:20 PM  Timeout: 8/13/2018 3:10 PM   End time: 8/13/2018 3:30 PM  Staffing  Anesthesiologist: Suraj Jalloh MD  Other anesthesia staff: Kishan Paniagua MD  Preanesthetic Checklist  Completed: patient identified, site marked, surgical consent, pre-op evaluation, timeout performed, IV checked, risks and benefits discussed and monitors and equipment checked  Peripheral Block  Patient position: supine  Prep: ChloraPrep  Patient monitoring: heart rate, cardiac monitor, continuous pulse ox, continuous capnometry and frequent blood pressure checks  Anesthesia block type: transverse thoracic.  Laterality: bilateral  Injection technique: single shot  Needle  Needle type: Stimuplex   Needle gauge: 22 G  Needle length: 2 in  Needle localization: ultrasound guidance   -ultrasound image captured on disc.  Assessment  Injection assessment: negative aspiration, negative parasthesia and local visualized surrounding nerve  Paresthesia pain: none  Heart rate change: no  Slow fractionated injection: yes  Additional Notes  15cc of 0.5% bupivacaine injected bilaterally under ultrasound guidance.  No evidence of pneumothorax or intravascular injection

## 2018-08-13 NOTE — ASSESSMENT & PLAN NOTE
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 POD 0.    Plan:    Neuro:   -Intubated  -Sedation: Propofol gtt  -Pain control: PRN acetaminophen, oxycodone, fentanyl  -Daily sedation vacations    Pulmonary:   -Intubated  -Vent settings  Vent Mode: SIMV  Oxygen Concentration (%):  [100] 100  Resp Rate Total:  [17 br/min] 17 br/min  Vt Set:  [500 mL] 500 mL  PEEP/CPAP:  [5 cmH20] 5 cmH20  Pressure Support:  [10 cmH20] 10 cmH20  Mean Airway Pressure:  [9.1 cmH20] 9.1 cmH20  -Daily SBT    Cardiac:  -S/p CABG x1 for which LAD was only bypassable lesion  -MAP goal  to increase coronary perfusion  -Drips: Epi 0.1 mcg/kg/min, Levo 0.02 mcg/kg/min, Vasopressin 0.08 mcg/kg/min, Nitroglycerin 0.2 mcg/kg/min  - mg qd    Renal:   -Wheeler in place  -Monitor UOP post-op  -Monitor Bun/Cr post-op     Fluids/Electrolytes/Nutrition/GI:   -Nutritional status: NPO  -replace lytes PRN  -Fluids: D5 1/2 NS w/ KCl 20 mEq at 5 cc/hr  -Bowel regimen  -Prophylaxis: Pantoprazole    Hematology/Oncology:  -H/H 13.6/40.8 post-op  -INR/Plts 1.1/256 post-op    Infectious Disease:   -Afebrile  -WBC 31.92 post-op  -Abx: Ancef surgical ppx  -Continue to monitor    Endocrine:  -Glucose goal per primary  -Insulin gtt    Prophylaxis:  -Pantoprazole    Dispo:  -Continue care in the ICU setting  -Primary: CTS

## 2018-08-13 NOTE — ANESTHESIA PROCEDURE NOTES
Arterial    Diagnosis: cad    Patient location during procedure: pre-op  Procedure start time: 8/13/2018 1:16 PM  Timeout: 8/13/2018 1:15 PM  Procedure end time: 8/13/2018 1:25 PM  Staffing  Anesthesiologist: Suraj Jalloh MD  Other anesthesia staff: Kishan Paniagua MD  Anesthesiologist was present at the time of the procedure.  Preanesthetic Checklist  Completed: patient identified, site marked, surgical consent, pre-op evaluation, timeout performed, IV checked, risks and benefits discussed, monitors and equipment checked and anesthesia consent givenArterial  Skin Prep: chlorhexidine gluconate  Local Infiltration: lidocaine  Orientation: left  Location: radial  Catheter Size: 20 G  Catheter placement by Ultrasound guidance. Heme positive aspiration all ports.  Vessel Caliber: medium, patent, compressibility normal  Guidewire confirmed in vessel.  Sterile sheath used.Insertion Attempts: 2  Assessment  Dressing: secured with tape and tegaderm  Patient: Tolerated well

## 2018-08-14 PROBLEM — Z95.1 S/P CABG (CORONARY ARTERY BYPASS GRAFT): Status: ACTIVE | Noted: 2018-08-14

## 2018-08-14 PROBLEM — R73.9 STRESS HYPERGLYCEMIA: Status: ACTIVE | Noted: 2018-08-14

## 2018-08-14 LAB
ALLENS TEST: ABNORMAL
ALLENS TEST: ABNORMAL
ANION GAP SERPL CALC-SCNC: 12 MMOL/L
ANION GAP SERPL CALC-SCNC: 7 MMOL/L
ANISOCYTOSIS BLD QL SMEAR: SLIGHT
APTT BLDCRRT: <21 SEC
BASOPHILS # BLD AUTO: 0.06 K/UL
BASOPHILS NFR BLD: 0.2 %
BUN SERPL-MCNC: 23 MG/DL
BUN SERPL-MCNC: 24 MG/DL
CALCIUM SERPL-MCNC: 8.4 MG/DL
CALCIUM SERPL-MCNC: 9.3 MG/DL
CHLORIDE SERPL-SCNC: 108 MMOL/L
CHLORIDE SERPL-SCNC: 108 MMOL/L
CO2 SERPL-SCNC: 18 MMOL/L
CO2 SERPL-SCNC: 26 MMOL/L
CREAT SERPL-MCNC: 0.9 MG/DL
CREAT SERPL-MCNC: 1.3 MG/DL
DELSYS: ABNORMAL
DELSYS: ABNORMAL
DIFFERENTIAL METHOD: ABNORMAL
EOSINOPHIL # BLD AUTO: 0 K/UL
EOSINOPHIL NFR BLD: 0 %
ERYTHROCYTE [DISTWIDTH] IN BLOOD BY AUTOMATED COUNT: 12.8 %
ERYTHROCYTE [SEDIMENTATION RATE] IN BLOOD BY WESTERGREN METHOD: 16 MM/H
EST. GFR  (AFRICAN AMERICAN): >60 ML/MIN/1.73 M^2
EST. GFR  (AFRICAN AMERICAN): >60 ML/MIN/1.73 M^2
EST. GFR  (NON AFRICAN AMERICAN): >60 ML/MIN/1.73 M^2
EST. GFR  (NON AFRICAN AMERICAN): >60 ML/MIN/1.73 M^2
FIO2: 40
FIO2: 40
FLOW: 60
GLUCOSE SERPL-MCNC: 102 MG/DL (ref 70–110)
GLUCOSE SERPL-MCNC: 136 MG/DL
GLUCOSE SERPL-MCNC: 161 MG/DL (ref 70–110)
GLUCOSE SERPL-MCNC: 192 MG/DL
GLUCOSE SERPL-MCNC: 202 MG/DL (ref 70–110)
GLUCOSE SERPL-MCNC: 210 MG/DL (ref 70–110)
HCO3 UR-SCNC: 15.7 MMOL/L (ref 24–28)
HCO3 UR-SCNC: 19.5 MMOL/L (ref 24–28)
HCO3 UR-SCNC: 25.2 MMOL/L (ref 24–28)
HCO3 UR-SCNC: 25.9 MMOL/L (ref 24–28)
HCO3 UR-SCNC: 26 MMOL/L (ref 24–28)
HCO3 UR-SCNC: 28.1 MMOL/L (ref 24–28)
HCT VFR BLD AUTO: 40 %
HCT VFR BLD CALC: 32 %PCV (ref 36–54)
HCT VFR BLD CALC: 33 %PCV (ref 36–54)
HCT VFR BLD CALC: 35 %PCV (ref 36–54)
HCT VFR BLD CALC: 37 %PCV (ref 36–54)
HGB BLD-MCNC: 13.5 G/DL
IMM GRANULOCYTES # BLD AUTO: 0.27 K/UL
IMM GRANULOCYTES NFR BLD AUTO: 1 %
INR PPP: 1
LYMPHOCYTES # BLD AUTO: 1.7 K/UL
LYMPHOCYTES NFR BLD: 6.3 %
MAGNESIUM SERPL-MCNC: 1.7 MG/DL
MAGNESIUM SERPL-MCNC: 2 MG/DL
MCH RBC QN AUTO: 30.8 PG
MCHC RBC AUTO-ENTMCNC: 33.8 G/DL
MCV RBC AUTO: 91 FL
MODE: ABNORMAL
MODE: ABNORMAL
MONOCYTES # BLD AUTO: 1.8 K/UL
MONOCYTES NFR BLD: 6.8 %
NEUTROPHILS # BLD AUTO: 22.3 K/UL
NEUTROPHILS NFR BLD: 85.7 %
NRBC BLD-RTO: 0 /100 WBC
PCO2 BLDA: 23.2 MMHG (ref 35–45)
PCO2 BLDA: 39 MMHG (ref 35–45)
PCO2 BLDA: 47.1 MMHG (ref 35–45)
PCO2 BLDA: 49.4 MMHG (ref 35–45)
PCO2 BLDA: 52.4 MMHG (ref 35–45)
PCO2 BLDA: 53.4 MMHG (ref 35–45)
PEEP: 5
PEEP: 5
PH SMN: 7.29 [PH] (ref 7.35–7.45)
PH SMN: 7.31 [PH] (ref 7.35–7.45)
PH SMN: 7.33 [PH] (ref 7.35–7.45)
PH SMN: 7.33 [PH] (ref 7.35–7.45)
PH SMN: 7.35 [PH] (ref 7.35–7.45)
PH SMN: 7.44 [PH] (ref 7.35–7.45)
PHOSPHATE SERPL-MCNC: 2.7 MG/DL
PHOSPHATE SERPL-MCNC: 2.7 MG/DL
PIP: 21
PLATELET # BLD AUTO: 260 K/UL
PLATELET BLD QL SMEAR: ABNORMAL
PMV BLD AUTO: 10.3 FL
PO2 BLDA: 132 MMHG (ref 80–100)
PO2 BLDA: 269 MMHG (ref 80–100)
PO2 BLDA: 481 MMHG (ref 80–100)
PO2 BLDA: 83 MMHG (ref 80–100)
PO2 BLDA: 94 MMHG (ref 80–100)
PO2 BLDA: 97 MMHG (ref 80–100)
POC BE: -1 MMOL/L
POC BE: -7 MMOL/L
POC BE: -9 MMOL/L
POC BE: 0 MMOL/L
POC BE: 0 MMOL/L
POC BE: 2 MMOL/L
POC IONIZED CALCIUM: 1.13 MMOL/L (ref 1.06–1.42)
POC IONIZED CALCIUM: 1.18 MMOL/L (ref 1.06–1.42)
POC IONIZED CALCIUM: 1.28 MMOL/L (ref 1.06–1.42)
POC IONIZED CALCIUM: 1.47 MMOL/L (ref 1.06–1.42)
POC SATURATED O2: 100 % (ref 95–100)
POC SATURATED O2: 100 % (ref 95–100)
POC SATURATED O2: 97 % (ref 95–100)
POC SATURATED O2: 99 % (ref 95–100)
POC TCO2: 16 MMOL/L (ref 23–27)
POC TCO2: 21 MMOL/L (ref 23–27)
POC TCO2: 27 MMOL/L (ref 23–27)
POC TCO2: 30 MMOL/L (ref 23–27)
POCT GLUCOSE: 101 MG/DL (ref 70–110)
POCT GLUCOSE: 106 MG/DL (ref 70–110)
POCT GLUCOSE: 113 MG/DL (ref 70–110)
POCT GLUCOSE: 119 MG/DL (ref 70–110)
POCT GLUCOSE: 122 MG/DL (ref 70–110)
POCT GLUCOSE: 124 MG/DL (ref 70–110)
POCT GLUCOSE: 124 MG/DL (ref 70–110)
POCT GLUCOSE: 126 MG/DL (ref 70–110)
POCT GLUCOSE: 133 MG/DL (ref 70–110)
POCT GLUCOSE: 133 MG/DL (ref 70–110)
POCT GLUCOSE: 135 MG/DL (ref 70–110)
POCT GLUCOSE: 136 MG/DL (ref 70–110)
POCT GLUCOSE: 140 MG/DL (ref 70–110)
POCT GLUCOSE: 149 MG/DL (ref 70–110)
POCT GLUCOSE: 159 MG/DL (ref 70–110)
POCT GLUCOSE: 179 MG/DL (ref 70–110)
POCT GLUCOSE: 185 MG/DL (ref 70–110)
POCT GLUCOSE: 193 MG/DL (ref 70–110)
POCT GLUCOSE: 198 MG/DL (ref 70–110)
POCT GLUCOSE: 198 MG/DL (ref 70–110)
POCT GLUCOSE: 59 MG/DL (ref 70–110)
POCT GLUCOSE: 70 MG/DL (ref 70–110)
POCT GLUCOSE: 70 MG/DL (ref 70–110)
POCT GLUCOSE: 95 MG/DL (ref 70–110)
POCT GLUCOSE: 98 MG/DL (ref 70–110)
POIKILOCYTOSIS BLD QL SMEAR: SLIGHT
POTASSIUM BLD-SCNC: 4.1 MMOL/L (ref 3.5–5.1)
POTASSIUM BLD-SCNC: 4.3 MMOL/L (ref 3.5–5.1)
POTASSIUM BLD-SCNC: 4.3 MMOL/L (ref 3.5–5.1)
POTASSIUM BLD-SCNC: 4.8 MMOL/L (ref 3.5–5.1)
POTASSIUM SERPL-SCNC: 3.8 MMOL/L
POTASSIUM SERPL-SCNC: 4.2 MMOL/L
POTASSIUM SERPL-SCNC: 4.2 MMOL/L
POTASSIUM SERPL-SCNC: 4.5 MMOL/L
PROTHROMBIN TIME: 10.8 SEC
PS: 10
PS: 5
RBC # BLD AUTO: 4.39 M/UL
SAMPLE: ABNORMAL
SITE: ABNORMAL
SITE: ABNORMAL
SODIUM BLD-SCNC: 137 MMOL/L (ref 136–145)
SODIUM BLD-SCNC: 138 MMOL/L (ref 136–145)
SODIUM BLD-SCNC: 138 MMOL/L (ref 136–145)
SODIUM BLD-SCNC: 139 MMOL/L (ref 136–145)
SODIUM SERPL-SCNC: 138 MMOL/L
SODIUM SERPL-SCNC: 141 MMOL/L
SP02: 99
SP02: 99
VT: 500
WBC # BLD AUTO: 26 K/UL

## 2018-08-14 PROCEDURE — 63600175 PHARM REV CODE 636 W HCPCS: Mod: JG | Performed by: STUDENT IN AN ORGANIZED HEALTH CARE EDUCATION/TRAINING PROGRAM

## 2018-08-14 PROCEDURE — 99900026 HC AIRWAY MAINTENANCE (STAT)

## 2018-08-14 PROCEDURE — P9045 ALBUMIN (HUMAN), 5%, 250 ML: HCPCS | Mod: JG | Performed by: STUDENT IN AN ORGANIZED HEALTH CARE EDUCATION/TRAINING PROGRAM

## 2018-08-14 PROCEDURE — 82803 BLOOD GASES ANY COMBINATION: CPT

## 2018-08-14 PROCEDURE — 94761 N-INVAS EAR/PLS OXIMETRY MLT: CPT

## 2018-08-14 PROCEDURE — 93010 ELECTROCARDIOGRAM REPORT: CPT | Mod: ,,, | Performed by: INTERNAL MEDICINE

## 2018-08-14 PROCEDURE — C9113 INJ PANTOPRAZOLE SODIUM, VIA: HCPCS | Performed by: STUDENT IN AN ORGANIZED HEALTH CARE EDUCATION/TRAINING PROGRAM

## 2018-08-14 PROCEDURE — 80048 BASIC METABOLIC PNL TOTAL CA: CPT

## 2018-08-14 PROCEDURE — 84100 ASSAY OF PHOSPHORUS: CPT | Mod: 91

## 2018-08-14 PROCEDURE — 63600175 PHARM REV CODE 636 W HCPCS: Mod: JG | Performed by: THORACIC SURGERY (CARDIOTHORACIC VASCULAR SURGERY)

## 2018-08-14 PROCEDURE — P9045 ALBUMIN (HUMAN), 5%, 250 ML: HCPCS | Mod: JG

## 2018-08-14 PROCEDURE — 63600175 PHARM REV CODE 636 W HCPCS: Performed by: STUDENT IN AN ORGANIZED HEALTH CARE EDUCATION/TRAINING PROGRAM

## 2018-08-14 PROCEDURE — 99222 1ST HOSP IP/OBS MODERATE 55: CPT | Mod: ,,, | Performed by: NURSE PRACTITIONER

## 2018-08-14 PROCEDURE — P9045 ALBUMIN (HUMAN), 5%, 250 ML: HCPCS | Mod: JG | Performed by: THORACIC SURGERY (CARDIOTHORACIC VASCULAR SURGERY)

## 2018-08-14 PROCEDURE — 85610 PROTHROMBIN TIME: CPT

## 2018-08-14 PROCEDURE — 84100 ASSAY OF PHOSPHORUS: CPT

## 2018-08-14 PROCEDURE — 84132 ASSAY OF SERUM POTASSIUM: CPT

## 2018-08-14 PROCEDURE — 93005 ELECTROCARDIOGRAM TRACING: CPT

## 2018-08-14 PROCEDURE — 99900035 HC TECH TIME PER 15 MIN (STAT)

## 2018-08-14 PROCEDURE — 63600175 PHARM REV CODE 636 W HCPCS: Mod: JG

## 2018-08-14 PROCEDURE — 20000000 HC ICU ROOM

## 2018-08-14 PROCEDURE — 94799 UNLISTED PULMONARY SVC/PX: CPT

## 2018-08-14 PROCEDURE — 94640 AIRWAY INHALATION TREATMENT: CPT

## 2018-08-14 PROCEDURE — 25000242 PHARM REV CODE 250 ALT 637 W/ HCPCS: Performed by: STUDENT IN AN ORGANIZED HEALTH CARE EDUCATION/TRAINING PROGRAM

## 2018-08-14 PROCEDURE — 37799 UNLISTED PX VASCULAR SURGERY: CPT

## 2018-08-14 PROCEDURE — 80048 BASIC METABOLIC PNL TOTAL CA: CPT | Mod: 91

## 2018-08-14 PROCEDURE — 85730 THROMBOPLASTIN TIME PARTIAL: CPT

## 2018-08-14 PROCEDURE — 85347 COAGULATION TIME ACTIVATED: CPT

## 2018-08-14 PROCEDURE — 83735 ASSAY OF MAGNESIUM: CPT

## 2018-08-14 PROCEDURE — 83735 ASSAY OF MAGNESIUM: CPT | Mod: 91

## 2018-08-14 PROCEDURE — 94010 BREATHING CAPACITY TEST: CPT

## 2018-08-14 PROCEDURE — 27000221 HC OXYGEN, UP TO 24 HOURS

## 2018-08-14 PROCEDURE — 94150 VITAL CAPACITY TEST: CPT

## 2018-08-14 PROCEDURE — 25000003 PHARM REV CODE 250: Performed by: STUDENT IN AN ORGANIZED HEALTH CARE EDUCATION/TRAINING PROGRAM

## 2018-08-14 PROCEDURE — 25000003 PHARM REV CODE 250

## 2018-08-14 PROCEDURE — A4216 STERILE WATER/SALINE, 10 ML: HCPCS | Performed by: STUDENT IN AN ORGANIZED HEALTH CARE EDUCATION/TRAINING PROGRAM

## 2018-08-14 PROCEDURE — 85025 COMPLETE CBC W/AUTO DIFF WBC: CPT

## 2018-08-14 RX ORDER — DEXMEDETOMIDINE HYDROCHLORIDE 4 UG/ML
0.2 INJECTION, SOLUTION INTRAVENOUS CONTINUOUS
Status: DISCONTINUED | OUTPATIENT
Start: 2018-08-14 | End: 2018-08-14

## 2018-08-14 RX ORDER — LIDOCAINE HYDROCHLORIDE 10 MG/ML
1 INJECTION, SOLUTION EPIDURAL; INFILTRATION; INTRACAUDAL; PERINEURAL ONCE
Status: DISCONTINUED | OUTPATIENT
Start: 2018-08-14 | End: 2018-08-15

## 2018-08-14 RX ORDER — HYDROMORPHONE HYDROCHLORIDE 1 MG/ML
0.5 INJECTION, SOLUTION INTRAMUSCULAR; INTRAVENOUS; SUBCUTANEOUS
Status: DISCONTINUED | OUTPATIENT
Start: 2018-08-14 | End: 2018-08-16

## 2018-08-14 RX ORDER — DEXMEDETOMIDINE HYDROCHLORIDE 4 UG/ML
INJECTION, SOLUTION INTRAVENOUS
Status: COMPLETED
Start: 2018-08-14 | End: 2018-08-14

## 2018-08-14 RX ORDER — ALBUMIN HUMAN 50 G/1000ML
25 SOLUTION INTRAVENOUS ONCE
Status: COMPLETED | OUTPATIENT
Start: 2018-08-14 | End: 2018-08-14

## 2018-08-14 RX ORDER — FUROSEMIDE 10 MG/ML
40 INJECTION INTRAMUSCULAR; INTRAVENOUS ONCE
Status: COMPLETED | OUTPATIENT
Start: 2018-08-14 | End: 2018-08-14

## 2018-08-14 RX ORDER — FUROSEMIDE 10 MG/ML
INJECTION INTRAMUSCULAR; INTRAVENOUS
Status: DISPENSED
Start: 2018-08-14 | End: 2018-08-15

## 2018-08-14 RX ORDER — EPINEPHRINE 1 MG/ML
INJECTION, SOLUTION INTRACARDIAC; INTRAMUSCULAR; INTRAVENOUS; SUBCUTANEOUS
Status: DISPENSED
Start: 2018-08-14 | End: 2018-08-14

## 2018-08-14 RX ORDER — LIDOCAINE HYDROCHLORIDE 10 MG/ML
INJECTION INFILTRATION; PERINEURAL
Status: COMPLETED
Start: 2018-08-14 | End: 2018-08-14

## 2018-08-14 RX ORDER — ALBUMIN HUMAN 50 G/1000ML
SOLUTION INTRAVENOUS
Status: DISPENSED
Start: 2018-08-14 | End: 2018-08-14

## 2018-08-14 RX ORDER — ALBUMIN HUMAN 50 G/1000ML
12.5 SOLUTION INTRAVENOUS ONCE
Status: COMPLETED | OUTPATIENT
Start: 2018-08-14 | End: 2018-08-14

## 2018-08-14 RX ORDER — ALBUMIN HUMAN 50 G/1000ML
SOLUTION INTRAVENOUS
Status: COMPLETED
Start: 2018-08-14 | End: 2018-08-14

## 2018-08-14 RX ORDER — IPRATROPIUM BROMIDE AND ALBUTEROL SULFATE 2.5; .5 MG/3ML; MG/3ML
3 SOLUTION RESPIRATORY (INHALATION) EVERY 4 HOURS
Status: COMPLETED | OUTPATIENT
Start: 2018-08-14 | End: 2018-08-15

## 2018-08-14 RX ADMIN — Medication 3 ML: at 10:08

## 2018-08-14 RX ADMIN — Medication 3 ML: at 05:08

## 2018-08-14 RX ADMIN — EPINEPHRINE 0.17 MCG/KG/MIN: 1 INJECTION INTRAMUSCULAR; INTRAVENOUS; SUBCUTANEOUS at 12:08

## 2018-08-14 RX ADMIN — HYDROMORPHONE HYDROCHLORIDE 0.5 MG: 1 INJECTION, SOLUTION INTRAMUSCULAR; INTRAVENOUS; SUBCUTANEOUS at 08:08

## 2018-08-14 RX ADMIN — MAGNESIUM SULFATE IN WATER 2 G: 40 INJECTION, SOLUTION INTRAVENOUS at 08:08

## 2018-08-14 RX ADMIN — PROPOFOL 35 MCG/KG/MIN: 10 INJECTION, EMULSION INTRAVENOUS at 01:08

## 2018-08-14 RX ADMIN — IPRATROPIUM BROMIDE AND ALBUTEROL SULFATE 3 ML: .5; 3 SOLUTION RESPIRATORY (INHALATION) at 11:08

## 2018-08-14 RX ADMIN — MUPIROCIN 1 G: 20 OINTMENT TOPICAL at 10:08

## 2018-08-14 RX ADMIN — EPINEPHRINE 0.08 MCG/KG/MIN: 1 INJECTION INTRAMUSCULAR; INTRAVENOUS; SUBCUTANEOUS at 06:08

## 2018-08-14 RX ADMIN — LIDOCAINE HYDROCHLORIDE 200 MG: 10 INJECTION, SOLUTION INFILTRATION; PERINEURAL at 03:08

## 2018-08-14 RX ADMIN — FENTANYL CITRATE 25 MCG: 50 INJECTION INTRAMUSCULAR; INTRAVENOUS at 05:08

## 2018-08-14 RX ADMIN — IPRATROPIUM BROMIDE AND ALBUTEROL SULFATE 3 ML: .5; 3 SOLUTION RESPIRATORY (INHALATION) at 07:08

## 2018-08-14 RX ADMIN — DEXMEDETOMIDINE HYDROCHLORIDE 1.6 MCG: 4 INJECTION, SOLUTION INTRAVENOUS at 08:08

## 2018-08-14 RX ADMIN — FUROSEMIDE 40 MG: 10 INJECTION, SOLUTION INTRAMUSCULAR; INTRAVENOUS at 11:08

## 2018-08-14 RX ADMIN — IPRATROPIUM BROMIDE AND ALBUTEROL SULFATE 3 ML: .5; 3 SOLUTION RESPIRATORY (INHALATION) at 05:08

## 2018-08-14 RX ADMIN — PANTOPRAZOLE SODIUM 40 MG: 40 INJECTION, POWDER, FOR SOLUTION INTRAVENOUS at 10:08

## 2018-08-14 RX ADMIN — MUPIROCIN 1 G: 20 OINTMENT TOPICAL at 09:08

## 2018-08-14 RX ADMIN — POTASSIUM CHLORIDE 20 MEQ: 200 INJECTION, SOLUTION INTRAVENOUS at 12:08

## 2018-08-14 RX ADMIN — ALBUMIN HUMAN 12.5 G: 0.05 INJECTION, SOLUTION INTRAVENOUS at 04:08

## 2018-08-14 RX ADMIN — IPRATROPIUM BROMIDE AND ALBUTEROL SULFATE 3 ML: .5; 3 SOLUTION RESPIRATORY (INHALATION) at 03:08

## 2018-08-14 RX ADMIN — ALBUMIN HUMAN 25 G: 0.05 INJECTION, SOLUTION INTRAVENOUS at 09:08

## 2018-08-14 RX ADMIN — Medication 3 ML: at 03:08

## 2018-08-14 RX ADMIN — CEFAZOLIN 2 G: 1 INJECTION, POWDER, FOR SOLUTION INTRAMUSCULAR; INTRAVENOUS at 11:08

## 2018-08-14 RX ADMIN — CEFAZOLIN 2 G: 1 INJECTION, POWDER, FOR SOLUTION INTRAMUSCULAR; INTRAVENOUS at 10:08

## 2018-08-14 RX ADMIN — POLYETHYLENE GLYCOL 3350 17 G: 17 POWDER, FOR SOLUTION ORAL at 10:08

## 2018-08-14 RX ADMIN — ALBUMIN HUMAN 25 G: 0.05 INJECTION, SOLUTION INTRAVENOUS at 07:08

## 2018-08-14 RX ADMIN — OXYCODONE HYDROCHLORIDE 5 MG: 5 TABLET ORAL at 03:08

## 2018-08-14 RX ADMIN — OXYCODONE HYDROCHLORIDE 10 MG: 5 TABLET ORAL at 07:08

## 2018-08-14 RX ADMIN — FENTANYL CITRATE 25 MCG: 50 INJECTION INTRAMUSCULAR; INTRAVENOUS at 03:08

## 2018-08-14 RX ADMIN — IPRATROPIUM BROMIDE AND ALBUTEROL SULFATE 3 ML: .5; 3 SOLUTION RESPIRATORY (INHALATION) at 08:08

## 2018-08-14 RX ADMIN — EPINEPHRINE 0.18 MCG/KG/MIN: 1 INJECTION INTRAMUSCULAR; INTRAVENOUS; SUBCUTANEOUS at 03:08

## 2018-08-14 RX ADMIN — ALBUMIN HUMAN 25 G: 50 SOLUTION INTRAVENOUS at 09:08

## 2018-08-14 RX ADMIN — CEFAZOLIN 2 G: 1 INJECTION, POWDER, FOR SOLUTION INTRAMUSCULAR; INTRAVENOUS at 04:08

## 2018-08-14 RX ADMIN — DOCUSATE SODIUM 100 MG: 100 CAPSULE, LIQUID FILLED ORAL at 09:08

## 2018-08-14 RX ADMIN — ASPIRIN 325 MG ORAL TABLET 325 MG: 325 PILL ORAL at 10:08

## 2018-08-14 NOTE — HPI
Reason for Consult: Management of Hyperglycemia     Surgical Procedure and Date: CABG on 8/13/18    HPI:   Patient is a 58 y.o. male with a diagnosis of CHF, CAD, and HTN. Recent heart cath on 6/28/18 with two vessel CAD involving primarly the proximal LAD and distal small non-dominant left circumflex per CTS note. Also with EF 30% on most recent echo. Patient is a smoker; 1 PPD. No personal history of DM. Endocrinology consulted for BG management.   Lab Results   Component Value Date    HGBA1C 5.9 (H) 08/09/2018

## 2018-08-14 NOTE — ASSESSMENT & PLAN NOTE
BG goal 110-140  CTS protocol      Continue IV insulin infusion and intensive q1 hr bg monitoring.    Discharge plans- TBD.

## 2018-08-14 NOTE — PROCEDURES
"Anjum Campbell is a 58 y.o. male patient.    Temp: 97.7 °F (36.5 °C) (08/14/18 1500)  Pulse: 77 (08/14/18 1706)  Resp: 16 (08/14/18 1706)  BP: (!) 119/59 (08/14/18 1600)  SpO2: 100 % (08/14/18 1707)  Weight: 86.7 kg (191 lb 2.2 oz) (08/14/18 0500)  Height: 5' 8" (172.7 cm) (08/13/18 1228)       Arterial Line  Date/Time: 8/14/2018 6:40 PM  Location procedure was performed: [facility] (SICU)  Performed by: Paulie Keene MD  Authorized by: Paulie Keene MD   Pre-op Diagnosis: Hypotension  Post-operative diagnosis: Hypotension  Consent Done: Yes  Consent: Verbal consent obtained.  Risks and benefits: risks, benefits and alternatives were discussed  Consent given by: patient  Patient understanding: patient states understanding of the procedure being performed  Time out: Immediately prior to procedure a "time out" was called to verify the correct patient, procedure, equipment, support staff and site/side marked as required.  Preparation: Patient was prepped and draped in the usual sterile fashion.  Indications: multiple ABGs and hemodynamic monitoring  Location: right radial  Anesthesia: local infiltration    Anesthesia:  Local Anesthetic: lidocaine 1% without epinephrine  Anesthetic total: 2 mL  Patient sedated: no  Jose's test normal: yes  Needle gauge: 20  Seldinger technique: Seldinger technique used  Number of attempts: 1  Complications: No  Estimated blood loss (mL): 1  Specimens: No  Implants: No  Post-procedure: line sutured and dressing applied  Post-procedure CMS: normal  Patient tolerance: Patient tolerated the procedure well with no immediate complications        Paulie Keene MD  [facility] PGY-II  Pager: [phone]    "

## 2018-08-14 NOTE — CONSULTS
[facility]  Endocrinology  Diabetes Consult Note    Consult Requested by: Orville Payne MD   Reason for admit: CAD (coronary artery disease)    HISTORY OF PRESENT ILLNESS:  Reason for Consult: Management of Hyperglycemia     Surgical Procedure and Date: CABG on 8/13/18    HPI:   Patient is a 58 y.o. male with a diagnosis of CHF, CAD, and HTN. Recent heart cath on 6/28/18 with two vessel CAD involving primarly the proximal LAD and distal small non-dominant left circumflex per CTS note. Also with EF 30% on most recent echo. Patient is a smoker; 1 PPD. No personal history of DM. Endocrinology consulted for BG management.   Lab Results   Component Value Date    HGBA1C 5.9 (H) 08/09/2018                 Interval HPI:   Overnight events: Admitted to ICU. Remains intubated on ventilator. Variable bg reading overnight; insulin infusion 2.6-8.1 u/hr.   Eating:   NPO  Nausea: No  Hypoglycemia and intervention: no, reading of 70 x2 followed by a finger stick of 119.   Fever: No  TPN and/or TF: No    PMH, PSH, FH, SH  reviewed     Review of Systems  Unable to obtain due to: Sedation,Intubation,Altered mental status,Critical illness,Reviewed ROS from note dated 8/9/18 per Dr. Payne.       Current Medications and/or Treatments Impacting Glycemic Control  Immunotherapy:    Immunosuppressants     None        Steroids:   Hormones (From admission, onward)    Start     Stop Route Frequency Ordered    08/13/18 2215  vasopressin (PITRESSIN) 0.2 Units/mL in dextrose 5 % 100 mL infusion      -- IV Continuous 08/13/18 2114        Pressors:    Autonomic Drugs (From admission, onward)    Start     Stop Route Frequency Ordered    08/13/18 2204  norepinephrine 4 mg in dextrose 5% 250 mL infusion (premix) (titrating)     Question Answer Comment   Titrate by: (in mcg/kg/min) 0.01    Titrate interval: (in minutes) 5    Titrate to maintain: (MAP or SBP) MAP    Greater than: (in mmHg) 80    Maximum dose: (in mcg/kg/min)  3        -- IV Continuous 08/13/18 2205 08/13/18 2115  EPINEPHrine (ADRENALIN) 4 mg in sodium chloride 0.9% 250 mL infusion     Question Answer Comment   Titrate by: (in mcg/kg/min) 0.02    Titrate interval: (in minutes) 5    Titrate to maintain: (SBP or MAP or Cardiac Index) MAP    Greater than: (in mmHg) 65    Cardiac index greater than: (in L/min) 2.2    Maximum dose: (in mcg/kg/min) 2        -- IV Continuous 08/13/18 2107        Hyperglycemia/Diabetes Medications:   Antihyperglycemics (From admission, onward)    Start     Stop Route Frequency Ordered    08/13/18 2115  insulin regular (Humulin R) 100 Units in sodium chloride 0.9% 100 mL infusion     Question:  Insulin rate changes (DO NOT MODIFY ANSWER)  Answer:  \\[facility]\epic\Images\Pharmacy\InsulinInfusions\CTS INSULIN [id number].pdf    -- IV Continuous 08/13/18 2107             PHYSICAL EXAMINATION:  Vitals:    08/14/18 0915   BP:    Pulse: 72   Resp:    Temp:      Body mass index is 29.06 kg/m².    Physical Exam   Constitutional:  Well developed, well nourished, NAD.  ENT: External ears no masses with nose patent; normal hearing.   Neck:  Supple; trachea midline.  Cardiovascular: Normal heart sounds, no LE edema.     Lungs: Intubated on ventilator.  Normal effort; lungs anterior bilaterally clear to auscultation.  Abdomen:  Soft, no masses,  no hernias. Hypoactive bowel sounds.   MS: No clubbing or cyanosis of nails noted; normal gait or unable to assess gait.  Skin: Mid-sternal incision with dressing; small amount of drainage noted. Chest tube x2.  No rashes, lesions, or ulcers; no nodules.  Psychiatric: VENANCIO  Neurological: VENANCIO        Labs Reviewed and Include   Recent Labs   Lab  08/14/18   0307  08/14/18   0820   GLU  192*   --    CALCIUM  9.3   --    NA  138   --    K  4.5  3.8   CO2  18*   --    CL  108   --    BUN  24*   --    CREATININE  1.3   --      Lab Results   Component Value Date    WBC 26.00 (H) 08/14/2018    HGB 13.5 (L) 08/14/2018    HCT  40.0 08/14/2018    MCV 91 08/14/2018     08/14/2018     No results for input(s): TSH, FREET4 in the last 168 hours.  Lab Results   Component Value Date    HGBA1C 5.9 (H) 08/09/2018       Nutritional status:   Body mass index is 29.06 kg/m².  Lab Results   Component Value Date    ALBUMIN 4.2 08/09/2018    ALBUMIN 4.1 06/20/2018     No results found for: PREALBUMIN    Estimated Creatinine Clearance: 67.9 mL/min (based on SCr of 1.3 mg/dL).    Accu-Checks  Recent Labs      08/14/18   0206  08/14/18   0307  08/14/18   0400  08/14/18   0459  08/14/18   0554  08/14/18   0821  08/14/18   0913  08/14/18   0914  08/14/18   0916  08/14/18   1023   POCTGLUCOSE  198*  193*  185*  179*  159*  119*  70  70  124*  98        ASSESSMENT and PLAN    * CAD (coronary artery disease)    S/p CABG.   Managed per CTS.           Stress hyperglycemia    BG goal 110-140  CTS protocol      Continue IV insulin infusion and intensive q1 hr bg monitoring.    Discharge plans- TBD.           S/P CABG (coronary artery bypass graft)    Managed per primary.   Optimize bg control.               Plan discussed with RNs at bedside.     Constanza Arreaga NP  Endocrinology  [facility]

## 2018-08-14 NOTE — ASSESSMENT & PLAN NOTE
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 POD 1.    Plan:    Neuro:   -Intubated  -Sedation: Propofol gtt discontinued, starting precedex.  -Pain control: PRN acetaminophen, oxycodone, fentanyl  -Daily sedation vacations    Pulmonary:   -Intubated  -Vent settings  Vent Mode: SIMV  Oxygen Concentration (%):  [] 41  Resp Rate Total:  [16 br/min-30 br/min] 21 br/min  Vt Set:  [500 mL] 500 mL  PEEP/CPAP:  [5 cmH20] 5 cmH20  Pressure Support:  [10 cmH20] 10 cmH20  Mean Airway Pressure:  [7.7 cmH20-10 cmH20] 8.1 cmH20  -Daily SBT    Cardiac:  -S/p CABG x1 for which LAD was only bypassable lesion  -MAP goal  to increase coronary perfusion  -Drips: Epi increased to 0.25 from 0.1 mcg/kg/min, Levo decreased to 0.01 from 0.02 mcg/kg/min, Vasopressin  0.08 mcg/kg/min, Nitroglycerin 0.0002 mcg/kg/min  - mg qd    Renal:   -Wheeler in place  -UOP good post-op but slight drop this morning with good response to 250 cc albumin  -BUN 24/1.3 from 19/1.1 post-op    Fluids/Electrolytes/Nutrition/GI:   -Nutritional status: NPO  -replace lytes PRN  -Fluids: D5 1/2 NS w/ KCl 20 mEq at 5 cc/hr  -Bowel regimen  -Prophylaxis: Pantoprazole    Hematology/Oncology:  -H/H 13.5/40.0 stable from post-op  -INR/Plts 1.0/260 stable from post-op    Infectious Disease:   -Afebrile  -WBC 26.0 from 31.92 post-op  -Abx: Ancef surgical ppx  -Continue to monitor    Endocrine:  -Glucose goal per primary  -Insulin gtt    Prophylaxis:  -Pantoprazole    Dispo:  -Continue care in the ICU setting  -Primary: CTS

## 2018-08-14 NOTE — TRANSFER OF CARE
"Anesthesia Transfer of Care Note    Patient: Anjum Campbell    Procedure(s) Performed: Procedure(s) (LRB):  CABGx1 (N/A)  EXCISION, MASS, MEDIASTINUM    Patient location: ICU    Anesthesia Type: general    Transport from OR: Transported from OR intubated on 100% O2 by AMBU with assisted ventilation. Continuous ECG monitoring in transport. Continuous SpO2 monitoring in transport. Continuos invasive BP monitoring in transport    Post pain: adequate analgesia    Post assessment: no apparent anesthetic complications    Post vital signs: stable    Level of consciousness: sedated    Nausea/Vomiting: no nausea/vomiting    Complications: none    Transfer of care protocol was followed      Last vitals:   Visit Vitals  /65 (BP Location: Right arm, Patient Position: Lying)   Pulse 62   Temp 36.8 °C (98.3 °F) (Oral)   Resp 18   Ht 5' 8" (1.727 m)   Wt 80.3 kg (177 lb)   SpO2 100%   BMI 26.91 kg/m²     "

## 2018-08-14 NOTE — SUBJECTIVE & OBJECTIVE
Follow-up For: Procedure(s) (LRB):  CABGx1 (N/A)  EXCISION, MASS, MEDIASTINUM    Post-Operative Day: Day of Surgery     Past Medical History:   Diagnosis Date    Back injury 1994    Chest pain     CHF (congestive heart failure)     Hypertension        Past Surgical History:   Procedure Laterality Date    back fusion   1995    Lumber surgery  1995       Review of patient's allergies indicates:  No Known Allergies    Family History     Problem Relation (Age of Onset)    Cancer Father        Tobacco Use    Smoking status: Current Every Day Smoker     Packs/day: 1.00     Years: 30.00     Pack years: 30.00     Types: Cigarettes    Smokeless tobacco: Never Used   Substance and Sexual Activity    Alcohol use: Yes     Comment: occaSIONAL    Drug use: No    Sexual activity: Not on file      Review of Systems     Objective:     Vital Signs (Most Recent):  Temp: 98.6 °F (37 °C) (08/13/18 2115)  Pulse: 79 (08/13/18 2111)  Resp: (!) 120 (08/13/18 2111)  BP: 108/65 (08/13/18 1440)  SpO2: 100 % (08/13/18 2111) Vital Signs (24h Range):  Temp:  [98.3 °F (36.8 °C)-98.6 °F (37 °C)] 98.6 °F (37 °C)  Pulse:  [62-79] 79  Resp:  [] 120  SpO2:  [96 %-100 %] 100 %  BP: ()/(51-65) 108/65     Weight: 80.3 kg (177 lb)  Body mass index is 26.91 kg/m².      Intake/Output Summary (Last 24 hours) at 8/13/2018 2147  Last data filed at 8/13/2018 2125  Gross per 24 hour   Intake 4000 ml   Output 1029 ml   Net 2971 ml       Physical Exam   Constitutional: He appears well-developed and well-nourished. No distress.   HENT:   Head: Normocephalic and atraumatic.   Cardiovascular: An irregular rhythm present. Exam reveals friction rub. Exam reveals no gallop.   No murmur heard.  Pulmonary/Chest: Effort normal and breath sounds normal. No stridor. No respiratory distress. He has no wheezes. He has no rales.   Abdominal: Soft. Bowel sounds are normal.   Genitourinary:   Genitourinary Comments: Right and left mediastinal chest tubes in  place draining sanguinous fluid, Anterior pleural chest tube in place draining sanguinous fluid       Vents:  Vent Mode: SIMV (08/13/18 2111)  Ventilator Initiated: Yes (08/13/18 2111)  Set Rate: 16 bmp (08/13/18 2111)  Vt Set: 500 mL (08/13/18 2111)  Pressure Support: 10 cmH20 (08/13/18 2111)  PEEP/CPAP: 5 cmH20 (08/13/18 2111)  Oxygen Concentration (%): 100 (08/13/18 2111)  Peak Airway Pressure: 28 cmH2O (08/13/18 2111)  Plateau Pressure: 0 cmH20 (08/13/18 2111)  Total Ve: 8.8 mL (08/13/18 2111)  F/VT Ratio<105 (RSBI): 217.79 (08/13/18 2111)    Lines/Drains/Airways     Central Venous Catheter Line                 Percutaneous Central Line Insertion/Assessment - Quad lumen  08/13/18 1331 right internal jugular less than 1 day         Percutaneous Central Line Insertion/Assessment - quad lumen  08/13/18 1331 right internal jugular;other (see comments) less than 1 day          Drain                 Chest Tube 08/13/18 1936 1 Anterior Pleural 19 Fr. less than 1 day         Urethral Catheter 08/13/18 1513 Temperature probe 14 Fr. less than 1 day         Y Chest Tube 1 and 2 08/13/18 1927 1 Right Mediastinal 19 Fr. 2 Left Mediastinal 19 Fr. less than 1 day          Airway                 Airway - Non-Surgical 08/13/18 1510 less than 1 day          Epidural Line                 Perineural Analgesia/Anesthesia Assessment (using dermatomes) Epidural 08/13/18 1520 less than 1 day          Arterial Line                 Arterial Line 08/13/18 1316 Left Radial less than 1 day          Line                 Pacer Wires 08/13/18 1900 less than 1 day          Peripheral Intravenous Line                 Peripheral IV - Single Lumen 08/13/18 1230 Left Forearm less than 1 day                Significant Labs:    CBC/Anemia Profile:  Recent Labs   Lab  08/13/18   1821  08/13/18   1846  08/13/18 2114   HCT  28*  28*  38        Chemistries:  No results for input(s): NA, K, CL, CO2, BUN, CREATININE, CALCIUM, ALBUMIN, PROT, BILITOT,  ALKPHOS, ALT, AST, GLUCOSE, MG, PHOS in the last 48 hours.    All pertinent labs within the past 24 hours have been reviewed.    Significant Imaging: I have reviewed all pertinent imaging results/findings within the past 24 hours.

## 2018-08-14 NOTE — SUBJECTIVE & OBJECTIVE
Interval HPI:   Overnight events: Admitted to ICU. Remains intubated on ventilator. Variable bg reading overnight; insulin infusion 2.6-8.1 u/hr.   Eating:   NPO  Nausea: No  Hypoglycemia and intervention: no, reading of 70 x2 followed by a finger stick of 119.   Fever: No  TPN and/or TF: No    PMH, PSH, FH, SH  reviewed     Review of Systems  Unable to obtain due to: Sedation,Intubation,Altered mental status,Critical illness,Reviewed ROS from note dated 8/9/18 per Dr. Payne.       Current Medications and/or Treatments Impacting Glycemic Control  Immunotherapy:    Immunosuppressants     None        Steroids:   Hormones (From admission, onward)    Start     Stop Route Frequency Ordered    08/13/18 2215  vasopressin (PITRESSIN) 0.2 Units/mL in dextrose 5 % 100 mL infusion      -- IV Continuous 08/13/18 2114        Pressors:    Autonomic Drugs (From admission, onward)    Start     Stop Route Frequency Ordered    08/13/18 2204  norepinephrine 4 mg in dextrose 5% 250 mL infusion (premix) (titrating)     Question Answer Comment   Titrate by: (in mcg/kg/min) 0.01    Titrate interval: (in minutes) 5    Titrate to maintain: (MAP or SBP) MAP    Greater than: (in mmHg) 80    Maximum dose: (in mcg/kg/min) 3        -- IV Continuous 08/13/18 2205 08/13/18 2115  EPINEPHrine (ADRENALIN) 4 mg in sodium chloride 0.9% 250 mL infusion     Question Answer Comment   Titrate by: (in mcg/kg/min) 0.02    Titrate interval: (in minutes) 5    Titrate to maintain: (SBP or MAP or Cardiac Index) MAP    Greater than: (in mmHg) 65    Cardiac index greater than: (in L/min) 2.2    Maximum dose: (in mcg/kg/min) 2        -- IV Continuous 08/13/18 2107        Hyperglycemia/Diabetes Medications:   Antihyperglycemics (From admission, onward)    Start     Stop Route Frequency Ordered    08/13/18 2115  insulin regular (Humulin R) 100 Units in sodium chloride 0.9% 100 mL infusion     Question:  Insulin rate changes (DO NOT MODIFY ANSWER)  Answer:   \\[email or web address] [email or web address] [id number]    -- IV Continuous 08/13/18 2107             PHYSICAL EXAMINATION:  Vitals:    08/14/18 0915   BP:    Pulse: 72   Resp:    Temp:      Body mass index is 29.06 kg/m².    Physical Exam   Constitutional:  Well developed, well nourished, NAD.  ENT: External ears no masses with nose patent; normal hearing.   Neck:  Supple; trachea midline.  Cardiovascular: Normal heart sounds, no LE edema.     Lungs: Intubated on ventilator.  Normal effort; lungs anterior bilaterally clear to auscultation.  Abdomen:  Soft, no masses,  no hernias. Hypoactive bowel sounds.   MS: No clubbing or cyanosis of nails noted; normal gait or unable to assess gait.  Skin: Mid-sternal incision with dressing; small amount of drainage noted. Chest tube x2.  No rashes, lesions, or ulcers; no nodules.  Psychiatric: VENANCIO  Neurological: VENANCIO

## 2018-08-14 NOTE — PROGRESS NOTES
Notified Dr. Gilliland of most recent vitals, assessment, and pertinent labs. Also notified of increasing pressor requirements, CVP flat 6. Orders to give 250 mL albumin bolus. Will carry out and continue to monitor.

## 2018-08-14 NOTE — PT/OT/SLP PROGRESS
Occupational Therapy      Patient Name:  Anjum Campbell   MRN:  [id number]    Patient not seen today secondary to intubated in am; extubated at 1255. OT attempted at 1450, however on hold per RN d/t pain not yet controlled  . Will follow-up 8/15/18.    SAL Carreon  8/14/2018

## 2018-08-14 NOTE — PROGRESS NOTES
Pt arrived from OR to SICU room 78653. Pt connected to ICU cardiac monitoring and ventilator. VSS at this time. Dr. Gilliland and Dr. Calero at bedside.     Skin note: Sacral foam dsg noted. No skin breakdown noted under foam dressing, foam dressing reapplied. No apparent skin breakdown noted to back of head, elbows or heels. Arms elevated with pillows and heel protectors applied.

## 2018-08-14 NOTE — SUBJECTIVE & OBJECTIVE
Interval History:   Arrived to SICU last night intubated, sedated. Overnight, required epi 0.25, levo 0.1, vaso 0.08. 250ml Albumin given this morning for low CVP, UOP  Remains on nitro gtt   Medications:  Continuous Infusions:   dexmedetomidine (PRECEDEX) infusion 1.6 mcg (08/14/18 0815)    dextrose 5 % and 0.45 % NaCl with KCl 20 mEq 5 mL/hr at 08/14/18 0600    epinephrine infusion 0.25 mcg/kg/min (08/14/18 0600)    insulin (HUMAN R) infusion (adults) 8.1 Units/hr (08/14/18 0600)    nitroGLYCERIN 0.0002 mg/kg/min (08/14/18 0600)    norepinephrine bitartrate-D5W 0.1 mcg/kg/min (08/14/18 0600)    propofol 30 mcg/kg/min (08/14/18 0600)    vasopressin (PITRESSIN) infusion 0.08 Units/min (08/14/18 0600)     Scheduled Meds:   albumin human 5%        albuterol-ipratropium  3 mL Nebulization Q4H    aspirin  325 mg Oral Daily    ceFAZolin (ANCEF) IVPB  2 g Intravenous Q8H    docusate sodium  100 mg Oral BID    mupirocin  1 g Nasal BID    pantoprazole 40 mg in dextrose 5 % 100 mL IVPB (over 15 minutes) (ready to mix system)  40 mg Intravenous Daily    polyethylene glycol  17 g Oral Daily    sodium chloride 0.9%  3 mL Intravenous Q8H     PRN Meds:acetaminophen, albuterol-ipratropium, bisacodyl, dextrose 50%, dextrose 50%, fentaNYL, fentaNYL **FOLLOWED BY** [START ON 8/21/2018] fentaNYL, magnesium sulfate IVPB, magnesium sulfate IVPB, metoclopramide HCl, ondansetron, oxyCODONE, oxyCODONE, potassium chloride in water **AND** potassium chloride in water **AND** potassium chloride in water, sodium phosphate IVPB, sodium phosphate IVPB, sodium phosphate IVPB     Objective:     Vital Signs (Most Recent):  Temp: 98.1 °F (36.7 °C) (08/14/18 0315)  Pulse: 72 (08/14/18 0915)  Resp: (!) 31 (08/14/18 0738)  BP: 133/63 (08/14/18 0600)  SpO2: 98 % (08/14/18 0915) Vital Signs (24h Range):  Temp:  [98.1 °F (36.7 °C)-98.6 °F (37 °C)] 98.1 °F (36.7 °C)  Pulse:  [] 72  Resp:  [] 31  SpO2:  [96 %-100 %] 98 %  BP:  ()/(51-78) 133/63  Arterial Line BP: ()/(55-87) 134/58     Weight: 86.7 kg (191 lb 2.2 oz)  Body mass index is 29.06 kg/m².    SpO2: 98 %  O2 Device (Oxygen Therapy): ventilator    Intake/Output - Last 3 Shifts     ** Patient Encounter Information Not Found **          Lines/Drains/Airways     Central Venous Catheter Line                 Percutaneous Central Line Insertion/Assessment - Quad lumen  08/13/18 1331 right internal jugular less than 1 day         Percutaneous Central Line Insertion/Assessment - quad lumen  08/13/18 1331 right internal jugular;other (see comments) less than 1 day          Drain                 Chest Tube 08/13/18 1936 1 Anterior Pleural 19 Fr. less than 1 day         NG/OG Tube 08/13/18 2130 Right mouth less than 1 day         Urethral Catheter 08/13/18 1513 Temperature probe 14 Fr. less than 1 day         Y Chest Tube 1 and 2 08/13/18 1927 1 Right Mediastinal 19 Fr. 2 Left Mediastinal 19 Fr. less than 1 day          Airway                 Airway - Non-Surgical 08/13/18 1510 less than 1 day          Epidural Line                 Perineural Analgesia/Anesthesia Assessment (using dermatomes) Epidural 08/13/18 1520 less than 1 day          Arterial Line                 Arterial Line 08/13/18 1316 Left Radial less than 1 day          Line                 Pacer Wires 08/13/18 1900 less than 1 day          Peripheral Intravenous Line                 Peripheral IV - Single Lumen 08/13/18 1230 Left Forearm less than 1 day                Physical Exam   Constitutional: He appears well-developed and well-nourished. No distress.   HENT:   Head: Normocephalic and atraumatic.   Eyes: EOM are normal.   Neck: Neck supple.   Cardiovascular: Normal rate and regular rhythm. Exam reveals no gallop and no friction rub.   No murmur heard.  Sternal incision c/d/i. CTs with SS output   Pulmonary/Chest: Effort normal and breath sounds normal. No stridor. No respiratory distress. He has no wheezes. He  has no rales.   Abdominal: Soft. Bowel sounds are normal.   Genitourinary:   Genitourinary Comments: Right and left mediastinal chest tubes in place draining sanguinous fluid, Anterior pleural chest tube in place draining sanguinous fluid   Neurological:   Sedated   Skin: Skin is warm and dry.   Psychiatric:   Sedated   Nursing note and vitals reviewed.      Significant Labs:  ABGs:   Recent Labs   Lab  08/14/18   0502   PH  7.307*   PCO2  39.0   PO2  97   HCO3  19.5*   POCSATURATED  97   BE  -7     CBC:   Recent Labs   Lab  08/14/18 0307   WBC  26.00*   RBC  4.39*   HGB  13.5*   HCT  40.0   PLT  260   MCV  91   MCH  30.8   MCHC  33.8     CMP:   Recent Labs   Lab  08/14/18 0307 08/14/18   0820   GLU  192*   --    CALCIUM  9.3   --    NA  138   --    K  4.5  3.8   CO2  18*   --    CL  108   --    BUN  24*   --    CREATININE  1.3   --      Coagulation:   Recent Labs   Lab  08/14/18 0307   INR  1.0   APTT  <21.0     LFTs: No results for input(s): ALT, AST, ALKPHOS, BILITOT, PROT, ALBUMIN in the last 48 hours.    Significant Diagnostics:  I have reviewed all pertinent imaging results/findings within the past 24 hours.

## 2018-08-14 NOTE — PLAN OF CARE
Problem: Patient Care Overview  Goal: Plan of Care Review  Outcome: Ongoing (interventions implemented as appropriate)  Pt admitted to SICU from CABG x1. Pt unable to follow commands on sedation vacation but moves all 4 extremities, able to open eyes. Vent settings: SIMV rate 16, FiO2 40%, PEEP 5, SpO2 100%. OGT to LIWS. Gtts: Epi @ 0.2 mcg/kg/min, Levo @ 0.08 mcg/kg/min, Vaso @ 0.08 units/min to maintain MAP  per Dr. Payne. 250 mL albumin bolus given for hypotension/low UO, CVP 6-7. Nitro gtt @ 0.2 mcg/kg/min or 4.8 mL/hr. Insulin gtt in place, accucheck q1h. Propofol for sedation/comfort. Chest tube output appropriate, see flowsheets for details. Dr. Gilliland aware of most recent vitals, assessment, and labs this AM. Turned q2h to prevent skin breakdown. Will continue to monitor.

## 2018-08-14 NOTE — PROGRESS NOTES
[facility]  Cardiothoracic Surgery  Progress Note    Patient Name: Anjum Campbell  MRN: [id number]  Admission Date: 8/13/2018  Hospital Length of Stay: 1 days  Code Status: Full Code   Attending Physician: Orville Payne MD   Referring Provider: Orville Payne MD  Principal Problem:CAD (coronary artery disease)    Subjective:     Post-Op Info:  Procedure(s) (LRB):  CABGx1 (N/A)  EXCISION, MASS, MEDIASTINUM   1 Day Post-Op     Interval History:   Arrived to SICU last night intubated, sedated. Overnight, required epi 0.25, levo 0.1, vaso 0.08. 250ml Albumin given this morning for low CVP, UOP  Remains on nitro gtt   Medications:  Continuous Infusions:   dexmedetomidine (PRECEDEX) infusion 1.6 mcg (08/14/18 0815)    dextrose 5 % and 0.45 % NaCl with KCl 20 mEq 5 mL/hr at 08/14/18 0600    epinephrine infusion 0.25 mcg/kg/min (08/14/18 0600)    insulin (HUMAN R) infusion (adults) 8.1 Units/hr (08/14/18 0600)    nitroGLYCERIN 0.0002 mg/kg/min (08/14/18 0600)    norepinephrine bitartrate-D5W 0.1 mcg/kg/min (08/14/18 0600)    propofol 30 mcg/kg/min (08/14/18 0600)    vasopressin (PITRESSIN) infusion 0.08 Units/min (08/14/18 0600)     Scheduled Meds:   albumin human 5%        albuterol-ipratropium  3 mL Nebulization Q4H    aspirin  325 mg Oral Daily    ceFAZolin (ANCEF) IVPB  2 g Intravenous Q8H    docusate sodium  100 mg Oral BID    mupirocin  1 g Nasal BID    pantoprazole 40 mg in dextrose 5 % 100 mL IVPB (over 15 minutes) (ready to mix system)  40 mg Intravenous Daily    polyethylene glycol  17 g Oral Daily    sodium chloride 0.9%  3 mL Intravenous Q8H     PRN Meds:acetaminophen, albuterol-ipratropium, bisacodyl, dextrose 50%, dextrose 50%, fentaNYL, fentaNYL **FOLLOWED BY** [START ON 8/21/2018] fentaNYL, magnesium sulfate IVPB, magnesium sulfate IVPB, metoclopramide HCl, ondansetron, oxyCODONE, oxyCODONE, potassium chloride in water **AND** potassium chloride in water  **AND** potassium chloride in water, sodium phosphate IVPB, sodium phosphate IVPB, sodium phosphate IVPB     Objective:     Vital Signs (Most Recent):  Temp: 98.1 °F (36.7 °C) (08/14/18 0315)  Pulse: 72 (08/14/18 0915)  Resp: (!) 31 (08/14/18 0738)  BP: 133/63 (08/14/18 0600)  SpO2: 98 % (08/14/18 0915) Vital Signs (24h Range):  Temp:  [98.1 °F (36.7 °C)-98.6 °F (37 °C)] 98.1 °F (36.7 °C)  Pulse:  [] 72  Resp:  [] 31  SpO2:  [96 %-100 %] 98 %  BP: ()/(51-78) 133/63  Arterial Line BP: ()/(55-87) 134/58     Weight: 86.7 kg (191 lb 2.2 oz)  Body mass index is 29.06 kg/m².    SpO2: 98 %  O2 Device (Oxygen Therapy): ventilator    Intake/Output - Last 3 Shifts     ** Patient Encounter Information Not Found **          Lines/Drains/Airways     Central Venous Catheter Line                 Percutaneous Central Line Insertion/Assessment - Quad lumen  08/13/18 1331 right internal jugular less than 1 day         Percutaneous Central Line Insertion/Assessment - quad lumen  08/13/18 1331 right internal jugular;other (see comments) less than 1 day          Drain                 Chest Tube 08/13/18 1936 1 Anterior Pleural 19 Fr. less than 1 day         NG/OG Tube 08/13/18 2130 Right mouth less than 1 day         Urethral Catheter 08/13/18 1513 Temperature probe 14 Fr. less than 1 day         Y Chest Tube 1 and 2 08/13/18 1927 1 Right Mediastinal 19 Fr. 2 Left Mediastinal 19 Fr. less than 1 day          Airway                 Airway - Non-Surgical 08/13/18 1510 less than 1 day          Epidural Line                 Perineural Analgesia/Anesthesia Assessment (using dermatomes) Epidural 08/13/18 1520 less than 1 day          Arterial Line                 Arterial Line 08/13/18 1316 Left Radial less than 1 day          Line                 Pacer Wires 08/13/18 1900 less than 1 day          Peripheral Intravenous Line                 Peripheral IV - Single Lumen 08/13/18 1230 Left Forearm less than 1 day                 Physical Exam   Constitutional: He appears well-developed and well-nourished. No distress.   HENT:   Head: Normocephalic and atraumatic.   Eyes: EOM are normal.   Neck: Neck supple.   Cardiovascular: Normal rate and regular rhythm. Exam reveals no gallop and no friction rub.   No murmur heard.  Sternal incision c/d/i. CTs with SS output   Pulmonary/Chest: Effort normal and breath sounds normal. No stridor. No respiratory distress. He has no wheezes. He has no rales.   Abdominal: Soft. Bowel sounds are normal.   Genitourinary:   Genitourinary Comments: Right and left mediastinal chest tubes in place draining sanguinous fluid, Anterior pleural chest tube in place draining sanguinous fluid   Neurological:   Sedated   Skin: Skin is warm and dry.   Psychiatric:   Sedated   Nursing note and vitals reviewed.      Significant Labs:  ABGs:   Recent Labs   Lab  08/14/18   0502   PH  7.307*   PCO2  39.0   PO2  97   HCO3  19.5*   POCSATURATED  97   BE  -7     CBC:   Recent Labs   Lab  08/14/18   0307   WBC  26.00*   RBC  4.39*   HGB  13.5*   HCT  40.0   PLT  260   MCV  91   MCH  30.8   MCHC  33.8     CMP:   Recent Labs   Lab  08/14/18   0307  08/14/18   0820   GLU  192*   --    CALCIUM  9.3   --    NA  138   --    K  4.5  3.8   CO2  18*   --    CL  108   --    BUN  24*   --    CREATININE  1.3   --      Coagulation:   Recent Labs   Lab  08/14/18   0307   INR  1.0   APTT  <21.0     LFTs: No results for input(s): ALT, AST, ALKPHOS, BILITOT, PROT, ALBUMIN in the last 48 hours.    Significant Diagnostics:  I have reviewed all pertinent imaging results/findings within the past 24 hours.    Assessment/Plan:     * CAD (coronary artery disease)    58 year old male s/p CABG (LIMA to LAD) and resection of mediastinal mass 8/13/18    Neuro:   - Sedation: Propofol. Plan to switch to Precedex   - Pain control: Fentanyl PRN      Pulmonary:   - Intubated  Vent Mode: SIMV  Oxygen Concentration (%):  [] 41  Resp Rate Total:  [16  br/min-30 br/min] 17 br/min  Vt Set:  [500 mL] 500 mL  PEEP/CPAP:  [5 cmH20] 5 cmH20  Pressure Support:  [10 cmH20] 10 cmH20  Mean Airway Pressure:  [7.7 cmH20-10 cmH20] 8.7 cmH20  - Wean to extubate  - Daily CXRs  - CTs to suction   - Duonebs PRN     Cardiac:  - Drips: epi, levo, vaso, nitro  - MAP goal >70mmHg   - Wean levo off      Renal:   - BUN/CR 24/1.3  - Keep fierro  - Trend BMP     Infectious Disease:   - Marcelle-op ancef  - Trend WBC     Hematology/Oncology:  - H/H stable  - Coags normal   - Trend CBC     Endocrine:  - Insulin gtt     Fluids/Electrolytes/Nutrition/GI:   - NPO  - ADAT once extubated  - Trend CMP  - Replace Lytes PRN      Dispo:  Continue SICU care. Wean inotropes and wean to extubate  Primary team: ARSH Keene MD  Cardiothoracic Surgery  [facility]

## 2018-08-14 NOTE — OP NOTE
DATE OF PROCEDURE:  08/13/2018    ATTENDING SURGEON:  Orville Payne M.D.    ASSISTANT:  Alfredito Guy, Cardiothoracic resident.    PREOPERATIVE DIAGNOSES:  1.  Coronary artery disease.  2.  Hypertension.  3.  Active smoking.  4.  History of congestive heart failure.    POSTOPERATIVE DIAGNOSES:  1.  Coronary artery disease.  2.  Hypertension.  3.  Active smoking.  4.  History of congestive heart failure.    OPERATIONS PERFORMED:  1.  Coronary artery bypass grafting x1, with left internal mammary artery to   left anterior descending.  2.  Excision of mediastinal mass.    ANESTHESIA:  General endotracheal.    ESTIMATED BLOOD LOSS:  100 mL.    BRIEF HISTORY:  Mr. Campbell is a 58-year-old gentleman who initially presented   with substernal chest pain.  His pain occurs frequently and is responsive to   nitroglycerin.  He underwent a coronary angiogram, which demonstrated an ostial   LAD lesion.  A nuclear stress was negative, as well as a PET stress.  However,   the patient presents with classic anginal symptoms, which are responsive to   nitroglycerin.  He and I discussed his clinical situation, and he elected to   proceed with surgical revascularization.    PROCEDURE IN DETAIL:  After obtaining informed and written consent, the patient   was brought to the Operating Room and placed on the operating room table in   supine position.  After induction of adequate general endotracheal anesthesia,   the patient's chest, abdomen, pelvis and bilateral lower extremities were   prepped and draped in the usual sterile fashion.  An upper midline skin incision   was made, and a median sternotomy was performed.  The left internal mammary   artery was completely dissected, but not divided.  We then began the process of   exposing the anterior pericardium.  In the course of doing so, we encountered a   mass in the upper anterior mediastinum.  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  It did not appear  to be invading the surrounding   tissues, but it extended inferiorly to beyond the innominate vein.  Obviously,   this was in our surgical field and postoperatively would have been encased in   adhesions.  I felt that the best option was to resect this mass.  This was done   so, and was passed off the field for pathologic analysis.  We then completed the   creation of the pericardial well.  Laparotomy pads were placed behind the left   ventricle to deliver the LAD to the midline.  This resulted in hypotension.  The   patient after being placed under anesthesia had required some pharmacologic   support for blood pressure.  I felt that this instability that arose immediately   after positioning the heart and persisted was likely to be a problem if we   attempted the operation without the utilization of the heart-lung machine.  As a   result, I felt the best course of action was to place the patient on bypass.    He had already been systemically heparinized.  Cannulation sutures were placed   in the aorta and in the right atrial appendage.  The aortic cannula was   inserted, followed by the venous.  An antegrade cardioplegia catheter was   placed.  The mammary was divided distally, and its end was prepared.  The   patient was then put on cardiopulmonary bypass.  Once on bypass, the aortic   crossclamp was applied, and the heart was arrested using cold blood enhanced   antegrade cardioplegia.  A prompt electromechanical arrest was achieved.  Once   the cardioplegia was all in, we were able to position the heart properly.  A   site suitable for grafting in the mid LAD was identified, and an arteriotomy was   made.  The mammary was brought up, and the anastomosis was performed using a   running 7-0 Prolene suture.  After completion of the anastomosis, it was tested.    It was hemostatic.  The mammary pedicle was tacked to the heart using two 5-0   Prolene sutures.  De-airing maneuvers were performed, and the aortic  cross-clamp   was removed.  The patient was allowed to reperfuse, and was subsequently weaned   from cardiopulmonary bypass.  Separation from bypass was somewhat slow, but   eventually the patient did separate easily from bypass.  Once off bypass, all   surgical sites were inspected. There was good hemostasis.  The test dose of   protamine was administered, and this was well tolerated.  The total dose was   then given.  [place] through the total dose, all cannulas were removed.  All   surgical sites were again inspected, and again there was good hemostasis.    Ventricular pacing wires were placed.  Drains were placed.  After again   confirming adequate hemostasis, the patient's chest was closed using #6   stainless steel wires to reapproximate the sternum.  The overlying soft tissues   were reapproximated using absorbable suture material.  The patient's chest was   washed and dried, and a dry dressing was applied.  The patient tolerated the   procedure well, there were no complications.  At the conclusion of the case,   sponge and instrument counts were correct.      COURTNEY  dd: 08/13/2018 20:02:18 (CDT)  td: 08/13/2018 21:30:03 (CDT)  Doc ID   #[id number]  Job ID #[id number]    CC:

## 2018-08-14 NOTE — ASSESSMENT & PLAN NOTE
58 year old male s/p CABG (LIMA to LAD) and resection of mediastinal mass 8/13/18    Neuro:   - Sedation: Propofol. Plan to switch to Precedex   - Pain control: Fentanyl PRN      Pulmonary:   - Intubated  Vent Mode: SIMV  Oxygen Concentration (%):  [] 41  Resp Rate Total:  [16 br/min-30 br/min] 17 br/min  Vt Set:  [500 mL] 500 mL  PEEP/CPAP:  [5 cmH20] 5 cmH20  Pressure Support:  [10 cmH20] 10 cmH20  Mean Airway Pressure:  [7.7 cmH20-10 cmH20] 8.7 cmH20  - Wean to extubate  - Daily CXRs  - CTs to suction   - Duonebs PRN     Cardiac:  - Drips: epi, levo, vaso, nitro  - MAP goal >70mmHg   - Wean levo off      Renal:   - BUN/CR 24/1.3  - Keep fierro  - Trend BMP     Infectious Disease:   - Marcelle-op ancef  - Trend WBC     Hematology/Oncology:  - H/H stable  - Coags normal   - Trend CBC     Endocrine:  - Insulin gtt     Fluids/Electrolytes/Nutrition/GI:   - NPO  - ADAT once extubated  - Trend CMP  - Replace Lytes PRN      Dispo:  Continue SICU care. Wean inotropes and wean to extubate  Primary team: CTS

## 2018-08-14 NOTE — PROGRESS NOTES
Per Paulie Keene extubate patient. Patient extubated at 1255 no adverse effects, placed on NC 2L. Will continue to monitor.

## 2018-08-14 NOTE — PT/OT/SLP PROGRESS
Physical Therapy      Patient Name:  Anjum Campbell   MRN:  [id number]    Patient not seen today secondary to Other (Comment). In AM and PM pt was on hold. In AM pt was intubated and sedated. Pt was extubated late AM but then on hold per nursing in PM. Will follow-up tomorrow for PT evaluation.    Justyna Hutchins, PT   8/14/2018

## 2018-08-14 NOTE — PROGRESS NOTES
[facility]  Critical Care - Surgery  Progress Note    Patient Name: Anjum Campbell  MRN: [id number]  Admission Date: 8/13/2018  Hospital Length of Stay: 1 days  Code Status: Full Code  Attending Provider: Orville Payne MD  Primary Care Provider: Enmanuel Doe MD   Principal Problem: CAD (coronary artery disease)    Subjective:     Hospital/ICU Course:  No notes on file    Interval History/Significant Events: Afebrile, has gone up on epi and down on levo overnight. Good urine output post-op and this morning following 250 cc albumin bolus. WBC trending down.    Follow-up For: Procedure(s) (LRB):  CABGx1 (N/A)  EXCISION, MASS, MEDIASTINUM    Post-Operative Day: 1 Day Post-Op    Objective:     Vital Signs (Most Recent):  Temp: 98.1 °F (36.7 °C) (08/14/18 0315)  Pulse: 81 (08/14/18 0600)  Resp: (!) 21 (08/14/18 0600)  BP: 133/63 (08/14/18 0600)  SpO2: 99 % (08/14/18 0600) Vital Signs (24h Range):  Temp:  [98.1 °F (36.7 °C)-98.6 °F (37 °C)] 98.1 °F (36.7 °C)  Pulse:  [] 81  Resp:  [] 21  SpO2:  [96 %-100 %] 99 %  BP: ()/(51-78) 133/63  Arterial Line BP: ()/(55-87) 134/58     Weight: 86.7 kg (191 lb 2.2 oz)  Body mass index is 29.06 kg/m².      Intake/Output Summary (Last 24 hours) at 8/14/2018 0641  Last data filed at 8/14/2018 0600  Gross per 24 hour   Intake 5371 ml   Output 1826 ml   Net 3545 ml       Physical Exam   Constitutional: He appears well-developed and well-nourished. No distress. He is intubated.   HENT:   Head: Normocephalic and atraumatic.   Eyes: EOM are normal.   Cardiovascular: An irregular rhythm present. Exam reveals friction rub.   Pulmonary/Chest: Effort normal and breath sounds normal. No stridor. He is intubated. No respiratory distress. He has no wheezes. He has no rales.   Right and left mediastinal chest tubes in place draining sanguinous fluid, Anterior pleural chest tube in place draining sanguinous fluid.   Abdominal: Soft. Bowel sounds  are normal. He exhibits no distension and no mass. There is no tenderness. There is no rebound and no guarding.       Vents:  Vent Mode: SIMV (08/14/18 0502)  Ventilator Initiated: Yes (08/13/18 2111)  Set Rate: 16 bmp (08/14/18 0502)  Vt Set: 500 mL (08/14/18 0502)  Pressure Support: 10 cmH20 (08/14/18 0502)  PEEP/CPAP: 5 cmH20 (08/14/18 0502)  Oxygen Concentration (%): 41 (08/14/18 0600)  Peak Airway Pressure: 20 cmH2O (08/14/18 0502)  Plateau Pressure: 0 cmH20 (08/14/18 0502)  Total Ve: 10.3 mL (08/14/18 0502)  F/VT Ratio<105 (RSBI): (!) 33.76 (08/14/18 0502)    Lines/Drains/Airways     Central Venous Catheter Line                 Percutaneous Central Line Insertion/Assessment - Quad lumen  08/13/18 1331 right internal jugular less than 1 day         Percutaneous Central Line Insertion/Assessment - quad lumen  08/13/18 1331 right internal jugular;other (see comments) less than 1 day          Drain                 Chest Tube 08/13/18 1936 1 Anterior Pleural 19 Fr. less than 1 day         NG/OG Tube 08/13/18 2130 Right mouth less than 1 day         Urethral Catheter 08/13/18 1513 Temperature probe 14 Fr. less than 1 day         Y Chest Tube 1 and 2 08/13/18 1927 1 Right Mediastinal 19 Fr. 2 Left Mediastinal 19 Fr. less than 1 day          Airway                 Airway - Non-Surgical 08/13/18 1510 less than 1 day          Epidural Line                 Perineural Analgesia/Anesthesia Assessment (using dermatomes) Epidural 08/13/18 1520 less than 1 day          Arterial Line                 Arterial Line 08/13/18 1316 Left Radial less than 1 day          Line                 Pacer Wires 08/13/18 1900 less than 1 day          Peripheral Intravenous Line                 Peripheral IV - Single Lumen 08/13/18 1230 Left Forearm less than 1 day                Significant Labs:    CBC/Anemia Profile:  Recent Labs   Lab  08/13/18 2111 08/13/18 2114 08/14/18   0307   WBC  31.92*   --   26.00*   HGB  13.6*   --   13.5*    HCT  40.8  38  40.0   PLT  256   --   260   MCV  92   --   91   RDW  12.9   --   12.8        Chemistries:  Recent Labs   Lab  08/13/18   2111  08/14/18   0307   NA  138  138   K  4.7  4.5   CL  107  108   CO2  22*  18*   BUN  19  24*   CREATININE  1.1  1.3   CALCIUM  10.2  9.3   MG  2.3  2.3  2.0   PHOS  4.8*  4.8*  2.7       All pertinent labs within the past 24 hours have been reviewed.    Significant Imaging:  I have reviewed all pertinent imaging results/findings within the past 24 hours.    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 POD 1.    Plan:    Neuro:   -Intubated  -Sedation: Propofol gtt discontinued, starting precedex.  -Pain control: PRN acetaminophen, oxycodone, fentanyl  -Daily sedation vacations    Pulmonary:   -Intubated  -Vent settings  Vent Mode: SIMV  Oxygen Concentration (%):  [] 41  Resp Rate Total:  [16 br/min-30 br/min] 21 br/min  Vt Set:  [500 mL] 500 mL  PEEP/CPAP:  [5 cmH20] 5 cmH20  Pressure Support:  [10 cmH20] 10 cmH20  Mean Airway Pressure:  [7.7 cmH20-10 cmH20] 8.1 cmH20  -Daily SBT    Cardiac:  -S/p CABG x1 for which LAD was only bypassable lesion  -MAP goal  to increase coronary perfusion  -Drips: Epi increased to 0.25 from 0.1 mcg/kg/min, Levo decreased to 0.01 from 0.02 mcg/kg/min, Vasopressin  0.08 mcg/kg/min, Nitroglycerin 0.0002 mcg/kg/min  - mg qd    Renal:   -Wheeler in place  -UOP good post-op but slight drop this morning with good response to 250 cc albumin  -BUN 24/1.3 from 19/1.1 post-op    Fluids/Electrolytes/Nutrition/GI:   -Nutritional status: NPO  -replace lytes PRN  -Fluids: D5 1/2 NS w/ KCl 20 mEq at 5 cc/hr  -Bowel regimen  -Prophylaxis: Pantoprazole    Hematology/Oncology:  -H/H 13.5/40.0 stable from post-op  -INR/Plts 1.0/260 stable from post-op    Infectious  Disease:   -Afebrile  -WBC 26.0 from 31.92 post-op  -Abx: Ancef surgical ppx  -Continue to monitor    Endocrine:  -Glucose goal per primary  -Insulin gtt    Prophylaxis:  -Pantoprazole    Dispo:  -Continue care in the ICU setting  -Primary: CTS          Critical care was time spent personally by me on the following activities: development of treatment plan with patient or surrogate and bedside caregivers, discussions with consultants, evaluation of patient's response to treatment, examination of patient, ordering and performing treatments and interventions, ordering and review of laboratory studies, ordering and review of radiographic studies, pulse oximetry, re-evaluation of patient's condition.  This critical care time did not overlap with that of any other provider or involve time for any procedures.     BENOIT Desir MD  Critical Care - Surgery  [facility]

## 2018-08-14 NOTE — ANESTHESIA POSTPROCEDURE EVALUATION
"Anesthesia Post Evaluation    Patient: Anjum Campbell    Procedure(s) Performed: Procedure(s) (LRB):  CABGx1 (N/A)  EXCISION, MASS, MEDIASTINUM    Final Anesthesia Type: general  Patient location during evaluation: ICU  Patient participation: No - Unable to Participate, Sedation  Level of consciousness: sedated  Post-procedure vital signs: reviewed and stable  Pain management: adequate  Airway patency: patent  PONV status at discharge: No PONV  Anesthetic complications: no      Cardiovascular status: hemodynamically stable  Respiratory status: ETT and intubated  Hydration status: euvolemic  Follow-up not needed.        Visit Vitals  /63 (BP Location: Right arm, Patient Position: Lying)   Pulse 81   Temp 36.7 °C (98.1 °F) (Oral)   Resp (!) 21   Ht 5' 8" (1.727 m)   Wt 86.7 kg (191 lb 2.2 oz)   SpO2 99%   BMI 29.06 kg/m²       Pain/Griffin Score: Pain Assessment Performed: Yes (8/14/2018  3:15 AM)  Presence of Pain: non-verbal indicators absent (8/14/2018  3:15 AM)  Pain Rating Prior to Med Admin: 8 (8/14/2018  5:14 AM)  Pain Rating Post Med Admin: 0 (8/14/2018  5:45 AM)  Griffin Score: 10 (8/13/2018  2:40 PM)        "

## 2018-08-14 NOTE — BRIEF OP NOTE
[facility]  Cardiothoracic Surgery  Operative Note    SUMMARY     Date of Procedure: 8/13/2018     Procedure: Procedure(s) (LRB):  1)  CABGx1 with LIMA-LAD  [id number]  2)  EXCISION, MASS, MEDIASTINUM     Surgeon(s) and Role:     * Orville Payne MD - Primary     * Alfredito Guy MD - Fellow    Assisting Surgeon: None    Pre-Operative Diagnosis: Coronary artery disease, angina presence unspecified, unspecified vessel or lesion type, unspecified whether native or transplanted heart [I25.10]    Post-Operative Diagnosis: Post-Op Diagnosis Codes:     * Coronary artery disease, angina presence unspecified, unspecified vessel or lesion type, unspecified whether native or transplanted heart [I25.10]    Anesthesia: General        Description of the Findings of the Procedure: Good distal target probe patent to apex. Hyperexpanded lungs. Upper rmediastinal mass c/w retrosternal goiter.         Complications: None    Estimated Blood Loss (EBL): 100 mL             Specimens:   Specimen (12h ago, onward)    Start     Ordered    08/13/18 1926  Specimen to Pathology - Surgery  Once     Comments:  1) mediastinal mass - perm     Start Status   08/13/18 1926 Collected (08/13/18 1926)       08/13/18 1926                  Attestation:  I was present and scrubbed for the procedure.

## 2018-08-14 NOTE — PLAN OF CARE
S/p CABG x1 and mediastinum mass excision on 8/13/18. CM visited with pt and family member. Pt now extubated and able to answer questions. CM gave pt heart surgery pillow to use to splint incision to cough and deep breathe. Will cont to follow.      08/14/18 1428   Discharge Assessment   Assessment Type Discharge Planning Assessment   Confirmed/corrected address and phone number on facesheet? Yes   Assessment information obtained from? Patient   Communicated expected length of stay with patient/caregiver yes   Prior to hospitilization cognitive status: Alert/Oriented   Prior to hospitalization functional status: Independent   Current cognitive status: Alert/Oriented   Current Functional Status: Needs Assistance   Lives With spouse   Able to Return to Prior Arrangements yes   Is patient able to care for self after discharge? Yes   Who are your caregiver(s) and their phone number(s)? Barb, wife [phone]   Patient's perception of discharge disposition home or selfcare   Readmission Within The Last 30 Days no previous admission in last 30 days   Patient currently being followed by outpatient case management? No   Patient currently receives any other outside agency services? No   Equipment Currently Used at Home none   Do you have any problems affording any of your prescribed medications? No   Is the patient taking medications as prescribed? yes   Does the patient have transportation home? Yes   Transportation Available family or friend will provide   Does the patient receive services at the [facility]? No   Discharge Plan A Home with family   Discharge Plan B Home Health   Patient/Family In Agreement With Plan yes

## 2018-08-14 NOTE — H&P
[facility]  Critical Care - Surgery  History & Physical    Patient Name: Anjum Campbell  MRN: [id number]  Admission Date: 8/13/2018  Code Status: Full Code  Attending Physician: Orville Payne MD   Primary Care Provider: Enmanuel Doe MD   Principal Problem: <principal problem not specified>    Subjective:     HPI:  Mr. Campbell is a 58-year-old male with a past medical history of chronic systolic CHF, CAD, HTN. Heart cath on 6/28/18 showed two vessel coronary artery disease involving mainly proximal LAD and distal small non-dominant left circumflex. His most recent echo on 6/22/18 showed moderately-severely depressed LV systolic function (EF 30%), LV global hypokinesis with lateral wall motion abnormality, and grade 1 diastolic dysfunction. He smokes 1 ppd. 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 His EBL was 100 cc, he did not require any blood products in the OR, and he was transported from the OR intubated.    Hospital/ICU Course:  No notes on file    Follow-up For: Procedure(s) (LRB):  CABGx1 (N/A)  EXCISION, MASS, MEDIASTINUM    Post-Operative Day: Day of Surgery     Past Medical History:   Diagnosis Date    Back injury 1994    Chest pain     CHF (congestive heart failure)     Hypertension        Past Surgical History:   Procedure Laterality Date    back fusion   1995    Lumber surgery  1995       Review of patient's allergies indicates:  No Known Allergies    Family History     Problem Relation (Age of Onset)    Cancer Father        Tobacco Use    Smoking status: Current Every Day Smoker     Packs/day: 1.00     Years: 30.00     Pack years: 30.00     Types: Cigarettes    Smokeless tobacco: Never Used   Substance and Sexual Activity    Alcohol use: Yes     Comment: occaSIONAL    Drug use: No    Sexual activity: Not on file      Review of Systems     Objective:     Vital Signs (Most  Recent):  Temp: 98.6 °F (37 °C) (08/13/18 2115)  Pulse: 79 (08/13/18 2111)  Resp: (!) 120 (08/13/18 2111)  BP: 108/65 (08/13/18 1440)  SpO2: 100 % (08/13/18 2111) Vital Signs (24h Range):  Temp:  [98.3 °F (36.8 °C)-98.6 °F (37 °C)] 98.6 °F (37 °C)  Pulse:  [62-79] 79  Resp:  [] 120  SpO2:  [96 %-100 %] 100 %  BP: ()/(51-65) 108/65     Weight: 80.3 kg (177 lb)  Body mass index is 26.91 kg/m².      Intake/Output Summary (Last 24 hours) at 8/13/2018 2147  Last data filed at 8/13/2018 2125  Gross per 24 hour   Intake 4000 ml   Output 1029 ml   Net 2971 ml       Physical Exam   Constitutional: He appears well-developed and well-nourished. No distress.   HENT:   Head: Normocephalic and atraumatic.   Cardiovascular: An irregular rhythm present. Exam reveals friction rub. Exam reveals no gallop.   No murmur heard.  Pulmonary/Chest: Effort normal and breath sounds normal. No stridor. No respiratory distress. He has no wheezes. He has no rales.   Abdominal: Soft. Bowel sounds are normal.   Genitourinary:   Genitourinary Comments: Right and left mediastinal chest tubes in place draining sanguinous fluid, Anterior pleural chest tube in place draining sanguinous fluid       Vents:  Vent Mode: SIMV (08/13/18 2111)  Ventilator Initiated: Yes (08/13/18 2111)  Set Rate: 16 bmp (08/13/18 2111)  Vt Set: 500 mL (08/13/18 2111)  Pressure Support: 10 cmH20 (08/13/18 2111)  PEEP/CPAP: 5 cmH20 (08/13/18 2111)  Oxygen Concentration (%): 100 (08/13/18 2111)  Peak Airway Pressure: 28 cmH2O (08/13/18 2111)  Plateau Pressure: 0 cmH20 (08/13/18 2111)  Total Ve: 8.8 mL (08/13/18 2111)  F/VT Ratio<105 (RSBI): 217.79 (08/13/18 2111)    Lines/Drains/Airways     Central Venous Catheter Line                 Percutaneous Central Line Insertion/Assessment - Quad lumen  08/13/18 1331 right internal jugular less than 1 day         Percutaneous Central Line Insertion/Assessment - quad lumen  08/13/18 1331 right internal jugular;other (see  comments) less than 1 day          Drain                 Chest Tube 08/13/18 1936 1 Anterior Pleural 19 Fr. less than 1 day         Urethral Catheter 08/13/18 1513 Temperature probe 14 Fr. less than 1 day         Y Chest Tube 1 and 2 08/13/18 1927 1 Right Mediastinal 19 Fr. 2 Left Mediastinal 19 Fr. less than 1 day          Airway                 Airway - Non-Surgical 08/13/18 1510 less than 1 day          Epidural Line                 Perineural Analgesia/Anesthesia Assessment (using dermatomes) Epidural 08/13/18 1520 less than 1 day          Arterial Line                 Arterial Line 08/13/18 1316 Left Radial less than 1 day          Line                 Pacer Wires 08/13/18 1900 less than 1 day          Peripheral Intravenous Line                 Peripheral IV - Single Lumen 08/13/18 1230 Left Forearm less than 1 day                Significant Labs:    CBC/Anemia Profile:  Recent Labs   Lab  08/13/18   1821  08/13/18   1846  08/13/18   2114   HCT  28*  28*  38        Chemistries:  No results for input(s): NA, K, CL, CO2, BUN, CREATININE, CALCIUM, ALBUMIN, PROT, BILITOT, ALKPHOS, ALT, AST, GLUCOSE, MG, PHOS in the last 48 hours.    All pertinent labs within the past 24 hours have been reviewed.    Significant Imaging: I have reviewed all pertinent imaging results/findings within the past 24 hours.    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 POD 0.    Plan:    Neuro:   -Intubated  -Sedation: Propofol gtt  -Pain control: PRN acetaminophen, oxycodone, fentanyl  -Daily sedation vacations    Pulmonary:   -Intubated  -Vent settings  Vent Mode: SIMV  Oxygen Concentration (%):  [100] 100  Resp Rate Total:  [17 br/min] 17 br/min  Vt Set:  [500 mL] 500 mL  PEEP/CPAP:  [5 cmH20] 5 cmH20  Pressure Support:  [10 cmH20] 10 cmH20  Mean Airway  Pressure:  [9.1 cmH20] 9.1 cmH20  -Daily SBT    Cardiac:  -S/p CABG x1 for which LAD was only bypassable lesion  -MAP goal  to increase coronary perfusion  -Drips: Epi 0.1 mcg/kg/min, Levo 0.02 mcg/kg/min, Vasopressin 0.08 mcg/kg/min, Nitroglycerin 0.2 mcg/kg/min  - mg qd    Renal:   -Wheeler in place  -Monitor UOP post-op  -Monitor Bun/Cr post-op     Fluids/Electrolytes/Nutrition/GI:   -Nutritional status: NPO  -replace lytes PRN  -Fluids: D5 1/2 NS w/ KCl 20 mEq at 5 cc/hr  -Bowel regimen  -Prophylaxis: Pantoprazole    Hematology/Oncology:  -H/H 13.6/40.8 post-op  -INR/Plts 1.1/256 post-op    Infectious Disease:   -Afebrile  -WBC 31.92 post-op  -Abx: Ancef surgical ppx  -Continue to monitor    Endocrine:  -Glucose goal per primary  -Insulin gtt    Prophylaxis:  -Pantoprazole    Dispo:  -Continue care in the ICU setting  -Primary: CTS          Critical care was time spent personally by me on the following activities: development of treatment plan with patient or surrogate and bedside caregivers, discussions with consultants, evaluation of patient's response to treatment, examination of patient, ordering and performing treatments and interventions, ordering and review of laboratory studies, ordering and review of radiographic studies, pulse oximetry, re-evaluation of patient's condition.  This critical care time did not overlap with that of any other provider or involve time for any procedures.     BENOIT Desir MD  Critical Care - Surgery  [facility]

## 2018-08-14 NOTE — SUBJECTIVE & OBJECTIVE
Interval History/Significant Events: Afebrile, has gone up on epi and down on levo overnight. Good urine output post-op and this morning following 250 cc albumin bolus. WBC trending down.    Follow-up For: Procedure(s) (LRB):  CABGx1 (N/A)  EXCISION, MASS, MEDIASTINUM    Post-Operative Day: 1 Day Post-Op    Objective:     Vital Signs (Most Recent):  Temp: 98.1 °F (36.7 °C) (08/14/18 0315)  Pulse: 81 (08/14/18 0600)  Resp: (!) 21 (08/14/18 0600)  BP: 133/63 (08/14/18 0600)  SpO2: 99 % (08/14/18 0600) Vital Signs (24h Range):  Temp:  [98.1 °F (36.7 °C)-98.6 °F (37 °C)] 98.1 °F (36.7 °C)  Pulse:  [] 81  Resp:  [] 21  SpO2:  [96 %-100 %] 99 %  BP: ()/(51-78) 133/63  Arterial Line BP: ()/(55-87) 134/58     Weight: 86.7 kg (191 lb 2.2 oz)  Body mass index is 29.06 kg/m².      Intake/Output Summary (Last 24 hours) at 8/14/2018 0641  Last data filed at 8/14/2018 0600  Gross per 24 hour   Intake 5371 ml   Output 1826 ml   Net 3545 ml       Physical Exam   Constitutional: He appears well-developed and well-nourished. No distress. He is intubated.   HENT:   Head: Normocephalic and atraumatic.   Eyes: EOM are normal.   Cardiovascular: An irregular rhythm present. Exam reveals friction rub.   Pulmonary/Chest: Effort normal and breath sounds normal. No stridor. He is intubated. No respiratory distress. He has no wheezes. He has no rales.   Right and left mediastinal chest tubes in place draining sanguinous fluid, Anterior pleural chest tube in place draining sanguinous fluid.   Abdominal: Soft. Bowel sounds are normal. He exhibits no distension and no mass. There is no tenderness. There is no rebound and no guarding.       Vents:  Vent Mode: SIMV (08/14/18 0502)  Ventilator Initiated: Yes (08/13/18 2111)  Set Rate: 16 bmp (08/14/18 0502)  Vt Set: 500 mL (08/14/18 0502)  Pressure Support: 10 cmH20 (08/14/18 0502)  PEEP/CPAP: 5 cmH20 (08/14/18 0502)  Oxygen Concentration (%): 41 (08/14/18 0600)  Peak Airway  Pressure: 20 cmH2O (08/14/18 0502)  Plateau Pressure: 0 cmH20 (08/14/18 0502)  Total Ve: 10.3 mL (08/14/18 0502)  F/VT Ratio<105 (RSBI): (!) 33.76 (08/14/18 0502)    Lines/Drains/Airways     Central Venous Catheter Line                 Percutaneous Central Line Insertion/Assessment - Quad lumen  08/13/18 1331 right internal jugular less than 1 day         Percutaneous Central Line Insertion/Assessment - quad lumen  08/13/18 1331 right internal jugular;other (see comments) less than 1 day          Drain                 Chest Tube 08/13/18 1936 1 Anterior Pleural 19 Fr. less than 1 day         NG/OG Tube 08/13/18 2130 Right mouth less than 1 day         Urethral Catheter 08/13/18 1513 Temperature probe 14 Fr. less than 1 day         Y Chest Tube 1 and 2 08/13/18 1927 1 Right Mediastinal 19 Fr. 2 Left Mediastinal 19 Fr. less than 1 day          Airway                 Airway - Non-Surgical 08/13/18 1510 less than 1 day          Epidural Line                 Perineural Analgesia/Anesthesia Assessment (using dermatomes) Epidural 08/13/18 1520 less than 1 day          Arterial Line                 Arterial Line 08/13/18 1316 Left Radial less than 1 day          Line                 Pacer Wires 08/13/18 1900 less than 1 day          Peripheral Intravenous Line                 Peripheral IV - Single Lumen 08/13/18 1230 Left Forearm less than 1 day                Significant Labs:    CBC/Anemia Profile:  Recent Labs   Lab  08/13/18 2111 08/13/18 2114 08/14/18   0307   WBC  31.92*   --   26.00*   HGB  13.6*   --   13.5*   HCT  40.8  38  40.0   PLT  256   --   260   MCV  92   --   91   RDW  12.9   --   12.8        Chemistries:  Recent Labs   Lab  08/13/18 2111 08/14/18   0307   NA  138  138   K  4.7  4.5   CL  107  108   CO2  22*  18*   BUN  19  24*   CREATININE  1.1  1.3   CALCIUM  10.2  9.3   MG  2.3  2.3  2.0   PHOS  4.8*  4.8*  2.7       All pertinent labs within the past 24 hours have been  reviewed.    Significant Imaging:  I have reviewed all pertinent imaging results/findings within the past 24 hours.

## 2018-08-15 LAB
ANION GAP SERPL CALC-SCNC: 9 MMOL/L
BASOPHILS # BLD AUTO: 0.03 K/UL
BASOPHILS # BLD AUTO: 0.04 K/UL
BASOPHILS NFR BLD: 0.2 %
BASOPHILS NFR BLD: 0.2 %
BUN SERPL-MCNC: 21 MG/DL
CALCIUM SERPL-MCNC: 8.7 MG/DL
CHLORIDE SERPL-SCNC: 103 MMOL/L
CO2 SERPL-SCNC: 28 MMOL/L
CREAT SERPL-MCNC: 0.9 MG/DL
DIFFERENTIAL METHOD: ABNORMAL
DIFFERENTIAL METHOD: ABNORMAL
EOSINOPHIL # BLD AUTO: 0 K/UL
EOSINOPHIL # BLD AUTO: 0 K/UL
EOSINOPHIL NFR BLD: 0 %
EOSINOPHIL NFR BLD: 0 %
ERYTHROCYTE [DISTWIDTH] IN BLOOD BY AUTOMATED COUNT: 13.2 %
ERYTHROCYTE [DISTWIDTH] IN BLOOD BY AUTOMATED COUNT: 13.2 %
EST. GFR  (AFRICAN AMERICAN): >60 ML/MIN/1.73 M^2
EST. GFR  (NON AFRICAN AMERICAN): >60 ML/MIN/1.73 M^2
GLUCOSE SERPL-MCNC: 134 MG/DL
HCT VFR BLD AUTO: 28 %
HCT VFR BLD AUTO: 29.2 %
HGB BLD-MCNC: 9.4 G/DL
HGB BLD-MCNC: 9.8 G/DL
IMM GRANULOCYTES # BLD AUTO: 0.08 K/UL
IMM GRANULOCYTES # BLD AUTO: 0.23 K/UL
IMM GRANULOCYTES NFR BLD AUTO: 0.5 %
IMM GRANULOCYTES NFR BLD AUTO: 1.1 %
LYMPHOCYTES # BLD AUTO: 1.3 K/UL
LYMPHOCYTES # BLD AUTO: 1.7 K/UL
LYMPHOCYTES NFR BLD: 7.9 %
LYMPHOCYTES NFR BLD: 7.9 %
MAGNESIUM SERPL-MCNC: 2.3 MG/DL
MCH RBC QN AUTO: 29.9 PG
MCH RBC QN AUTO: 30.5 PG
MCHC RBC AUTO-ENTMCNC: 33.6 G/DL
MCHC RBC AUTO-ENTMCNC: 33.6 G/DL
MCV RBC AUTO: 89 FL
MCV RBC AUTO: 91 FL
MONOCYTES # BLD AUTO: 1.2 K/UL
MONOCYTES # BLD AUTO: 1.7 K/UL
MONOCYTES NFR BLD: 7.4 %
MONOCYTES NFR BLD: 8 %
NEUTROPHILS # BLD AUTO: 13.6 K/UL
NEUTROPHILS # BLD AUTO: 17.8 K/UL
NEUTROPHILS NFR BLD: 82.8 %
NEUTROPHILS NFR BLD: 84 %
NRBC BLD-RTO: 0 /100 WBC
NRBC BLD-RTO: 0 /100 WBC
PHOSPHATE SERPL-MCNC: 3 MG/DL
PLATELET # BLD AUTO: 128 K/UL
PLATELET # BLD AUTO: 156 K/UL
PMV BLD AUTO: 10.1 FL
PMV BLD AUTO: 10.7 FL
POCT GLUCOSE: 100 MG/DL (ref 70–110)
POCT GLUCOSE: 121 MG/DL (ref 70–110)
POCT GLUCOSE: 126 MG/DL (ref 70–110)
POCT GLUCOSE: 130 MG/DL (ref 70–110)
POCT GLUCOSE: 131 MG/DL (ref 70–110)
POCT GLUCOSE: 134 MG/DL (ref 70–110)
POCT GLUCOSE: 138 MG/DL (ref 70–110)
POCT GLUCOSE: 141 MG/DL (ref 70–110)
POCT GLUCOSE: 144 MG/DL (ref 70–110)
POCT GLUCOSE: 144 MG/DL (ref 70–110)
POCT GLUCOSE: 145 MG/DL (ref 70–110)
POCT GLUCOSE: 85 MG/DL (ref 70–110)
POCT GLUCOSE: 86 MG/DL (ref 70–110)
POCT GLUCOSE: 87 MG/DL (ref 70–110)
POTASSIUM SERPL-SCNC: 4 MMOL/L
POTASSIUM SERPL-SCNC: 4.2 MMOL/L
RBC # BLD AUTO: 3.14 M/UL
RBC # BLD AUTO: 3.21 M/UL
SODIUM SERPL-SCNC: 140 MMOL/L
WBC # BLD AUTO: 16.21 K/UL
WBC # BLD AUTO: 21.47 K/UL

## 2018-08-15 PROCEDURE — 63600175 PHARM REV CODE 636 W HCPCS: Performed by: STUDENT IN AN ORGANIZED HEALTH CARE EDUCATION/TRAINING PROGRAM

## 2018-08-15 PROCEDURE — 63600175 PHARM REV CODE 636 W HCPCS: Performed by: THORACIC SURGERY (CARDIOTHORACIC VASCULAR SURGERY)

## 2018-08-15 PROCEDURE — A4216 STERILE WATER/SALINE, 10 ML: HCPCS | Performed by: STUDENT IN AN ORGANIZED HEALTH CARE EDUCATION/TRAINING PROGRAM

## 2018-08-15 PROCEDURE — 99232 SBSQ HOSP IP/OBS MODERATE 35: CPT | Mod: ,,, | Performed by: NURSE PRACTITIONER

## 2018-08-15 PROCEDURE — 97165 OT EVAL LOW COMPLEX 30 MIN: CPT

## 2018-08-15 PROCEDURE — 85025 COMPLETE CBC W/AUTO DIFF WBC: CPT | Mod: 91

## 2018-08-15 PROCEDURE — 94640 AIRWAY INHALATION TREATMENT: CPT

## 2018-08-15 PROCEDURE — 80048 BASIC METABOLIC PNL TOTAL CA: CPT

## 2018-08-15 PROCEDURE — C9113 INJ PANTOPRAZOLE SODIUM, VIA: HCPCS | Performed by: STUDENT IN AN ORGANIZED HEALTH CARE EDUCATION/TRAINING PROGRAM

## 2018-08-15 PROCEDURE — 84100 ASSAY OF PHOSPHORUS: CPT

## 2018-08-15 PROCEDURE — 84132 ASSAY OF SERUM POTASSIUM: CPT

## 2018-08-15 PROCEDURE — 94799 UNLISTED PULMONARY SVC/PX: CPT

## 2018-08-15 PROCEDURE — 25000003 PHARM REV CODE 250: Performed by: STUDENT IN AN ORGANIZED HEALTH CARE EDUCATION/TRAINING PROGRAM

## 2018-08-15 PROCEDURE — 27000221 HC OXYGEN, UP TO 24 HOURS

## 2018-08-15 PROCEDURE — 36415 COLL VENOUS BLD VENIPUNCTURE: CPT

## 2018-08-15 PROCEDURE — 83735 ASSAY OF MAGNESIUM: CPT

## 2018-08-15 PROCEDURE — 25000242 PHARM REV CODE 250 ALT 637 W/ HCPCS: Performed by: STUDENT IN AN ORGANIZED HEALTH CARE EDUCATION/TRAINING PROGRAM

## 2018-08-15 PROCEDURE — 97161 PT EVAL LOW COMPLEX 20 MIN: CPT

## 2018-08-15 PROCEDURE — 94761 N-INVAS EAR/PLS OXIMETRY MLT: CPT

## 2018-08-15 PROCEDURE — 20600001 HC STEP DOWN PRIVATE ROOM

## 2018-08-15 RX ORDER — IBUPROFEN 200 MG
16 TABLET ORAL
Status: DISCONTINUED | OUTPATIENT
Start: 2018-08-15 | End: 2018-08-16

## 2018-08-15 RX ORDER — INSULIN ASPART 100 [IU]/ML
1-10 INJECTION, SOLUTION INTRAVENOUS; SUBCUTANEOUS
Status: DISCONTINUED | OUTPATIENT
Start: 2018-08-15 | End: 2018-08-16

## 2018-08-15 RX ORDER — FUROSEMIDE 10 MG/ML
20 INJECTION INTRAMUSCULAR; INTRAVENOUS 2 TIMES DAILY
Status: DISCONTINUED | OUTPATIENT
Start: 2018-08-15 | End: 2018-08-18 | Stop reason: HOSPADM

## 2018-08-15 RX ORDER — POTASSIUM CHLORIDE 20 MEQ/1
20 TABLET, EXTENDED RELEASE ORAL EVERY 12 HOURS
Status: DISCONTINUED | OUTPATIENT
Start: 2018-08-15 | End: 2018-08-18 | Stop reason: HOSPADM

## 2018-08-15 RX ORDER — PANTOPRAZOLE SODIUM 40 MG/1
40 TABLET, DELAYED RELEASE ORAL DAILY
Status: DISCONTINUED | OUTPATIENT
Start: 2018-08-16 | End: 2018-08-15

## 2018-08-15 RX ORDER — ATORVASTATIN CALCIUM 20 MG/1
40 TABLET, FILM COATED ORAL NIGHTLY
Status: DISCONTINUED | OUTPATIENT
Start: 2018-08-15 | End: 2018-08-18 | Stop reason: HOSPADM

## 2018-08-15 RX ORDER — IBUPROFEN 200 MG
24 TABLET ORAL
Status: DISCONTINUED | OUTPATIENT
Start: 2018-08-15 | End: 2018-08-16

## 2018-08-15 RX ORDER — HYDRALAZINE HYDROCHLORIDE 20 MG/ML
10 INJECTION INTRAMUSCULAR; INTRAVENOUS ONCE
Status: COMPLETED | OUTPATIENT
Start: 2018-08-15 | End: 2018-08-15

## 2018-08-15 RX ORDER — PANTOPRAZOLE SODIUM 40 MG/1
40 TABLET, DELAYED RELEASE ORAL
Status: DISCONTINUED | OUTPATIENT
Start: 2018-08-16 | End: 2018-08-18 | Stop reason: HOSPADM

## 2018-08-15 RX ORDER — GLUCAGON 1 MG
1 KIT INJECTION
Status: DISCONTINUED | OUTPATIENT
Start: 2018-08-15 | End: 2018-08-16

## 2018-08-15 RX ORDER — ASPIRIN 325 MG
325 TABLET, DELAYED RELEASE (ENTERIC COATED) ORAL DAILY
Status: DISCONTINUED | OUTPATIENT
Start: 2018-08-16 | End: 2018-08-18 | Stop reason: HOSPADM

## 2018-08-15 RX ADMIN — OXYCODONE HYDROCHLORIDE 10 MG: 5 TABLET ORAL at 07:08

## 2018-08-15 RX ADMIN — CEFAZOLIN 2 G: 1 INJECTION, POWDER, FOR SOLUTION INTRAMUSCULAR; INTRAVENOUS at 08:08

## 2018-08-15 RX ADMIN — FUROSEMIDE 20 MG: 10 INJECTION, SOLUTION INTRAMUSCULAR; INTRAVENOUS at 05:08

## 2018-08-15 RX ADMIN — ASPIRIN 325 MG ORAL TABLET 325 MG: 325 PILL ORAL at 08:08

## 2018-08-15 RX ADMIN — MUPIROCIN 1 G: 20 OINTMENT TOPICAL at 09:08

## 2018-08-15 RX ADMIN — OXYCODONE HYDROCHLORIDE 5 MG: 5 TABLET ORAL at 02:08

## 2018-08-15 RX ADMIN — IPRATROPIUM BROMIDE AND ALBUTEROL SULFATE 3 ML: .5; 3 SOLUTION RESPIRATORY (INHALATION) at 03:08

## 2018-08-15 RX ADMIN — Medication 3 ML: at 06:08

## 2018-08-15 RX ADMIN — Medication 3 ML: at 02:08

## 2018-08-15 RX ADMIN — ATORVASTATIN CALCIUM 40 MG: 20 TABLET, FILM COATED ORAL at 09:08

## 2018-08-15 RX ADMIN — IPRATROPIUM BROMIDE AND ALBUTEROL SULFATE 3 ML: .5; 3 SOLUTION RESPIRATORY (INHALATION) at 04:08

## 2018-08-15 RX ADMIN — POTASSIUM CHLORIDE 20 MEQ: 200 INJECTION, SOLUTION INTRAVENOUS at 05:08

## 2018-08-15 RX ADMIN — POTASSIUM CHLORIDE 20 MEQ: 1500 TABLET, EXTENDED RELEASE ORAL at 09:08

## 2018-08-15 RX ADMIN — IPRATROPIUM BROMIDE AND ALBUTEROL SULFATE 3 ML: .5; 3 SOLUTION RESPIRATORY (INHALATION) at 07:08

## 2018-08-15 RX ADMIN — IPRATROPIUM BROMIDE AND ALBUTEROL SULFATE 3 ML: .5; 3 SOLUTION RESPIRATORY (INHALATION) at 11:08

## 2018-08-15 RX ADMIN — HYDRALAZINE HYDROCHLORIDE 10 MG: 20 INJECTION INTRAMUSCULAR; INTRAVENOUS at 12:08

## 2018-08-15 RX ADMIN — POLYETHYLENE GLYCOL 3350 17 G: 17 POWDER, FOR SOLUTION ORAL at 08:08

## 2018-08-15 RX ADMIN — DOCUSATE SODIUM 100 MG: 100 CAPSULE, LIQUID FILLED ORAL at 09:08

## 2018-08-15 RX ADMIN — Medication 3 ML: at 09:08

## 2018-08-15 RX ADMIN — PANTOPRAZOLE SODIUM 40 MG: 40 INJECTION, POWDER, FOR SOLUTION INTRAVENOUS at 09:08

## 2018-08-15 RX ADMIN — FUROSEMIDE 20 MG: 10 INJECTION, SOLUTION INTRAMUSCULAR; INTRAVENOUS at 08:08

## 2018-08-15 RX ADMIN — DOCUSATE SODIUM 100 MG: 100 CAPSULE, LIQUID FILLED ORAL at 08:08

## 2018-08-15 RX ADMIN — POTASSIUM CHLORIDE 20 MEQ: 1500 TABLET, EXTENDED RELEASE ORAL at 10:08

## 2018-08-15 RX ADMIN — HYDROMORPHONE HYDROCHLORIDE 0.5 MG: 1 INJECTION, SOLUTION INTRAMUSCULAR; INTRAVENOUS; SUBCUTANEOUS at 09:08

## 2018-08-15 NOTE — ASSESSMENT & PLAN NOTE
BG goal 110-140  CTS protocol    Change bg monitoring to ac/hs as diet to advance.   Start moderate dose correction scale.   If BG trending up off insulin infusion; will restart transition insulin infusion.     Discharge plans- TBD.

## 2018-08-15 NOTE — PLAN OF CARE
Problem: Physical Therapy Goal  Goal: Physical Therapy Goal  Goals to be met by: 18    Patient will increase functional independence with mobility by performin. Supine to sit with Stand-by Assistance -not met  2. Sit to stand transfer with Supervision-not met  3. Gait  x 250 feet with Supervision -not met    Evaluation completed and goals appropriate. Ingrid Milton, PT 8/15/2018

## 2018-08-15 NOTE — PROGRESS NOTES
Notified Dr. Keene of CVP flat 18. Upon laying pt flat, SpO2 dropped to 88%. Upon sitting up, SpO2 increased to 92%, NC increased to 2L. Upon auscultation, lungs sound coarse/diminished. Pt with productive cough. Instructed patient to use incentive spirometer, and patient immediately demonstrated successful use. Also notified Dr. Keene of UO 60-70 mL/hr. Orders to give 40 mg Lasix IVP x1 and duo-nebs inhalation treatments q4h. RT Kenyatta notified of duo-neb treatment due now. Will continue to monitor pt closely.

## 2018-08-15 NOTE — PROGRESS NOTES
[facility]  Cardiothoracic Surgery  Progress Note    Patient Name: Anjum Campbell  MRN: [id number]  Admission Date: 8/13/2018  Hospital Length of Stay: 2 days  Code Status: Full Code   Attending Physician: Orville Payne MD   Referring Provider: Orville Payne MD  Principal Problem:CAD (coronary artery disease)    Subjective:     Post-Op Info:  Procedure(s) (LRB):  CABGx1 (N/A)  EXCISION, MASS, MEDIASTINUM   2 Days Post-Op     Interval History: Extubated yesterday to NC. No acute events overnight. Epi, vaso, levo weaned off. Sitting in chair this AM    Medications:  Continuous Infusions:   dextrose 5 % and 0.45 % NaCl with KCl 20 mEq Stopped (08/15/18 0100)    insulin (HUMAN R) infusion (adults) Stopped (08/15/18 0615)     Scheduled Meds:   albuterol-ipratropium  3 mL Nebulization Q4H    aspirin  325 mg Oral Daily    docusate sodium  100 mg Oral BID    furosemide        furosemide  20 mg Intravenous BID    mupirocin  1 g Nasal BID    [START ON 8/16/2018] pantoprazole  40 mg Oral Daily    polyethylene glycol  17 g Oral Daily    sodium chloride 0.9%  3 mL Intravenous Q8H     PRN Meds:acetaminophen, bisacodyl, dextrose 50%, dextrose 50%, HYDROmorphone, magnesium sulfate IVPB, magnesium sulfate IVPB, metoclopramide HCl, ondansetron, oxyCODONE, oxyCODONE, potassium chloride in water **AND** [DISCONTINUED] potassium chloride in water **AND** [DISCONTINUED] potassium chloride in water     Objective:     Vital Signs (Most Recent):  Temp: 97.7 °F (36.5 °C) (08/15/18 0700)  Pulse: 99 (08/15/18 0815)  Resp: (!) 99 (08/15/18 0815)  BP: 130/60 (08/15/18 0815)  SpO2: 96 % (08/15/18 0815) Vital Signs (24h Range):  Temp:  [97.7 °F (36.5 °C)-98.9 °F (37.2 °C)] 97.7 °F (36.5 °C)  Pulse:  [] 99  Resp:  [] 99  SpO2:  [90 %-100 %] 96 %  BP: (103-130)/(56-68) 130/60  Arterial Line BP: (102-153)/(45-86) 132/57     Weight: 86.7 kg (191 lb 2.2 oz)  Body mass index is 29.06  kg/m².    SpO2: 96 %  O2 Device (Oxygen Therapy): nasal cannula    Intake/Output - Last 3 Shifts     ** Patient Encounter Information Not Found **          Lines/Drains/Airways     Central Venous Catheter Line                 Percutaneous Central Line Insertion/Assessment - Quad lumen  08/13/18 1331 right internal jugular 1 day         Percutaneous Central Line Insertion/Assessment - quad lumen  08/13/18 1331 right internal jugular;other (see comments) 1 day          Drain                 Chest Tube 08/13/18 1936 1 Anterior Pleural 19 Fr. 1 day         Urethral Catheter 08/13/18 1513 Temperature probe 14 Fr. 1 day         Y Chest Tube 1 and 2 08/13/18 1927 1 Right Mediastinal 19 Fr. 2 Left Mediastinal 19 Fr. 1 day          Epidural Line                 Perineural Analgesia/Anesthesia Assessment (using dermatomes) Epidural 08/13/18 1520 1 day          Arterial Line                 Arterial Line 08/14/18 1600 Right Radial less than 1 day          Line                 Pacer Wires 08/13/18 1900 1 day          Peripheral Intravenous Line                 Peripheral IV - Single Lumen 08/13/18 1230 Left Forearm 1 day                Physical Exam   Constitutional: He appears well-developed and well-nourished. No distress.   HENT:   Head: Normocephalic and atraumatic.   Eyes: EOM are normal.   Neck: Neck supple.   Cardiovascular: Normal rate and regular rhythm. Exam reveals no gallop and no friction rub.   No murmur heard.  Sternal incision c/d/i. CTs with SS output   Pulmonary/Chest: Effort normal and breath sounds normal. No stridor. No respiratory distress. He has no wheezes. He has no rales.   Abdominal: Soft. Bowel sounds are normal.   Genitourinary:   Genitourinary Comments: Right and left mediastinal chest tubes in place draining sanguinous fluid, Anterior pleural chest tube in place draining sanguinous fluid   Neurological:   Sedated   Skin: Skin is warm and dry.   Psychiatric:   Sedated   Nursing note and vitals  reviewed.      Significant Labs:  ABGs:   Recent Labs   Lab  08/14/18   1237   PH  7.438   PCO2  23.2*   PO2  83   HCO3  15.7*   POCSATURATED  97   BE  -9     CBC:   Recent Labs   Lab  08/15/18   0300   WBC  21.47*   RBC  3.14*   HGB  9.4*   HCT  28.0*   PLT  156   MCV  89   MCH  29.9   MCHC  33.6     CMP:   Recent Labs   Lab  08/15/18   0300   GLU  134*   CALCIUM  8.7   NA  140   K  4.0   CO2  28   CL  103   BUN  21*   CREATININE  0.9     Coagulation:   Recent Labs   Lab  08/14/18   0307   INR  1.0   APTT  <21.0     Lactic Acid: No results for input(s): LACTATE in the last 48 hours.  LFTs: No results for input(s): ALT, AST, ALKPHOS, BILITOT, PROT, ALBUMIN in the last 48 hours.    Significant Diagnostics:  I have reviewed all pertinent imaging results/findings within the past 24 hours.    Assessment/Plan:     * CAD (coronary artery disease)    58 year old male s/p CABG (LIMA to LAD) and resection of mediastinal mass 8/13/18    Neuro:   - Sedation: None  - Pain control: Oxycodone, Dilaudid PRN     Pulmonary:   - Extubated  - Daily CXRs  - CTs to suction   - Duonebs PRN  - OOBTC     Cardiac:  - Drips: epi, levo, vaso weaned off  - D/C nitro gtt  - MAP goal >70mmHg   - D/C lines     Renal:   - BUN/CR normalized  - Start lasix 20mg BID  - Keep fierro  - Trend BMP     Infectious Disease:   - Marcelle-op ancef x48 hours  - Trend WBC     Hematology/Oncology:  - Drop in H/H; recheck this afternoon  - Coags normal   - Trend CBC     Endocrine:  - Insulin gtt; transition to SSI     Fluids/Electrolytes/Nutrition/GI:   - Cardiac diet  - ADAT once extubated  - Trend CMP  - Replace Lytes PRN      Dispo:  Stepdown to CTSU.   Primary team: CTS              Paulie Keene MD  Cardiothoracic Surgery  [facility]

## 2018-08-15 NOTE — SUBJECTIVE & OBJECTIVE
"Interval HPI:   Overnight events: remains in ICU, extubated yesterday afternoon. BG at or slightly below goal overnight. Insulin infusion 0.5-1.1 u/hr overnight. Insulin infusion suspended this AM with BG in 80s.   Eating:   NPO  Nausea: No  Hypoglycemia and intervention: No  Fever: No  TPN and/or TF: No    /60   Pulse 99   Temp 97.7 °F (36.5 °C) (Oral)   Resp (!) 99   Ht 5' 8" (1.727 m)   Wt 86.7 kg (191 lb 2.2 oz)   SpO2 96%   BMI 29.06 kg/m²     Labs Reviewed and Include    Recent Labs   Lab  08/15/18   0300   GLU  134*   CALCIUM  8.7   NA  140   K  4.0   CO2  28   CL  103   BUN  21*   CREATININE  0.9     Lab Results   Component Value Date    WBC 21.47 (H) 08/15/2018    HGB 9.4 (L) 08/15/2018    HCT 28.0 (L) 08/15/2018    MCV 89 08/15/2018     08/15/2018     No results for input(s): TSH, FREET4 in the last 168 hours.  Lab Results   Component Value Date    HGBA1C 5.9 (H) 08/09/2018       Nutritional status:   Body mass index is 29.06 kg/m².  Lab Results   Component Value Date    ALBUMIN 4.2 08/09/2018    ALBUMIN 4.1 06/20/2018     No results found for: PREALBUMIN    Estimated Creatinine Clearance: 98.1 mL/min (based on SCr of 0.9 mg/dL).    Accu-Checks  Recent Labs      08/15/18   0058  08/15/18   0207  08/15/18   0307  08/15/18   0405  08/15/18   0501  08/15/18   0612  08/15/18   0614  08/15/18   0635  08/15/18   0735  08/15/18   0823   POCTGLUCOSE  141*  134*  144*  130*  131*  87  85  145*  138*  86       Current Medications and/or Treatments Impacting Glycemic Control  Immunotherapy:    Immunosuppressants     None        Steroids:   Hormones (From admission, onward)    None        Pressors:    Autonomic Drugs (From admission, onward)    Start     Stop Route Frequency Ordered    08/14/18 1135  EPINEPHrine (ADRENALIN) 1 mg/mL (1 mL) injection     Comments:  Created by cabinet override    08/14 2344   08/14/18 1135    08/13/18 6202  EPINEPHrine (ADRENALIN) 4 mg in sodium chloride 0.9% 250 mL " infusion     Question Answer Comment   Titrate by: (in mcg/kg/min) 0.02    Titrate interval: (in minutes) 5    Titrate to maintain: (SBP or MAP or Cardiac Index) MAP    Greater than: (in mmHg) 65    Cardiac index greater than: (in L/min) 2.2    Maximum dose: (in mcg/kg/min) 2        -- IV Continuous 08/13/18 2107        Hyperglycemia/Diabetes Medications:   Antihyperglycemics (From admission, onward)    Start     Stop Route Frequency Ordered    08/13/18 2115  insulin regular (Humulin R) 100 Units in sodium chloride 0.9% 100 mL infusion     Question:  Insulin rate changes (DO NOT MODIFY ANSWER)  Answer:  [email or web address]    -- IV Continuous 08/13/18 2107

## 2018-08-15 NOTE — PT/OT/SLP EVAL
Physical Therapy Evaluation    Patient Name:  Anjum Campbell   MRN:  [id number]    Recommendations:     Discharge Recommendations:  (no further PT will be needed.)   Discharge Equipment Recommendations: none   Barriers to discharge: None    Assessment:     Anjum Campbell is a 58 y.o. male admitted with a medical diagnosis of CAD (coronary artery disease).  He presents with the following impairments/functional limitations:  impaired endurance, gait instability, impaired functional mobilty pt ann treatment well and will benefit from skilled PT to address deficits and return pt to Independent status. Pt will be able to discharge home with no therapy or DME needs. Pt is s/p CABG, excision of mediastinal mass 8/13/18.    Rehab Prognosis:  good; patient would benefit from acute skilled PT services to address these deficits and reach maximum level of function.      Recent Surgery: Procedure(s) (LRB):  CABGx1 (N/A)  EXCISION, MASS, MEDIASTINUM 2 Days Post-Op    Plan:     During this hospitalization, patient to be seen 5 x/week to address the above listed problems via gait training, therapeutic activities  · Plan of Care Expires:  09/12/18   Plan of Care Reviewed with: patient    Subjective     Communicated with nurse prior to session.  Patient found sitting in chair upon PT entry to room, agreeable to evaluation.      Chief Complaint: pt c/o pain in chest during treatment.   Patient comments/goals: to get better and go home.   Pain/Comfort:  · Pain Rating 1: 6/10  · Location 1: (chest)  · Pain Rating Post-Intervention 1: 6/10    Patients cultural, spiritual, Advent conflicts given the current situation: none    Living Environment:  Pt works full-time as  and lives with his homemaker wife who can assist. They live in [address] [place] [address].   Prior to admission, patients level of function was independent.  Patient has the following equipment: none.  DME owned (not currently used): none.  Upon discharge,  patient will have assistance from wife..    Objective:     Patient found with: arterial line, telemetry, pulse ox (continuous), blood pressure cuff, fierro catheter, oxygen, chest tube, central line(external pacemaker)     General Precautions: Standard, sternal, fall   Orthopedic Precautions:    Braces:       Exams:  · Cognitive Exam:  Patient is oriented to Person, Place, Time and Situation  ·   · RLE ROM: WFL  · RLE Strength: WFL  · LLE ROM: WFL  · LLE Strength: WFL    Functional Mobility:  · Transfers:     · Sit to Stand:  contact guard assistance with hand-held assist  ·   · Gait: pt received gait training 5 ft x 2 reps forward and backward with CGA. Distance pt ambulated limited by medical lines.    AM-PAC 6 CLICK MOBILITY  Total Score:17       Therapeutic Activities and Exercises:   pt received verbal and written instructions in sternal precautions and verbally expressed understanding of such.     Patient left up in chair with all lines intact and call button in reach.    GOALS:   Multidisciplinary Problems     Physical Therapy Goals        Problem: Physical Therapy Goal    Goal Priority Disciplines Outcome Goal Variances Interventions   Physical Therapy Goal     PT, PT/OT      Description:  Goals to be met by: 18    Patient will increase functional independence with mobility by performin. Supine to sit with Stand-by Assistance -not met  2. Sit to stand transfer with Supervision-not met  3. Gait  x 250 feet with Supervision -not met                      History:     Past Medical History:   Diagnosis Date    Back injury     Chest pain     CHF (congestive heart failure)     Hypertension        Past Surgical History:   Procedure Laterality Date    back fusion       Lumber surgery         Clinical Decision Making:     History  Co-morbidities and personal factors that may impact the plan of care Examination  Body Structures and Functions, activity limitations and participation  restrictions that may impact the plan of care Clinical Presentation   Decision Making/ Complexity Score   Co-morbidities:   [] Time since onset of injury / illness / exacerbation  [] Status of current condition  []Patient's cognitive status and safety concerns    [] Multiple Medical Problems (see med hx)  Personal Factors:   [] Patient's age  [] Prior Level of function   [] Patient's home situation (environment and family support)  [] Patient's level of motivation  [] Expected progression of patient      HISTORY:(criteria)    [] 32025 - no personal factors/history    [] 22488 - has 1-2 personal factor/comorbidity     [] 68514 - has >3 personal factor/comorbidity     Body Regions:  [] Objective examination findings  [] Head     []  Neck  [] Trunk   [] Upper Extremity  [] Lower Extremity    Body Systems:  [] For communication ability, affect, cognition, language, and learning style: the assessment of the ability to make needs known, consciousness, orientation (person, place, and time), expected emotional /behavioral responses, and learning preferences (eg, learning barriers, education  needs)  [] For the neuromuscular system: a general assessment of gross coordinated movement (eg, balance, gait, locomotion, transfers, and transitions) and motor function  (motor control and motor learning)  [] For the musculoskeletal system: the assessment of gross symmetry, gross range of motion, gross strength, height, and weight  [] For the integumentary system: the assessment of pliability(texture), presence of scar formation, skin color, and skin integrity  [] For cardiovascular/pulmonary system: the assessment of heart rate, respiratory rate, blood pressure, and edema     Activity limitations:    [] Patient's cognitive status and saf ety concerns          [] Status of current condition      [] Weight bearing restriction  [] Cardiopulmunary Restriction    Participation Restrictions:   [] Goals and goal agreement with the patient      [] Rehab potential (prognosis) and probable outcome      Examination of Body System: (criteria)    [] 54586 - addressing 1-2 elements    [] 45812 - addressing a total of 3 or more elements     [] 67344 -  Addressing a total of 4 or more elements         Clinical Presentation: (criteria)  Choose one     On examination of body system using standardized tests and measures patient presents with (CHOOSE ONE) elements from any of the following: body structures and functions, activity limitations, and/or participation restrictions.  Leading to a clinical presentation that is considered (CHOOSE ONE)                              Clinical Decision Making  (Eval Complexity):  Choose One     Time Tracking:     PT Received On: 08/15/18  PT Start Time: 0811     PT Stop Time: 0822  PT Total Time (min): 11 min     Billable Minutes: Evaluation 11 min      Ingrid Milton, PT  08/15/2018

## 2018-08-15 NOTE — PLAN OF CARE
Problem: Patient Care Overview  Goal: Plan of Care Review  Outcome: Ongoing (interventions implemented as appropriate)  Patient is alert and oriented x 3 , MAEW, but c/o generalized chest and abdominal pain 8/10 early in the shift ; administered oxycodone 10 mg as ordered ; pain unrelieved by p.o. Medication ; dilaudid 0.5 mg ordered and administered with adequate pain relief achieved; pain remained controlled throughout the shift with one 5 mg dose of oxycodone being adequate toward the end of the shift; around mid-shift the pt began having some shortness of breath , requiring titration of O2 with sats in the low 90's, and a CVP of 18 was recorded; lasix 40 mg x 1 dose was administered with 6919-6928 mL UOP, and improvement in respiratory status and CVP ; NC at 3-4 L/min with CVP at 13; drips include: epi at .04 mcg/kg/min to maintain MAP at 70, nitro at 0.2 mcg/kg/min, insulin drip titrated per protocol /accucheck q 1 hr, vaso drip turned off at 0000. Chest tube output appropriate.

## 2018-08-15 NOTE — PT/OT/SLP EVAL
Occupational Therapy   Co-Evaluation    Name: Anjum Campbell  MRN: [id number]  Admitting Diagnosis:  CAD (coronary artery disease) 2 Days Post-Op    Recommendations:     Discharge Recommendations: home  Discharge Equipment Recommendations:  none  Barriers to discharge:   none     History:     Occupational Profile:  Living Environment: Pt lives with spouse in a H with 0STE. Bathroom has a tub/shower combo.   Previous level of function: PTA, pt was independent with functional mobility and ADLs. He was working full time a s a  and has not driven in a few weeks.  Roles and Routines: Pt is a spouse and full time employee.   Equipment Used at Home:  none  Assistance upon Discharge: Pt's spouse is home and able to assist as needed at d/c.     Past Medical History:   Diagnosis Date    Back injury 1994    Chest pain     CHF (congestive heart failure)     Hypertension        Past Surgical History:   Procedure Laterality Date    back fusion   1995    Lumber surgery  1995       Subjective     Chief Complaint: pain in sternal incision site  Patient/Family Comments/goals: return home     Pain/Comfort:  · Pain Rating 1: 6/10(at sternal incision site )  · Pain Addressed 1: Reposition, Distraction(pt pressing PCA pump twice in session )    Patients cultural, spiritual, Jain conflicts given the current situation: none reported     Objective:     Communicated with: RN prior to session. Pt agreeable to therapy evaluation. Patient found all lines intact and call button in reach and arterial line, blood pressure cuff, central line, chest tube, fierro catheter, oxygen, PCA, pulse ox (continuous), telemetry, peripheral IV(external pacemaker; chest tube x2) upon OT entry to room.    General Precautions: Standard, fall, sternal   Orthopedic Precautions:N/A   Braces: N/A     Occupational Performance:    Bed Mobility:    · NT as pt found seated UIC at start of session    Functional Mobility/Transfers:  · Sit<>stand:  CGA  · From/onto bedside chair x1 trial w/o the use of any AD  · Min vc's to maintain sternal precautions   · Functional Mobility: Pt engaging in functional mobility to simulate household distances approx 5ft and 2 trials forwards and back with CGA and HHA in order to assess functional activity tolerance and standing balance required for engagement in occupations of choice.     Activities of Daily Living:  · UB Dressing: max A   · Limited shoulder ROM d/t multiple lines and sore musculature    · LB Dressing: max A   · Pt unable to access BLE's in prep for donning socks   · Grooming: setup assistance  · Seated in bedside chair to wipe face with cloth and with ability to access face with BUE's    Cognitive/Visual Perceptual:  · Pt alert and oriented x4  · Pt with good insight into condition and with good motivation to engage in therapy session  · Pt able to follow multi-step commands 100% of the time  · Effective verbal communication    · Intact short and long memory  · Intact safety awareness  · No visual deficits present    Physical Exam:  Balance:  · Sitting: independent sitting supported in bedside chair   · Standing: CGA-min A   Postural Examination: no deviations noted   Skin Integrity: intact   Edema: none noted   Sensation: intact to light touch   UE ROM:  · Right: not formally assessed, however limited by multiple lines   · Left: not formally assessed, however limited by multiple lines   UE Strength:  · Right: unable to assess 2/2 sternal precautions   · Left: unable to assess 2/2 sternal precautions    Strength:  · Right: WFL  · Left: WFL  Fine Motor Control: WFL  Gross Motor Control: WFL    AMPAC 6 Click ADL:  AMPAC Total Score: 15    Treatment & Education:  · Communicated OT POC  · Updated communication board   · Educated on sternal precautions and importance of maintining within scope of practice; PT provided pt with sternal precautions handout   · Educated on importance of OOB mobility, sitting UIC,  "and engagement in ADLs  Education:    Patient left up in chair with all lines intact, call button in reach and RN notified    Assessment:     Anjum Campbell is a 58 y.o. male with a medical diagnosis of CAD (coronary artery disease).  He presents with the following performance deficits affecting function: weakness, impaired functional mobilty, decreased safety awareness, impaired cardiopulmonary response to activity, impaired endurance, impaired balance, impaired self care skills, decreased lower extremity function. Pt p/w decreased independence with functional mobility and ADLs d/t need for maintaining sternal precautions, impaired ROM in BUE's d/t stiffness, chest pain, max A for LB dressing/UB dressing, setup assistance for seated grooming, and CGA HHA for functional mobility short distances. Pt would benefit from continued acute OT services to maximize independence.       Rehab Prognosis: Good; patient would benefit from acute skilled OT services to address these deficits and reach maximum level of function.       Clinical Decision Makin.  OT Low:  "Pt evaluation falls under low complexity for evaluation coding due to performance deficits noted in 1-3 areas as stated above and 0 co-morbities affecting current functional status. Data obtained from problem focused assessments. No modifications or assistance was required for completion of evaluation. Only brief occupational profile and history review completed."     Plan:     Patient to be seen 5 x/week to address the above listed problems via self-care/home management, therapeutic activities, therapeutic exercises  · Plan of Care Expires: 18  · Plan of Care Reviewed with: patient    This Plan of care has been discussed with the patient who was involved in its development and understands and is in agreement with the identified goals and treatment plan    GOALS:   Multidisciplinary Problems     Occupational Therapy Goals        Problem: " Occupational Therapy Goal    Goal Priority Disciplines Outcome Interventions   Occupational Therapy Goal     OT, PT/OT Ongoing (interventions implemented as appropriate)    Description:  Goals to be met by: 8/29/2018    Pt will complete LB dressing with supervision.  Pt will complete UB dressing with independence.  Pt will complete toilet transfer with supervision.  Pt will verbalize 3/3 sternal precautions with <2 vc's in prep for safe toilet transfer.   Pt will complete supine with HOB flat to seated at EOB with SBA in prep for seated grooming task.  Pt will engage in functional mobility to simulate household and community mobility distances with SBA in order to maximize functional activity tolerance required for engagement in occupations of choice.                     Time Tracking:     OT Date of Treatment: 08/15/18  OT Start Time: 0805  OT Stop Time: 0819  OT Total Time (min): 14 min    Billable Minutes:Evaluation 14 minutes    Guillremina Eden OT  8/15/2018

## 2018-08-15 NOTE — PLAN OF CARE
Problem: Occupational Therapy Goal  Goal: Occupational Therapy Goal  Goals to be met by: 8/29/2018    Pt will complete LB dressing with supervision.  Pt will complete UB dressing with independence.  Pt will complete toilet transfer with supervision.  Pt will verbalize 3/3 sternal precautions with <2 vc's in prep for safe toilet transfer.   Pt will complete supine with HOB flat to seated at EOB with SBA in prep for seated grooming task.  Pt will engage in functional mobility to simulate household and community mobility distances with SBA in order to maximize functional activity tolerance required for engagement in occupations of choice.   Outcome: Ongoing (interventions implemented as appropriate)  OT evaluation completed and POC initiated 8/15/2018.

## 2018-08-15 NOTE — ASSESSMENT & PLAN NOTE
58 year old male s/p CABG (LIMA to LAD) and resection of mediastinal mass 8/13/18    Neuro:   - Sedation: None  - Pain control: Oxycodone, Dilaudid PRN     Pulmonary:   - Extubated  - Daily CXRs  - CTs to suction   - Duonebs PRN  - OOBTC     Cardiac:  - Drips: epi, levo, vaso weaned off  - D/C nitro gtt  - MAP goal >70mmHg   - D/C lines     Renal:   - BUN/CR normalized  - Start lasix 20mg BID  - Keep fierro  - Trend BMP     Infectious Disease:   - Marcelle-op ancef x48 hours  - Trend WBC     Hematology/Oncology:  - Drop in H/H; recheck this afternoon  - Coags normal   - Trend CBC     Endocrine:  - Insulin gtt; transition to SSI     Fluids/Electrolytes/Nutrition/GI:   - Cardiac diet  - ADAT once extubated  - Trend CMP  - Replace Lytes PRN      Dispo:  Stepdown to CTSU.   Primary team: CTS

## 2018-08-15 NOTE — NURSING TRANSFER
Nursing Transfer Note      8/15/2018     Transfer To: [id number]    Transfer via wheelchair    Transfer with 5 L to O2, cardiac monitoring    Transported by 2 RNs    Medicines sent: yes    Chart send with patient: Yes    Notified: spouse at bedside    Patient reassessed at: 8/15/2018 1635    Upon arrival to floor: cardiac monitor applied, patient oriented to room, call bell in reach. Pt tolerated transport well, up in chair, gave report to Rene. Questions and concerns addressed.

## 2018-08-15 NOTE — PROGRESS NOTES
Progress Note  Surgical Intensive Care    Admit Date: 8/13/2018  LOS: 2   Post-operative Day: 2 Days Post-Op  Hospital Day: 3    Code Status: Full Code     SUBJECTIVE:     Follow-up For:    Procedure(s) (LRB):  CABGx1 (N/A)  EXCISION, MASS, MEDIASTINUM    CAD (coronary artery disease)      HPI:  Mr. Campbell is a 58-year-old male with a past medical history of chronic systolic CHF, CAD, HTN. Heart cath on 6/28/18 showed two vessel coronary artery disease involving mainly proximal LAD and distal small non-dominant left circumflex. His most recent echo on 6/22/18 showed moderately-severely depressed LV systolic function (EF 30%), LV global hypokinesis with lateral wall motion abnormality, and grade 1 diastolic dysfunction. He smokes 1 ppd. 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 His EBL was 100 cc, he did not require any blood products in the OR, and he was transported from the OR intubated.        Interval history:  Extubated to 2L NC yesterday. On epi 0.04mcg/kg/min, nitro 0.2mcg/kg/min. Vaso turned off over night. Off levo. No complaints this AM. OOBTC this morning.    Continuous Infusions:    dextrose 5 % and 0.45 % NaCl with KCl 20 mEq Stopped (08/15/18 0100)    epinephrine infusion 0.04 mcg/kg/min (08/15/18 0600)    insulin (HUMAN R) infusion (adults) Stopped (08/15/18 0615)    nitroGLYCERIN 16.06 mcg/min (08/15/18 0600)       Scheduled Meds:    albuterol-ipratropium  3 mL Nebulization Q4H    aspirin  325 mg Oral Daily    ceFAZolin (ANCEF) IVPB  2 g Intravenous Q8H    docusate sodium  100 mg Oral BID    furosemide        lidocaine (PF) 10 mg/ml (1%)  1 mL Injection Once    mupirocin  1 g Nasal BID    pantoprazole 40 mg in dextrose 5 % 100 mL IVPB (over 15 minutes) (ready to mix system)  40 mg Intravenous Daily    polyethylene glycol  17 g Oral Daily    sodium chloride 0.9%  3 mL Intravenous Q8H       PRN Meds:  acetaminophen, bisacodyl, dextrose 50%, dextrose 50%, HYDROmorphone, magnesium sulfate IVPB, magnesium sulfate IVPB, metoclopramide HCl, ondansetron, oxyCODONE, oxyCODONE, potassium chloride in water **AND** potassium chloride in water **AND** potassium chloride in water, sodium phosphate IVPB, sodium phosphate IVPB, sodium phosphate IVPB    Review of patient's allergies indicates:  No Known Allergies    OBJECTIVE:     Vital Signs (Most Recent)  Temp: 97.8 °F (36.6 °C) (08/15/18 0300)  Pulse: 100 (08/15/18 0615)  Resp: (!) 133 (08/15/18 0615)  BP: (!) 112/57 (08/15/18 0530)  SpO2: 95 % (08/15/18 0615)    Vital Signs Range (Last 24H):  Temp:  [97.7 °F (36.5 °C)-98.9 °F (37.2 °C)]   Pulse:  []   Resp:  []   BP: (103-134)/(56-75)   SpO2:  [90 %-100 %]   Arterial Line BP: (102-153)/(45-86)     I&O (Last 24H):    Intake/Output Summary (Last 24 hours) at 8/15/2018 0626  Last data filed at 8/15/2018 0615  Gross per 24 hour   Intake 1516.65 ml   Output 3561 ml   Net -2044.35 ml       Ventilator Data (Last 24H):     Vent Mode: Spont  Oxygen Concentration (%):  [] 40  Resp Rate Total:  [16 br/min-44 br/min] 23 br/min  Vt Set:  [500 mL] 500 mL  PEEP/CPAP:  [5 cmH20] 5 cmH20  Pressure Support:  [5 cmH20-10 cmH20] 5 cmH20  Mean Airway Pressure:  [7.1 cmH20-10 cmH20] 7.1 cmH20    Hemodynamic Parameters (Last 24H):  CO:  [5.8 L/min-8.8 L/min] 8.8 L/min  CI:  [2.9 L/min/m2-4.3 L/min/m2] 4.3 L/min/m2    Physical Exam:  Constitutional: He appears well-developed and well-nourished. No distress.  HEENT: Normocephalic and atraumatic. EOMI.  Cardiovascular: Normal rate and regular rhythm.Exam reveals friction rub.   Pulmonary/Chest: Effort normal and breath sounds normal. No stridor. No respiratory distress. He has no wheezes. He has no rales.   Right and left mediastinal chest tubes in place draining sanguinous fluid, Anterior pleural chest tube in place draining sanguinous fluid.   Abdominal: Soft. Bowel sounds are  normal. He exhibits no distension and no mass. There is no tenderness. There is no rebound and no guarding.   MSK: Non edematous  Skin: Warm and dry.  Neuro: Alert and oriented.      Lines/Drains:       Percutaneous Central Line Insertion/Assessment - Quad lumen  08/13/18 1331 right internal jugular (Active)   Number of days: 1            Percutaneous Central Line Insertion/Assessment - quad lumen  08/13/18 1331 right internal jugular;other (see comments) (Active)   Dressing dressing dry and intact 8/15/2018  3:00 AM   Securement secured w/ sutures 8/14/2018  7:00 PM   Additional Site Signs no erythema;no warmth;no edema;no pain;no streak formation 8/14/2018  7:00 PM   Distal Patency/Care infusing 8/14/2018  7:00 PM   Medial 1 Patency/Care infusing 8/14/2018  7:00 PM   Medial 2 Patency/Care infusing 8/14/2018  7:00 PM   Proximal Patency/Care infusing 8/14/2018  7:00 PM   Waveform normal 8/14/2018  7:00 PM   Line Interventions line leveled/zeroed 8/14/2018  7:00 PM   Dressing Change Due 08/19/18 8/14/2018  7:00 PM   Daily Line Review Performed 8/14/2018  7:00 PM   Number of days: 1            Peripheral IV - Single Lumen 08/13/18 1230 Left Forearm (Active)   Site Assessment Clean;Dry;Intact;No redness;No swelling 8/15/2018  3:00 AM   Line Status Flushed 8/14/2018  7:00 PM   Dressing Status Clean;Dry;Intact 8/14/2018 11:00 PM   Dressing Intervention New dressing 8/14/2018  3:15 AM   Dressing Change Due 08/17/18 8/14/2018  7:00 PM   Site Change Due 08/17/18 8/14/2018  7:01 AM   Reason Not Rotated Not due 8/14/2018  7:00 PM   Number of days: 1            Arterial Line 08/14/18 1600 Right Radial (Active)   Site Assessment Clean;Dry;Intact;No redness;No swelling 8/15/2018  3:00 AM   Line Status Pulsatile blood flow 8/15/2018  3:00 AM   Art Line Waveform Appropriate 8/15/2018  3:00 AM   Arterial Line Interventions Leveled 8/15/2018  3:00 AM   Color/Movement/Sensation Capillary refill less than 3 sec 8/15/2018  3:00 AM    Dressing Type Transparent 8/14/2018 11:00 PM   Dressing Status Biopatch in place;Clean;Dry;Intact 8/15/2018  3:00 AM   Number of days: 0            Pacer Wires 08/13/18 1900 (Active)   Pacer Wire Status Ventricular wires connected to pacer 8/14/2018 11:00 PM   Site Assessment Dry;Clean 8/14/2018  7:00 PM   How Pacer Wires are Secured Ventricular wires secured to dressing 8/14/2018  7:00 PM   Dressing Status Clean;Dry 8/14/2018 11:00 PM   Dressing Intervention Dressing reinforced 8/14/2018  3:15 AM   Number of days: 1            Chest Tube 08/13/18 1936 1 Anterior Pleural 19 Fr. (Active)   Chest Tube WDL WDL 8/15/2018  3:00 AM   Function -20 cm H2O 8/14/2018  7:00 PM   Air Leak/Fluctuation air leak not present;fluctuation not present;dependent drainage cleared;connections tightened 8/14/2018  7:00 PM   Safety all connections secured 8/14/2018 11:00 PM   Securement tubing anchored to body distal to insertion site w/ tape 8/14/2018  7:00 PM   Patency Intervention Stripped 8/14/2018 11:00 PM   Drainage Description Serosanguineous 8/14/2018 11:00 PM   Dressing Appearance clean and dry 8/15/2018  3:00 AM   Dressing Care dressing reinforced 8/14/2018  3:15 AM   Left Subcutaneous Emphysema none present 8/14/2018 11:00 PM   Right Subcutaneous Emphysema none present 8/14/2018 11:00 PM   Site Assessment Clean;Dry;Intact 8/14/2018 11:00 PM   Surrounding Skin Unable to view 8/14/2018 11:00 PM   Output (mL) 10 mL 8/15/2018  3:00 AM   Number of days: 1            Urethral Catheter 08/13/18 1513 Temperature probe 14 Fr. (Active)   Site Assessment Clean;Intact 8/15/2018  3:00 AM   Collection Container Urimeter 8/15/2018  3:00 AM   Securement Method secured to top of thigh w/ adhesive device 8/14/2018 11:00 PM   Catheter Care Performed yes 8/15/2018  3:00 AM   Reason for Continuing Urinary Catheterization Critically ill in ICU requiring intensive monitoring;Post operative 8/14/2018 11:00 PM   CAUTI Prevention Bundle StatLock in  "place w 1" slack;Intact seal between catheter & drainage tubing;Drainage bag off the floor;Green sheeting clip in use;No dependent loops or kinks;Drainage bag not overfilled (<2/3 full);Drainage bag below bladder 8/14/2018 11:00 AM   Output (mL) 130 mL 8/15/2018  6:00 AM   Number of days: 1            Y Chest Tube 1 and 2 08/13/18 1927 1 Right Mediastinal 19 Fr. 2 Left Mediastinal 19 Fr. (Active)   Function -20 cm H2O 8/14/2018 11:00 PM   Air Leak/Fluctuation air leak not present 8/14/2018 11:00 PM   Safety all connections secured 8/14/2018 11:00 PM   Securement tubing anchored to body distal to insertion site w/ tape 8/14/2018 11:00 PM   Left Subcutaneous Emphysema none present 8/14/2018 11:00 PM   Right Subcutaneous Emphysema none present 8/14/2018 11:00 PM   Patency Intervention Stripped 8/14/2018 11:00 PM   Drainage Description 1 Serosanguineous 8/14/2018  7:00 PM   Tube 1 Dressing Appearance clean and dry 8/15/2018  3:00 AM   Site Assessment 1 Not assessed 8/14/2018  7:00 PM   Surrounding Skin 1 Unable to view 8/14/2018  7:00 PM   Drainage Description 2 Serosanguineous 8/14/2018  7:00 PM   Tube 2 Dressing Appearance clean and dry 8/15/2018  3:00 AM   Site Assessment 2 Not assessed 8/14/2018  7:00 PM   Surrounding Skin Unable to view 8/14/2018  7:00 PM   Output (mL) 20 mL 8/15/2018  3:00 AM   Number of days: 1       Laboratory (Last 24H):  ABG:   Recent Labs   Lab  08/14/18   1237   PH  7.438   PO2  83   PCO2  23.2*   HCO3  15.7*   POCSATURATED  97   BE  -9     BMP:  Recent Labs   Lab  08/15/18   0300   NA  140   K  4.0   CL  103   CO2  28   BUN  21*   CREATININE  0.9   GLU  134*   MG  2.3   PHOS  3.0     LFT:   Lab Results   Component Value Date    AST 16 08/09/2018    ALT 25 08/09/2018    ALKPHOS 72 08/09/2018    BILITOT 0.7 08/09/2018    ALBUMIN 4.2 08/09/2018    PROT 7.5 08/09/2018     CBC:   Lab Results   Component Value Date    WBC 21.47 (H) 08/15/2018    HGB 9.4 (L) 08/15/2018    HCT 28.0 (L) 08/15/2018 "    MCV 89 08/15/2018     08/15/2018     Microbiology x 7d:   Microbiology Results (last 7 days)     ** No results found for the last 168 hours. **           Nutrition/Tube Feeds:   Current Diet Order   Procedures    Diet NPO Except for: Sips with Medication     Order Specific Question:   Except for     Answer:   Sips with Medication       Imaging/Diagnostic Results:      ASSESSMENT/PLAN:   58 y.o. 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 POD 2.    Neuro:  -Extubated  -Pain control: PRN acetaminophen, oxycodone, fentanyl  -Alert and oriented     Pulmonary:   -ICS  -Duonebs     Cardiac:  -S/p CABG x1 for which LAD was only bypassable lesion 8/13  -MAP goal >70 to increase coronary perfusion  -Drips: Epi to 0.06 mcg/kg/min, Nitroglycerin 0.2 mcg/kg/min  - mg qd     Renal:   -Wheeler in place  -Monitor BMP  -UOP 2930 in last 24hrs  -Received lasix 40mg x 1 yesterday for decreasing UOP  -BUN 21/.0.9     Fluids/Electrolytes/Nutrition/GI:   -Nutritional status: NPO, ADAT per CTS  -replace lytes PRN  -Fluids: D5 1/2 NS w/ KCl 20 mEq at 5 cc/hr  -Bowel regimen  -Prophylaxis: Pantoprazole     Hematology/Oncology:  -H/H 9.4/28   -PLTs 156  -Monitor CBC     Infectious Disease:   -Afebrile  -WBC 21  -Abx: Ancef surgical ppx  -Continue to monitor     Endocrine:  -Glucose goal per primary  -Insulin gtt     Prophylaxis:  -Pantoprazole     Dispo:  -Continue care in the ICU setting  -Primary: CTS  -PT/OT  -Wean vasopressors    Newton Jones DO  Critical Care Surgery

## 2018-08-15 NOTE — PROGRESS NOTES
"[facility]  Endocrinology  Progress Note    Admit Date: 8/13/2018     Reason for Consult: Management of Hyperglycemia     Surgical Procedure and Date: CABG on 8/13/18    HPI:   Patient is a 58 y.o. male with a diagnosis of CHF, CAD, and HTN. Recent heart cath on 6/28/18 with two vessel CAD involving primarly the proximal LAD and distal small non-dominant left circumflex per CTS note. Also with EF 30% on most recent echo. Patient is a smoker; 1 PPD. No personal history of DM. Endocrinology consulted for BG management.   Lab Results   Component Value Date    HGBA1C 5.9 (H) 08/09/2018                 Interval HPI:   Overnight events: remains in ICU, extubated yesterday afternoon. BG at or slightly below goal overnight. Insulin infusion 0.5-1.1 u/hr overnight. Insulin infusion suspended this AM with BG in 80s.   Eating:   NPO  Nausea: No  Hypoglycemia and intervention: No  Fever: No  TPN and/or TF: No    /60   Pulse 99   Temp 97.7 °F (36.5 °C) (Oral)   Resp (!) 99   Ht 5' 8" (1.727 m)   Wt 86.7 kg (191 lb 2.2 oz)   SpO2 96%   BMI 29.06 kg/m²      Labs Reviewed and Include    Recent Labs   Lab  08/15/18   0300   GLU  134*   CALCIUM  8.7   NA  140   K  4.0   CO2  28   CL  103   BUN  21*   CREATININE  0.9     Lab Results   Component Value Date    WBC 21.47 (H) 08/15/2018    HGB 9.4 (L) 08/15/2018    HCT 28.0 (L) 08/15/2018    MCV 89 08/15/2018     08/15/2018     No results for input(s): TSH, FREET4 in the last 168 hours.  Lab Results   Component Value Date    HGBA1C 5.9 (H) 08/09/2018       Nutritional status:   Body mass index is 29.06 kg/m².  Lab Results   Component Value Date    ALBUMIN 4.2 08/09/2018    ALBUMIN 4.1 06/20/2018     No results found for: PREALBUMIN    Estimated Creatinine Clearance: 98.1 mL/min (based on SCr of 0.9 mg/dL).    Accu-Checks  Recent Labs      08/15/18   0058  08/15/18   0207  08/15/18   0307  08/15/18   0405  08/15/18   0501  08/15/18   0612  08/15/18   " 0614  08/15/18   0635  08/15/18   0735  08/15/18   0823   POCTGLUCOSE  141*  134*  144*  130*  131*  87  85  145*  138*  86       Current Medications and/or Treatments Impacting Glycemic Control  Immunotherapy:    Immunosuppressants     None        Steroids:   Hormones (From admission, onward)    None        Pressors:    Autonomic Drugs (From admission, onward)    Start     Stop Route Frequency Ordered    08/14/18 1135  EPINEPHrine (ADRENALIN) 1 mg/mL (1 mL) injection     Comments:  Created by cabinet override    08/14 2344 08/14/18 1135 08/13/18 2115  EPINEPHrine (ADRENALIN) 4 mg in sodium chloride 0.9% 250 mL infusion     Question Answer Comment   Titrate by: (in mcg/kg/min) 0.02    Titrate interval: (in minutes) 5    Titrate to maintain: (SBP or MAP or Cardiac Index) MAP    Greater than: (in mmHg) 65    Cardiac index greater than: (in L/min) 2.2    Maximum dose: (in mcg/kg/min) 2        -- IV Continuous 08/13/18 2107        Hyperglycemia/Diabetes Medications:   Antihyperglycemics (From admission, onward)    Start     Stop Route Frequency Ordered    08/13/18 2115  insulin regular (Humulin R) 100 Units in sodium chloride 0.9% 100 mL infusion     Question:  Insulin rate changes (DO NOT MODIFY ANSWER)  Answer:  [email or web address]\[facility] [email or web address]    -- IV Continuous 08/13/18 2107          ASSESSMENT and PLAN    * CAD (coronary artery disease)    S/p CABG.   Managed per CTS.           Stress hyperglycemia    BG goal 110-140  [facility] protocol    Change bg monitoring to ac/hs as diet to advance.   Start moderate dose correction scale.   If BG trending up off insulin infusion; will restart transition insulin infusion.     Discharge plans- TBD.           S/P CABG (coronary artery bypass graft)    Managed per primary.   Optimize bg control.               Constanza Arreaga NP  Endocrinology  [facility]

## 2018-08-15 NOTE — SUBJECTIVE & OBJECTIVE
Interval History: Extubated yesterday to NC. No acute events overnight. Epi, vaso, levo weaned off. Sitting in chair this AM    Medications:  Continuous Infusions:   dextrose 5 % and 0.45 % NaCl with KCl 20 mEq Stopped (08/15/18 0100)    insulin (HUMAN R) infusion (adults) Stopped (08/15/18 0615)     Scheduled Meds:   albuterol-ipratropium  3 mL Nebulization Q4H    aspirin  325 mg Oral Daily    docusate sodium  100 mg Oral BID    furosemide        furosemide  20 mg Intravenous BID    mupirocin  1 g Nasal BID    [START ON 8/16/2018] pantoprazole  40 mg Oral Daily    polyethylene glycol  17 g Oral Daily    sodium chloride 0.9%  3 mL Intravenous Q8H     PRN Meds:acetaminophen, bisacodyl, dextrose 50%, dextrose 50%, HYDROmorphone, magnesium sulfate IVPB, magnesium sulfate IVPB, metoclopramide HCl, ondansetron, oxyCODONE, oxyCODONE, potassium chloride in water **AND** [DISCONTINUED] potassium chloride in water **AND** [DISCONTINUED] potassium chloride in water     Objective:     Vital Signs (Most Recent):  Temp: 97.7 °F (36.5 °C) (08/15/18 0700)  Pulse: 99 (08/15/18 0815)  Resp: (!) 99 (08/15/18 0815)  BP: 130/60 (08/15/18 0815)  SpO2: 96 % (08/15/18 0815) Vital Signs (24h Range):  Temp:  [97.7 °F (36.5 °C)-98.9 °F (37.2 °C)] 97.7 °F (36.5 °C)  Pulse:  [] 99  Resp:  [] 99  SpO2:  [90 %-100 %] 96 %  BP: (103-130)/(56-68) 130/60  Arterial Line BP: (102-153)/(45-86) 132/57     Weight: 86.7 kg (191 lb 2.2 oz)  Body mass index is 29.06 kg/m².    SpO2: 96 %  O2 Device (Oxygen Therapy): nasal cannula    Intake/Output - Last 3 Shifts     ** Patient Encounter Information Not Found **          Lines/Drains/Airways     Central Venous Catheter Line                 Percutaneous Central Line Insertion/Assessment - Quad lumen  08/13/18 1331 right internal jugular 1 day         Percutaneous Central Line Insertion/Assessment - quad lumen  08/13/18 1331 right internal jugular;other (see comments) 1 day           Drain                 Chest Tube 08/13/18 1936 1 Anterior Pleural 19 Fr. 1 day         Urethral Catheter 08/13/18 1513 Temperature probe 14 Fr. 1 day         Y Chest Tube 1 and 2 08/13/18 1927 1 Right Mediastinal 19 Fr. 2 Left Mediastinal 19 Fr. 1 day          Epidural Line                 Perineural Analgesia/Anesthesia Assessment (using dermatomes) Epidural 08/13/18 1520 1 day          Arterial Line                 Arterial Line 08/14/18 1600 Right Radial less than 1 day          Line                 Pacer Wires 08/13/18 1900 1 day          Peripheral Intravenous Line                 Peripheral IV - Single Lumen 08/13/18 1230 Left Forearm 1 day                Physical Exam   Constitutional: He appears well-developed and well-nourished. No distress.   HENT:   Head: Normocephalic and atraumatic.   Eyes: EOM are normal.   Neck: Neck supple.   Cardiovascular: Normal rate and regular rhythm. Exam reveals no gallop and no friction rub.   No murmur heard.  Sternal incision c/d/i. CTs with SS output   Pulmonary/Chest: Effort normal and breath sounds normal. No stridor. No respiratory distress. He has no wheezes. He has no rales.   Abdominal: Soft. Bowel sounds are normal.   Genitourinary:   Genitourinary Comments: Right and left mediastinal chest tubes in place draining sanguinous fluid, Anterior pleural chest tube in place draining sanguinous fluid   Neurological:   Sedated   Skin: Skin is warm and dry.   Psychiatric:   Sedated   Nursing note and vitals reviewed.      Significant Labs:  ABGs:   Recent Labs   Lab  08/14/18   1237   PH  7.438   PCO2  23.2*   PO2  83   HCO3  15.7*   POCSATURATED  97   BE  -9     CBC:   Recent Labs   Lab  08/15/18   0300   WBC  21.47*   RBC  3.14*   HGB  9.4*   HCT  28.0*   PLT  156   MCV  89   MCH  29.9   MCHC  33.6     CMP:   Recent Labs   Lab  08/15/18   0300   GLU  134*   CALCIUM  8.7   NA  140   K  4.0   CO2  28   CL  103   BUN  21*   CREATININE  0.9     Coagulation:   Recent Labs   Lab   08/14/18   0307   INR  1.0   APTT  <21.0     Lactic Acid: No results for input(s): LACTATE in the last 48 hours.  LFTs: No results for input(s): ALT, AST, ALKPHOS, BILITOT, PROT, ALBUMIN in the last 48 hours.    Significant Diagnostics:  I have reviewed all pertinent imaging results/findings within the past 24 hours.

## 2018-08-15 NOTE — PLAN OF CARE
Pt VSS throughout shift, MAPs maintained above 70, o2 sats>95% on 2L NC. Pt extubated today, tolerated well. Pt passed swallow study, tolerated well.  Right radial a-line placed by Dr. Keene, left radial a-line d/c'd due to poor waveform. Pt weaned off precedex and levo. Pt remains on nitro, vaso, epi, and insulin gtts. Family at bedside most of the day, all questions answered per CTS team and Dr. Payne. Will continue to monitor pt. See flowsheet for additional info.

## 2018-08-16 LAB
ANION GAP SERPL CALC-SCNC: 7 MMOL/L
APTT BLDCRRT: 22.9 SEC
BASOPHILS # BLD AUTO: 0.05 K/UL
BASOPHILS # BLD AUTO: 0.05 K/UL
BASOPHILS NFR BLD: 0.3 %
BASOPHILS NFR BLD: 0.3 %
BUN SERPL-MCNC: 19 MG/DL
CALCIUM SERPL-MCNC: 9.3 MG/DL
CHLORIDE SERPL-SCNC: 98 MMOL/L
CO2 SERPL-SCNC: 34 MMOL/L
CREAT SERPL-MCNC: 0.7 MG/DL
DIFFERENTIAL METHOD: ABNORMAL
DIFFERENTIAL METHOD: ABNORMAL
EOSINOPHIL # BLD AUTO: 0 K/UL
EOSINOPHIL # BLD AUTO: 0 K/UL
EOSINOPHIL NFR BLD: 0.2 %
EOSINOPHIL NFR BLD: 0.2 %
ERYTHROCYTE [DISTWIDTH] IN BLOOD BY AUTOMATED COUNT: 13.5 %
ERYTHROCYTE [DISTWIDTH] IN BLOOD BY AUTOMATED COUNT: 13.5 %
EST. GFR  (AFRICAN AMERICAN): >60 ML/MIN/1.73 M^2
EST. GFR  (NON AFRICAN AMERICAN): >60 ML/MIN/1.73 M^2
GLUCOSE SERPL-MCNC: 113 MG/DL
HCT VFR BLD AUTO: 32 %
HCT VFR BLD AUTO: 32 %
HGB BLD-MCNC: 10.1 G/DL
HGB BLD-MCNC: 10.1 G/DL
IMM GRANULOCYTES # BLD AUTO: 0.14 K/UL
IMM GRANULOCYTES # BLD AUTO: 0.14 K/UL
IMM GRANULOCYTES NFR BLD AUTO: 0.8 %
IMM GRANULOCYTES NFR BLD AUTO: 0.8 %
LYMPHOCYTES # BLD AUTO: 1.8 K/UL
LYMPHOCYTES # BLD AUTO: 1.8 K/UL
LYMPHOCYTES NFR BLD: 10.8 %
LYMPHOCYTES NFR BLD: 10.8 %
MAGNESIUM SERPL-MCNC: 2.4 MG/DL
MCH RBC QN AUTO: 29.5 PG
MCH RBC QN AUTO: 29.5 PG
MCHC RBC AUTO-ENTMCNC: 31.6 G/DL
MCHC RBC AUTO-ENTMCNC: 31.6 G/DL
MCV RBC AUTO: 94 FL
MCV RBC AUTO: 94 FL
MONOCYTES # BLD AUTO: 1.5 K/UL
MONOCYTES # BLD AUTO: 1.5 K/UL
MONOCYTES NFR BLD: 9.1 %
MONOCYTES NFR BLD: 9.1 %
NEUTROPHILS # BLD AUTO: 13.2 K/UL
NEUTROPHILS # BLD AUTO: 13.2 K/UL
NEUTROPHILS NFR BLD: 78.8 %
NEUTROPHILS NFR BLD: 78.8 %
NRBC BLD-RTO: 0 /100 WBC
NRBC BLD-RTO: 0 /100 WBC
PHOSPHATE SERPL-MCNC: 2.3 MG/DL
PLATELET # BLD AUTO: 144 K/UL
PLATELET # BLD AUTO: 144 K/UL
PMV BLD AUTO: 10.4 FL
PMV BLD AUTO: 10.4 FL
POCT GLUCOSE: 113 MG/DL (ref 70–110)
POCT GLUCOSE: 115 MG/DL (ref 70–110)
POCT GLUCOSE: 179 MG/DL (ref 70–110)
POTASSIUM SERPL-SCNC: 4.4 MMOL/L
RBC # BLD AUTO: 3.42 M/UL
RBC # BLD AUTO: 3.42 M/UL
SODIUM SERPL-SCNC: 139 MMOL/L
WBC # BLD AUTO: 16.77 K/UL
WBC # BLD AUTO: 16.77 K/UL

## 2018-08-16 PROCEDURE — 85025 COMPLETE CBC W/AUTO DIFF WBC: CPT

## 2018-08-16 PROCEDURE — 36415 COLL VENOUS BLD VENIPUNCTURE: CPT

## 2018-08-16 PROCEDURE — 27000221 HC OXYGEN, UP TO 24 HOURS

## 2018-08-16 PROCEDURE — 25000242 PHARM REV CODE 250 ALT 637 W/ HCPCS: Performed by: NURSE PRACTITIONER

## 2018-08-16 PROCEDURE — 63600175 PHARM REV CODE 636 W HCPCS: Performed by: NURSE PRACTITIONER

## 2018-08-16 PROCEDURE — 84100 ASSAY OF PHOSPHORUS: CPT

## 2018-08-16 PROCEDURE — 25000003 PHARM REV CODE 250: Performed by: STUDENT IN AN ORGANIZED HEALTH CARE EDUCATION/TRAINING PROGRAM

## 2018-08-16 PROCEDURE — 25000003 PHARM REV CODE 250: Performed by: NURSE PRACTITIONER

## 2018-08-16 PROCEDURE — A4216 STERILE WATER/SALINE, 10 ML: HCPCS | Performed by: STUDENT IN AN ORGANIZED HEALTH CARE EDUCATION/TRAINING PROGRAM

## 2018-08-16 PROCEDURE — 63600175 PHARM REV CODE 636 W HCPCS: Performed by: THORACIC SURGERY (CARDIOTHORACIC VASCULAR SURGERY)

## 2018-08-16 PROCEDURE — 20600001 HC STEP DOWN PRIVATE ROOM

## 2018-08-16 PROCEDURE — 85730 THROMBOPLASTIN TIME PARTIAL: CPT

## 2018-08-16 PROCEDURE — 99231 SBSQ HOSP IP/OBS SF/LOW 25: CPT | Mod: ,,, | Performed by: NURSE PRACTITIONER

## 2018-08-16 PROCEDURE — 83735 ASSAY OF MAGNESIUM: CPT

## 2018-08-16 PROCEDURE — 97110 THERAPEUTIC EXERCISES: CPT

## 2018-08-16 PROCEDURE — 94761 N-INVAS EAR/PLS OXIMETRY MLT: CPT

## 2018-08-16 PROCEDURE — 80048 BASIC METABOLIC PNL TOTAL CA: CPT

## 2018-08-16 PROCEDURE — 97530 THERAPEUTIC ACTIVITIES: CPT

## 2018-08-16 PROCEDURE — 94640 AIRWAY INHALATION TREATMENT: CPT

## 2018-08-16 RX ORDER — IPRATROPIUM BROMIDE AND ALBUTEROL SULFATE 2.5; .5 MG/3ML; MG/3ML
3 SOLUTION RESPIRATORY (INHALATION) EVERY 4 HOURS
Status: DISCONTINUED | OUTPATIENT
Start: 2018-08-16 | End: 2018-08-18 | Stop reason: HOSPADM

## 2018-08-16 RX ORDER — METOPROLOL TARTRATE 25 MG/1
25 TABLET, FILM COATED ORAL 2 TIMES DAILY
Status: DISCONTINUED | OUTPATIENT
Start: 2018-08-16 | End: 2018-08-16

## 2018-08-16 RX ORDER — CARVEDILOL 12.5 MG/1
12.5 TABLET ORAL 2 TIMES DAILY WITH MEALS
Status: DISCONTINUED | OUTPATIENT
Start: 2018-08-16 | End: 2018-08-16

## 2018-08-16 RX ORDER — CARVEDILOL 6.25 MG/1
6.25 TABLET ORAL 2 TIMES DAILY WITH MEALS
Status: DISCONTINUED | OUTPATIENT
Start: 2018-08-16 | End: 2018-08-17

## 2018-08-16 RX ADMIN — POLYETHYLENE GLYCOL 3350 17 G: 17 POWDER, FOR SOLUTION ORAL at 08:08

## 2018-08-16 RX ADMIN — FUROSEMIDE 20 MG: 10 INJECTION, SOLUTION INTRAMUSCULAR; INTRAVENOUS at 08:08

## 2018-08-16 RX ADMIN — ASPIRIN 325 MG: 325 TABLET, DELAYED RELEASE ORAL at 08:08

## 2018-08-16 RX ADMIN — POTASSIUM CHLORIDE 20 MEQ: 1500 TABLET, EXTENDED RELEASE ORAL at 09:08

## 2018-08-16 RX ADMIN — MUPIROCIN 1 G: 20 OINTMENT TOPICAL at 08:08

## 2018-08-16 RX ADMIN — OXYCODONE HYDROCHLORIDE 10 MG: 5 TABLET ORAL at 01:08

## 2018-08-16 RX ADMIN — IPRATROPIUM BROMIDE AND ALBUTEROL SULFATE 3 ML: .5; 3 SOLUTION RESPIRATORY (INHALATION) at 04:08

## 2018-08-16 RX ADMIN — Medication 3 ML: at 09:08

## 2018-08-16 RX ADMIN — IPRATROPIUM BROMIDE AND ALBUTEROL SULFATE 3 ML: .5; 3 SOLUTION RESPIRATORY (INHALATION) at 11:08

## 2018-08-16 RX ADMIN — OXYCODONE HYDROCHLORIDE 10 MG: 5 TABLET ORAL at 06:08

## 2018-08-16 RX ADMIN — Medication 3 ML: at 05:08

## 2018-08-16 RX ADMIN — MUPIROCIN 1 G: 20 OINTMENT TOPICAL at 09:08

## 2018-08-16 RX ADMIN — FUROSEMIDE 20 MG: 10 INJECTION, SOLUTION INTRAMUSCULAR; INTRAVENOUS at 05:08

## 2018-08-16 RX ADMIN — IPRATROPIUM BROMIDE AND ALBUTEROL SULFATE 3 ML: .5; 3 SOLUTION RESPIRATORY (INHALATION) at 08:08

## 2018-08-16 RX ADMIN — INSULIN ASPART 2 UNITS: 100 INJECTION, SOLUTION INTRAVENOUS; SUBCUTANEOUS at 08:08

## 2018-08-16 RX ADMIN — CARVEDILOL 6.25 MG: 6.25 TABLET, FILM COATED ORAL at 05:08

## 2018-08-16 RX ADMIN — Medication 3 ML: at 02:08

## 2018-08-16 RX ADMIN — ATORVASTATIN CALCIUM 40 MG: 20 TABLET, FILM COATED ORAL at 09:08

## 2018-08-16 RX ADMIN — DOCUSATE SODIUM 100 MG: 100 CAPSULE, LIQUID FILLED ORAL at 09:08

## 2018-08-16 RX ADMIN — PANTOPRAZOLE SODIUM 40 MG: 40 TABLET, DELAYED RELEASE ORAL at 05:08

## 2018-08-16 RX ADMIN — ACETAMINOPHEN 650 MG: 325 TABLET, FILM COATED ORAL at 09:08

## 2018-08-16 RX ADMIN — POTASSIUM CHLORIDE 20 MEQ: 1500 TABLET, EXTENDED RELEASE ORAL at 08:08

## 2018-08-16 RX ADMIN — DOCUSATE SODIUM 100 MG: 100 CAPSULE, LIQUID FILLED ORAL at 08:08

## 2018-08-16 NOTE — PT/OT/SLP PROGRESS
Occupational Therapy   Treatment    Name: Anjum Campbell  MRN: [id number]  Admitting Diagnosis:  CAD (coronary artery disease)  3 Days Post-Op    Recommendations:     Discharge Recommendations: home  Discharge Equipment Recommendations:  none  Barriers to discharge:  None    Subjective     Communicated with: RN prior to session.  Pain/Comfort:  · Pain Rating 1: (did not rate)  · Location - Orientation 1: generalized  · Location 1: sternal  · Pain Addressed 1: Nurse notified, Reposition, Distraction  · Pain Rating Post-Intervention 1: (did not rate)    Patients cultural, spiritual, Church conflicts given the current situation: none stated     Objective:     Patient found with: chest tube, fierro catheter, oxygen, arterial line, central line, telemetry    General Precautions: Standard, fall, sternal   Orthopedic Precautions:N/A   Braces: N/A     Occupational Performance:    Bed Mobility:    · Pt UIC     Functional Mobility/Transfers:  · Patient completed Sit <> Stand Transfer with stand by assistance and contact guard assistance  with  no assistive device   · Functional Mobility: Pt ambulated ~212ft with CGA and HHA for safety and stability. Oxygen, fierro, and chest tubes in tow. No signs of LOB but pt observed to become SOB towards end of ambulation.     Activities of Daily Living:  · Pt declined ADL activity    Patient left up in chair with all lines intact, call button in reach and RN and wife present    [facility] 6 Click: Self-care  [facility] Total Score: 16    Treatment & Education:  - OT POC  - Reviewed sternal precautions - pt able to verbalize 3/3   - Importance of OOB activity and ambulation in hallway w/ staff to maximize recovery   - Safety w/ functional mobility  - Deep breathing technique  - BLE strengthening exercises while UIC or in bed to promote increase/ maintain BLE strength facilitating functional transfers   Education:    Assessment:     Anjum Campbell is a 58 y.o. male with a medical  diagnosis of CAD (coronary artery disease).  He presents with the following performance deficits affecting function:  weakness, impaired endurance, impaired balance, impaired self care skills, gait instability, impaired functional mobilty, impaired cardiopulmonary response to activity, decreased lower extremity function.      Pt tolerated session well. Pt w/ improved recall of sternal precautions verbalizing 3/3 and indicating good safety awareness when performing functional transfers. Pt continues to become fatigued and SOB with activity and mobility but demonstrated an increase in endurance/tolerance by ambulating a further distance w/ therapist in PM than w/ RN in AM (100ft). Pt tolerated all BLE exercises well and demonstrated full understanding of technique. Pt will continue to benefit from skilled OT to address the above listed impairments.     Rehab Prognosis:  Good; patient would benefit from acute skilled OT services to address these deficits and reach maximum level of function.       Plan:     Patient to be seen 5 x/week to address the above listed problems via self-care/home management, therapeutic activities, therapeutic exercises  · Plan of Care Expires: 09/13/18  · Plan of Care Reviewed with: patient    This Plan of care has been discussed with the patient who was involved in its development and understands and is in agreement with the identified goals and treatment plan    GOALS:   Multidisciplinary Problems     Occupational Therapy Goals        Problem: Occupational Therapy Goal    Goal Priority Disciplines Outcome Interventions   Occupational Therapy Goal     OT, PT/OT Ongoing (interventions implemented as appropriate)    Description:  Goals to be met by: 8/29/2018    Pt will complete LB dressing with supervision.  Pt will complete UB dressing with independence.  Pt will complete toilet transfer with supervision.  Pt will verbalize 3/3 sternal precautions with <2 vc's in prep for safe toilet  transfer.  -- Met   Pt will complete supine with HOB flat to seated at EOB with SBA in prep for seated grooming task.  Pt will engage in functional mobility to simulate household and community mobility distances with SBA in order to maximize functional activity tolerance required for engagement in occupations of choice.                      Time Tracking:     OT Date of Treatment: 08/16/18  OT Start Time: 1320  OT Stop Time: 1347  OT Total Time (min): 27 min    Billable Minutes:Therapeutic Activity 17  Therapeutic Exercise 10    Brittani Spicer, OT  8/16/2018

## 2018-08-16 NOTE — SUBJECTIVE & OBJECTIVE
Interval History:  NSR    Medications:  Continuous Infusions:  Scheduled Meds:   aspirin  325 mg Oral Daily    atorvastatin  40 mg Oral QHS    docusate sodium  100 mg Oral BID    furosemide  20 mg Intravenous BID    mupirocin  1 g Nasal BID    pantoprazole  40 mg Oral Before breakfast    polyethylene glycol  17 g Oral Daily    potassium chloride  20 mEq Oral Q12H    sodium chloride 0.9%  3 mL Intravenous Q8H     PRN Meds:acetaminophen, bisacodyl, dextrose 50%, dextrose 50%, glucagon (human recombinant), glucose, glucose, HYDROmorphone, insulin aspart U-100, magnesium sulfate IVPB, magnesium sulfate IVPB, metoclopramide HCl, ondansetron, oxyCODONE, oxyCODONE     Objective:     Vital Signs (Most Recent):  Temp: 98.1 °F (36.7 °C) (08/16/18 1132)  Pulse: 92 (08/16/18 1132)  Resp: 18 (08/16/18 1132)  BP: 107/69 (08/16/18 1132)  SpO2: (!) 93 % (08/16/18 1132) Vital Signs (24h Range):  Temp:  [97.6 °F (36.4 °C)-98.2 °F (36.8 °C)] 98.1 °F (36.7 °C)  Pulse:  [] 92  Resp:  [14-26] 18  SpO2:  [90 %-100 %] 93 %  BP: (100-146)/(56-76) 107/69  Arterial Line BP: (121-159)/(49-69) 143/57     Weight: 81 kg (178 lb 9.2 oz)  Body mass index is 27.15 kg/m².    SpO2: (!) 93 %  O2 Device (Oxygen Therapy): nasal cannula    Intake/Output - Last 3 Shifts       08/14 0700 - 08/15 0659 08/15 0700 - 08/16 0659 08/16 0700 - 08/17 0659    P.O.  240     I.V. (mL/kg) 1516.7 (17.5)      NG/GT       Total Intake(mL/kg) 1516.7 (17.5) 240 (3)     Urine (mL/kg/hr) 2932 (1.4) 2595 (1.3)     Drains       Chest Tube 629 50     Total Output 3561 2645     Net -2044.4 -2405                  Lines/Drains/Airways     Central Venous Catheter Line                 Percutaneous Central Line Insertion/Assessment - Quad lumen  08/13/18 1331 right internal jugular 2 days         Percutaneous Central Line Insertion/Assessment - quad lumen  08/13/18 1331 right internal jugular;other (see comments) 2 days          Drain                 Chest Tube 08/13/18  1936 1 Anterior Pleural 19 Fr. 2 days         Urethral Catheter 08/13/18 1513 Temperature probe 14 Fr. 2 days         Y Chest Tube 1 and 2 08/13/18 1927 1 Right Mediastinal 19 Fr. 2 Left Mediastinal 19 Fr. 2 days          Line                 Pacer Wires 08/13/18 1900 2 days          Peripheral Intravenous Line                 Peripheral IV - Single Lumen 08/13/18 1230 Left Forearm 2 days                Physical Exam   Constitutional: He appears well-developed and well-nourished.   HENT:   Head: Normocephalic and atraumatic.   Eyes: EOM are normal. Pupils are equal, round, and reactive to light.   Neck: Normal range of motion. Neck supple.   Cardiovascular: Normal rate.   Pulmonary/Chest: Effort normal.   Abdominal: Soft.   Musculoskeletal: Normal range of motion.   Neurological: He is alert.   Skin: Skin is warm and dry.   Nursing note and vitals reviewed.      Significant Labs:  BMP:   Recent Labs   Lab  08/16/18   0338   GLU  113*   NA  139   K  4.4   CL  98   CO2  34*   BUN  19   CREATININE  0.7   CALCIUM  9.3   MG  2.4     CBC:   Recent Labs   Lab  08/16/18   0338   WBC  16.77*  16.77*   RBC  3.42*  3.42*   HGB  10.1*  10.1*   HCT  32.0*  32.0*   PLT  144*  144*   MCV  94  94   MCH  29.5  29.5   MCHC  31.6*  31.6*

## 2018-08-16 NOTE — PLAN OF CARE
Problem: Occupational Therapy Goal  Goal: Occupational Therapy Goal  Goals to be met by: 8/29/2018    Pt will complete LB dressing with supervision.  Pt will complete UB dressing with independence.  Pt will complete toilet transfer with supervision.  Pt will verbalize 3/3 sternal precautions with <2 vc's in prep for safe toilet transfer.   Pt will complete supine with HOB flat to seated at EOB with SBA in prep for seated grooming task.  Pt will engage in functional mobility to simulate household and community mobility distances with SBA in order to maximize functional activity tolerance required for engagement in occupations of choice.    Outcome: Ongoing (interventions implemented as appropriate)  Pt continues to progress towards all functional outcomes    Comments: Cont OT POC

## 2018-08-16 NOTE — PLAN OF CARE
Problem: Patient Care Overview  Goal: Plan of Care Review  Outcome: Ongoing (interventions implemented as appropriate)  Plan of care discussed with patient.  Patient ambulating independently, fall precautions in place. Pleural and meds CTs had no output over nightshift. Wheeler catheter in place. Continuing to encourage sternal precautions, IS, and ambulation. Patient has no complaints of pain. Discussed medications and care. Patient has no questions at this time. Will continue to monitor.

## 2018-08-16 NOTE — PLAN OF CARE
Problem: Patient Care Overview  Goal: Plan of Care Review  Outcome: Ongoing (interventions implemented as appropriate)  Plan of care discussed with patient.  Patient ambulating with assistance, fall precautions in place.Continuing to encourage sternal precautions, IS, and ambulation. Pt wife at beside. Patient has no complaints of pain. Discussed medications and care. Patient has no questions at this time. Will continue to monitor.

## 2018-08-16 NOTE — HOSPITAL COURSE
POSTOPERATIVE DIAGNOSES:  8/13/2018  1.  Coronary artery disease.  2.  Hypertension.  3.  Active smoking.  4.  History of congestive heart failure.     OPERATIONS PERFORMED:  1.  Coronary artery bypass grafting x1, with left internal mammary artery to   left anterior descending.  2.  Excision of mediastinal mass.

## 2018-08-16 NOTE — PROGRESS NOTES
[facility]  Cardiothoracic Surgery  Progress Note    Patient Name: Anjum Campbell  MRN: [id number]  Admission Date: 8/13/2018  Hospital Length of Stay: 3 days  Code Status: Full Code   Attending Physician: Orville Payne MD   Referring Provider: Orville Payne MD  Principal Problem:CAD (coronary artery disease)            Subjective:     Post-Op Info:  Procedure(s) (LRB):  CABGx1 (N/A)  EXCISION, MASS, MEDIASTINUM   3 Days Post-Op     Interval History:  NSR    Medications:  Continuous Infusions:  Scheduled Meds:   aspirin  325 mg Oral Daily    atorvastatin  40 mg Oral QHS    docusate sodium  100 mg Oral BID    furosemide  20 mg Intravenous BID    mupirocin  1 g Nasal BID    pantoprazole  40 mg Oral Before breakfast    polyethylene glycol  17 g Oral Daily    potassium chloride  20 mEq Oral Q12H    sodium chloride 0.9%  3 mL Intravenous Q8H     PRN Meds:acetaminophen, bisacodyl, dextrose 50%, dextrose 50%, glucagon (human recombinant), glucose, glucose, HYDROmorphone, insulin aspart U-100, magnesium sulfate IVPB, magnesium sulfate IVPB, metoclopramide HCl, ondansetron, oxyCODONE, oxyCODONE     Objective:     Vital Signs (Most Recent):  Temp: 98.1 °F (36.7 °C) (08/16/18 1132)  Pulse: 92 (08/16/18 1132)  Resp: 18 (08/16/18 1132)  BP: 107/69 (08/16/18 1132)  SpO2: (!) 93 % (08/16/18 1132) Vital Signs (24h Range):  Temp:  [97.6 °F (36.4 °C)-98.2 °F (36.8 °C)] 98.1 °F (36.7 °C)  Pulse:  [] 92  Resp:  [14-26] 18  SpO2:  [90 %-100 %] 93 %  BP: (100-146)/(56-76) 107/69  Arterial Line BP: (121-159)/(49-69) 143/57     Weight: 81 kg (178 lb 9.2 oz)  Body mass index is 27.15 kg/m².    SpO2: (!) 93 %  O2 Device (Oxygen Therapy): nasal cannula    Intake/Output - Last 3 Shifts       08/14 0700 - 08/15 0659 08/15 0700 - 08/16 0659 08/16 0700 - 08/17 0659    P.O.  240     I.V. (mL/kg) 1516.7 (17.5)      NG/GT       Total Intake(mL/kg) 1516.7 (17.5) 240 (3)     Urine (mL/kg/hr) 2932  (1.4) 2595 (1.3)     Drains       Chest Tube 629 50     Total Output 3561 2645     Net -2044.4 -2405                  Lines/Drains/Airways     Central Venous Catheter Line                 Percutaneous Central Line Insertion/Assessment - Quad lumen  08/13/18 1331 right internal jugular 2 days         Percutaneous Central Line Insertion/Assessment - quad lumen  08/13/18 1331 right internal jugular;other (see comments) 2 days          Drain                 Chest Tube 08/13/18 1936 1 Anterior Pleural 19 Fr. 2 days         Urethral Catheter 08/13/18 1513 Temperature probe 14 Fr. 2 days         Y Chest Tube 1 and 2 08/13/18 1927 1 Right Mediastinal 19 Fr. 2 Left Mediastinal 19 Fr. 2 days          Line                 Pacer Wires 08/13/18 1900 2 days          Peripheral Intravenous Line                 Peripheral IV - Single Lumen 08/13/18 1230 Left Forearm 2 days                Physical Exam   Constitutional: He appears well-developed and well-nourished.   HENT:   Head: Normocephalic and atraumatic.   Eyes: EOM are normal. Pupils are equal, round, and reactive to light.   Neck: Normal range of motion. Neck supple.   Cardiovascular: Normal rate.   Pulmonary/Chest: Effort normal.   Abdominal: Soft.   Musculoskeletal: Normal range of motion.   Neurological: He is alert.   Skin: Skin is warm and dry.   Nursing note and vitals reviewed.      Significant Labs:  BMP:   Recent Labs   Lab  08/16/18   0338   GLU  113*   NA  139   K  4.4   CL  98   CO2  34*   BUN  19   CREATININE  0.7   CALCIUM  9.3   MG  2.4     CBC:   Recent Labs   Lab  08/16/18   0338   WBC  16.77*  16.77*   RBC  3.42*  3.42*   HGB  10.1*  10.1*   HCT  32.0*  32.0*   PLT  144*  144*   MCV  94  94   MCH  29.5  29.5   MCHC  31.6*  31.6*           Assessment/Plan:     * CAD (coronary artery disease)    Continue statin          Stress hyperglycemia    Endocrine following.        S/P CABG (coronary artery bypass graft)    Sternal  precautions  PT/OT  Ambulate  DC chest tubes            Faby Lara NP  Cardiothoracic Surgery  [facility]

## 2018-08-16 NOTE — SUBJECTIVE & OBJECTIVE
"Interval HPI:   Overnight events: transferred to CTSU.POD #3.  BG at goal overnight without insulin. BG slightly above goal this AM but patient ate donut and other outside food before getting bg checked.   Eatin% -100% and outside food   Nausea: No  Hypoglycemia and intervention: No  Fever: No  TPN and/or TF: No    /63 (BP Location: Left arm, Patient Position: Sitting)   Pulse 101   Temp 98 °F (36.7 °C) (Oral)   Resp 18   Ht 5' 8" (1.727 m)   Wt 81 kg (178 lb 9.2 oz)   SpO2 99%   BMI 27.15 kg/m²     Labs Reviewed and Include    Recent Labs   Lab  18   0338   GLU  113*   CALCIUM  9.3   NA  139   K  4.4   CO2  34*   CL  98   BUN  19   CREATININE  0.7     Lab Results   Component Value Date    WBC 16.77 (H) 2018    WBC 16.77 (H) 2018    HGB 10.1 (L) 2018    HGB 10.1 (L) 2018    HCT 32.0 (L) 2018    HCT 32.0 (L) 2018    MCV 94 2018    MCV 94 2018     (L) 2018     (L) 2018     No results for input(s): TSH, FREET4 in the last 168 hours.  Lab Results   Component Value Date    HGBA1C 5.9 (H) 2018       Nutritional status:   Body mass index is 27.15 kg/m².  Lab Results   Component Value Date    ALBUMIN 4.2 2018    ALBUMIN 4.1 2018     No results found for: PREALBUMIN    Estimated Creatinine Clearance: 114 mL/min (based on SCr of 0.7 mg/dL).    Accu-Checks  Recent Labs      08/15/18   0612  08/15/18   0614  08/15/18   0635  08/15/18   0735  08/15/18   0823  08/15/18   0908  08/15/18   1113  08/15/18   1714  08/15/18   2252  18   0801   POCTGLUCOSE  87  85  145*  138*  86  144*  121*  100  126*  179*       Current Medications and/or Treatments Impacting Glycemic Control  Immunotherapy:    Immunosuppressants     None        Steroids:   Hormones (From admission, onward)    None        Pressors:    Autonomic Drugs (From admission, onward)    Start     Stop Route Frequency Ordered    18 1135  EPINEPHrine " (ADRENALIN) 1 mg/mL (1 mL) injection     Comments:  Created by cabinet override    08/14 2722   08/14/18 1135        Hyperglycemia/Diabetes Medications:   Antihyperglycemics (From admission, onward)    Start     Stop Route Frequency Ordered    08/15/18 1111  insulin aspart U-100 pen 1-10 Units      -- SubQ Before meals & nightly PRN 08/15/18 1012

## 2018-08-16 NOTE — ASSESSMENT & PLAN NOTE
BG goal 110-140  CTS protocol    Change bg monitoring to ac/hs and moderate dose correction scale.     ADDENDUM: BG at goal without insulin; post-prandial reading this AM. No history of DM. Will discontinue bg monitoring and sign off.     Discharge plans- resolution of hyperglycemia.

## 2018-08-16 NOTE — PROGRESS NOTES
"[facility]  Endocrinology  Progress Note    Admit Date: 2018     Reason for Consult: Management of Hyperglycemia     Surgical Procedure and Date: CABG on 18    HPI:   Patient is a 58 y.o. male with a diagnosis of CHF, CAD, and HTN. Recent heart cath on 18 with two vessel CAD involving primarly the proximal LAD and distal small non-dominant left circumflex per CTS note. Also with EF 30% on most recent echo. Patient is a smoker; 1 PPD. No personal history of DM. Endocrinology consulted for BG management.   Lab Results   Component Value Date    HGBA1C 5.9 (H) 2018                 Interval HPI:   Overnight events: transferred to CTSU.POD #3.  BG at goal overnight without insulin. BG slightly above goal this AM but patient ate donut and other outside food before getting bg checked.   Eatin% -100% and outside food   Nausea: No  Hypoglycemia and intervention: No  Fever: No  TPN and/or TF: No    /63 (BP Location: Left arm, Patient Position: Sitting)   Pulse 101   Temp 98 °F (36.7 °C) (Oral)   Resp 18   Ht 5' 8" (1.727 m)   Wt 81 kg (178 lb 9.2 oz)   SpO2 99%   BMI 27.15 kg/m²      Labs Reviewed and Include    Recent Labs   Lab  18   0338   GLU  113*   CALCIUM  9.3   NA  139   K  4.4   CO2  34*   CL  98   BUN  19   CREATININE  0.7     Lab Results   Component Value Date    WBC 16.77 (H) 2018    WBC 16.77 (H) 2018    HGB 10.1 (L) 2018    HGB 10.1 (L) 2018    HCT 32.0 (L) 2018    HCT 32.0 (L) 2018    MCV 94 2018    MCV 94 2018     (L) 2018     (L) 2018     No results for input(s): TSH, FREET4 in the last 168 hours.  Lab Results   Component Value Date    HGBA1C 5.9 (H) 2018       Nutritional status:   Body mass index is 27.15 kg/m².  Lab Results   Component Value Date    ALBUMIN 4.2 2018    ALBUMIN 4.1 2018     No results found for: PREALBUMIN    Estimated Creatinine " Clearance: 114 mL/min (based on SCr of 0.7 mg/dL).    Accu-Checks  Recent Labs      08/15/18   0612  08/15/18   0614  08/15/18   0635  08/15/18   0735  08/15/18   0823  08/15/18   0908  08/15/18   1113  08/15/18   1714  08/15/18   2252  08/16/18   0801   POCTGLUCOSE  87  85  145*  138*  86  144*  121*  100  126*  179*       Current Medications and/or Treatments Impacting Glycemic Control  Immunotherapy:    Immunosuppressants     None        Steroids:   Hormones (From admission, onward)    None        Pressors:    Autonomic Drugs (From admission, onward)    Start     Stop Route Frequency Ordered    08/14/18 1135  EPINEPHrine (ADRENALIN) 1 mg/mL (1 mL) injection     Comments:  Created by cabinet override    08/14 2344   08/14/18 1135        Hyperglycemia/Diabetes Medications:   Antihyperglycemics (From admission, onward)    Start     Stop Route Frequency Ordered    08/15/18 1111  insulin aspart U-100 pen 1-10 Units      -- SubQ Before meals & nightly PRN 08/15/18 1012          ASSESSMENT and PLAN    * CAD (coronary artery disease)    S/p CABG.   Managed per CTS.           Stress hyperglycemia    BG goal 110-140  CTS protocol    Change bg monitoring to ac/hs and moderate dose correction scale.     ADDENDUM: BG at goal without insulin; post-prandial reading this AM. No history of DM. Will discontinue bg monitoring and sign off.     Discharge plans- resolution of hyperglycemia.           S/P CABG (coronary artery bypass graft)    Managed per primary.   Optimize bg control.               Constanza Arreaga NP  Endocrinology  [facility]

## 2018-08-17 LAB
ANION GAP SERPL CALC-SCNC: 6 MMOL/L
APTT BLDCRRT: 21.1 SEC
BASOPHILS # BLD AUTO: 0.03 K/UL
BASOPHILS NFR BLD: 0.3 %
BUN SERPL-MCNC: 23 MG/DL
CALCIUM SERPL-MCNC: 9.3 MG/DL
CHLORIDE SERPL-SCNC: 97 MMOL/L
CO2 SERPL-SCNC: 35 MMOL/L
CREAT SERPL-MCNC: 0.8 MG/DL
DIFFERENTIAL METHOD: ABNORMAL
EOSINOPHIL # BLD AUTO: 0.2 K/UL
EOSINOPHIL NFR BLD: 1.3 %
ERYTHROCYTE [DISTWIDTH] IN BLOOD BY AUTOMATED COUNT: 13.2 %
EST. GFR  (AFRICAN AMERICAN): >60 ML/MIN/1.73 M^2
EST. GFR  (NON AFRICAN AMERICAN): >60 ML/MIN/1.73 M^2
GLUCOSE SERPL-MCNC: 98 MG/DL
HCT VFR BLD AUTO: 32.3 %
HGB BLD-MCNC: 10.4 G/DL
IMM GRANULOCYTES # BLD AUTO: 0.08 K/UL
IMM GRANULOCYTES NFR BLD AUTO: 0.7 %
LYMPHOCYTES # BLD AUTO: 2.1 K/UL
LYMPHOCYTES NFR BLD: 18.2 %
MAGNESIUM SERPL-MCNC: 2.2 MG/DL
MCH RBC QN AUTO: 30.5 PG
MCHC RBC AUTO-ENTMCNC: 32.2 G/DL
MCV RBC AUTO: 95 FL
MONOCYTES # BLD AUTO: 1 K/UL
MONOCYTES NFR BLD: 8.3 %
NEUTROPHILS # BLD AUTO: 8.3 K/UL
NEUTROPHILS NFR BLD: 71.2 %
NRBC BLD-RTO: 0 /100 WBC
PHOSPHATE SERPL-MCNC: 2.8 MG/DL
PLATELET # BLD AUTO: 154 K/UL
PMV BLD AUTO: 10.3 FL
POTASSIUM SERPL-SCNC: 4.5 MMOL/L
RBC # BLD AUTO: 3.41 M/UL
SODIUM SERPL-SCNC: 138 MMOL/L
WBC # BLD AUTO: 11.59 K/UL

## 2018-08-17 PROCEDURE — 85730 THROMBOPLASTIN TIME PARTIAL: CPT

## 2018-08-17 PROCEDURE — 94761 N-INVAS EAR/PLS OXIMETRY MLT: CPT

## 2018-08-17 PROCEDURE — 25000003 PHARM REV CODE 250: Performed by: STUDENT IN AN ORGANIZED HEALTH CARE EDUCATION/TRAINING PROGRAM

## 2018-08-17 PROCEDURE — 20600001 HC STEP DOWN PRIVATE ROOM

## 2018-08-17 PROCEDURE — 84100 ASSAY OF PHOSPHORUS: CPT

## 2018-08-17 PROCEDURE — 93005 ELECTROCARDIOGRAM TRACING: CPT

## 2018-08-17 PROCEDURE — 85025 COMPLETE CBC W/AUTO DIFF WBC: CPT

## 2018-08-17 PROCEDURE — 25000003 PHARM REV CODE 250: Performed by: NURSE PRACTITIONER

## 2018-08-17 PROCEDURE — 80048 BASIC METABOLIC PNL TOTAL CA: CPT

## 2018-08-17 PROCEDURE — 25000242 PHARM REV CODE 250 ALT 637 W/ HCPCS: Performed by: NURSE PRACTITIONER

## 2018-08-17 PROCEDURE — 97535 SELF CARE MNGMENT TRAINING: CPT

## 2018-08-17 PROCEDURE — 63600175 PHARM REV CODE 636 W HCPCS: Performed by: THORACIC SURGERY (CARDIOTHORACIC VASCULAR SURGERY)

## 2018-08-17 PROCEDURE — 27000221 HC OXYGEN, UP TO 24 HOURS

## 2018-08-17 PROCEDURE — 94640 AIRWAY INHALATION TREATMENT: CPT

## 2018-08-17 PROCEDURE — 97116 GAIT TRAINING THERAPY: CPT

## 2018-08-17 PROCEDURE — 97530 THERAPEUTIC ACTIVITIES: CPT

## 2018-08-17 PROCEDURE — 83735 ASSAY OF MAGNESIUM: CPT

## 2018-08-17 PROCEDURE — A4216 STERILE WATER/SALINE, 10 ML: HCPCS | Performed by: STUDENT IN AN ORGANIZED HEALTH CARE EDUCATION/TRAINING PROGRAM

## 2018-08-17 PROCEDURE — 93010 ELECTROCARDIOGRAM REPORT: CPT | Mod: ,,, | Performed by: INTERNAL MEDICINE

## 2018-08-17 PROCEDURE — 36415 COLL VENOUS BLD VENIPUNCTURE: CPT

## 2018-08-17 RX ORDER — CARVEDILOL 12.5 MG/1
12.5 TABLET ORAL 2 TIMES DAILY WITH MEALS
Status: DISCONTINUED | OUTPATIENT
Start: 2018-08-17 | End: 2018-08-18 | Stop reason: HOSPADM

## 2018-08-17 RX ADMIN — ATORVASTATIN CALCIUM 40 MG: 20 TABLET, FILM COATED ORAL at 09:08

## 2018-08-17 RX ADMIN — FUROSEMIDE 20 MG: 10 INJECTION, SOLUTION INTRAMUSCULAR; INTRAVENOUS at 09:08

## 2018-08-17 RX ADMIN — CARVEDILOL 6.25 MG: 6.25 TABLET, FILM COATED ORAL at 09:08

## 2018-08-17 RX ADMIN — Medication 3 ML: at 05:08

## 2018-08-17 RX ADMIN — IPRATROPIUM BROMIDE AND ALBUTEROL SULFATE 3 ML: .5; 3 SOLUTION RESPIRATORY (INHALATION) at 03:08

## 2018-08-17 RX ADMIN — POTASSIUM CHLORIDE 20 MEQ: 1500 TABLET, EXTENDED RELEASE ORAL at 09:08

## 2018-08-17 RX ADMIN — DOCUSATE SODIUM 100 MG: 100 CAPSULE, LIQUID FILLED ORAL at 09:08

## 2018-08-17 RX ADMIN — POLYETHYLENE GLYCOL 3350 17 G: 17 POWDER, FOR SOLUTION ORAL at 09:08

## 2018-08-17 RX ADMIN — OXYCODONE HYDROCHLORIDE 10 MG: 5 TABLET ORAL at 09:08

## 2018-08-17 RX ADMIN — OXYCODONE HYDROCHLORIDE 10 MG: 5 TABLET ORAL at 05:08

## 2018-08-17 RX ADMIN — IPRATROPIUM BROMIDE AND ALBUTEROL SULFATE 3 ML: .5; 3 SOLUTION RESPIRATORY (INHALATION) at 09:08

## 2018-08-17 RX ADMIN — IPRATROPIUM BROMIDE AND ALBUTEROL SULFATE 3 ML: .5; 3 SOLUTION RESPIRATORY (INHALATION) at 11:08

## 2018-08-17 RX ADMIN — OXYCODONE HYDROCHLORIDE 10 MG: 5 TABLET ORAL at 04:08

## 2018-08-17 RX ADMIN — OXYCODONE HYDROCHLORIDE 10 MG: 5 TABLET ORAL at 01:08

## 2018-08-17 RX ADMIN — PANTOPRAZOLE SODIUM 40 MG: 40 TABLET, DELAYED RELEASE ORAL at 05:08

## 2018-08-17 RX ADMIN — ASPIRIN 325 MG: 325 TABLET, DELAYED RELEASE ORAL at 09:08

## 2018-08-17 RX ADMIN — IPRATROPIUM BROMIDE AND ALBUTEROL SULFATE 3 ML: .5; 3 SOLUTION RESPIRATORY (INHALATION) at 07:08

## 2018-08-17 RX ADMIN — MUPIROCIN 1 G: 20 OINTMENT TOPICAL at 09:08

## 2018-08-17 RX ADMIN — FUROSEMIDE 20 MG: 10 INJECTION, SOLUTION INTRAMUSCULAR; INTRAVENOUS at 05:08

## 2018-08-17 RX ADMIN — Medication 3 ML: at 01:08

## 2018-08-17 RX ADMIN — CARVEDILOL 12.5 MG: 12.5 TABLET, FILM COATED ORAL at 05:08

## 2018-08-17 RX ADMIN — Medication 3 ML: at 09:08

## 2018-08-17 NOTE — ASSESSMENT & PLAN NOTE
Patient has been a life long smoker and continues to smoke.  Will wean O2 accepting sats of 88% or greater.

## 2018-08-17 NOTE — PLAN OF CARE
Problem: Physical Therapy Goal  Goal: Physical Therapy Goal  Goals to be met by: 18    Patient will increase functional independence with mobility by performin. Supine to sit with Stand-by Assistance -not met  2. Sit to stand transfer with Supervision-not met  3. Gait  x 250 feet with Supervision -not met     Outcome: Ongoing (interventions implemented as appropriate)  Treatment completed and no goals met. Goals appropriate.

## 2018-08-17 NOTE — PROGRESS NOTES
Spoke with Dr. Alba about pt complaining of Jaw pains on both sides 6/10 after walk this AM. MD order EKG. Will continue to monitor.

## 2018-08-17 NOTE — PT/OT/SLP PROGRESS
Occupational Therapy   Treatment    Name: Anjum Campbell  MRN: [id number]  Admitting Diagnosis:  CAD (coronary artery disease)  4 Days Post-Op    Recommendations:     Discharge Recommendations: home  Discharge Equipment Recommendations:  none  Barriers to discharge:  None    Subjective     Communicated with: RN prior to session.  Pain/Comfort:  · Pain Rating 1: 5/10(sternum )  · Pain Addressed 1: Distraction, Reposition  · Pain Rating Post-Intervention 1: 5/10    Patients cultural, spiritual, [employer] conflicts given the current situation: none stated    Objective:     Patient found with: telemetry, oxygen    General Precautions: Standard, fall, sternal   Orthopedic Precautions:N/A   Braces: N/A     Occupational Performance:    Bed Mobility:    · Patient completed Scooting/Bridging with supervision  · Patient completed Supine to Sit with minimum assistance     Functional Mobility/Transfers:  · Patient completed Sit <> Stand Transfer x3 with stand by assistance and contact guard assistance  with  no assistive device   · Patient completed Bed <> Chair Transfer using Step Transfer technique with stand by assistance with no assistive device  · Functional Mobility: Pt ambulated 400 ft w/ SBA and no AD. Pt required one sit down rest break 2/2 SOB. No LOB noted.     Activities of Daily Living:  · Upper Body Dressing: educated pt and spouse on UBD techniques to maintain sternal precautions  · Lower Body Dressing: maximal assistance donning B non skid socks     Patient left up in chair with all lines intact, call button in reach and family present    [facility] 6 Click: Self-care  [facility] Total Score: 16    Treatment & Education:  - OT POC  - Importance of OOB activity to maximize recovery   - reviewed all sternal precautions - pt recalled 3/3  - UBD adaptive techniques to facilitate task, promote functional independence, and maintain sternal precautions   - Deep breathing techniques  Education:    Assessment:     Anjum  Darrius Campbell is a 58 y.o. male with a medical diagnosis of CAD (coronary artery disease).  He presents with the following performance deficits affecting function:  weakness, impaired endurance, impaired self care skills, impaired cardiopulmonary response to activity, impaired functional mobilty, gait instability, impaired balance.      Pt tolerated session well. Pt continues to maintain all sternal precautions during functional task performances. Upon self-care education, pt verbalized understanding to adaptive strategies. He continues to have difficulty w/ LB dressing 2/2 reported pain and weakness. Pt able to ambulated 400ft around the unit but required a sit down rest break 2/2 SOB even with O2 in tow at 3L. Pt will continue to benefit from skilled OT to address the above listed impairments.     Rehab Prognosis:  Good; patient would benefit from acute skilled OT services to address these deficits and reach maximum level of function.       Plan:     Patient to be seen 5 x/week to address the above listed problems via therapeutic activities, therapeutic exercises, self-care/home management  · Plan of Care Expires: 09/13/18  · Plan of Care Reviewed with: patient, spouse    This Plan of care has been discussed with the patient who was involved in its development and understands and is in agreement with the identified goals and treatment plan    GOALS:   Multidisciplinary Problems     Occupational Therapy Goals        Problem: Occupational Therapy Goal    Goal Priority Disciplines Outcome Interventions   Occupational Therapy Goal     OT, PT/OT Ongoing (interventions implemented as appropriate)    Description:  Goals to be met by: 8/29/2018    Pt will complete LB dressing with supervision.  Pt will complete UB dressing with independence.  Pt will complete toilet transfer with supervision.  Pt will verbalize 3/3 sternal precautions with <2 vc's in prep for safe toilet transfer.  -- Met   Pt will complete supine with HOB  flat to seated at EOB with SBA in prep for seated grooming task.  Pt will engage in functional mobility to simulate household and community mobility distances with SBA in order to maximize functional activity tolerance required for engagement in occupations of choice.  -- Met                     Time Tracking:     OT Date of Treatment: 08/17/18  OT Start Time: 1455  OT Stop Time: 1518  OT Total Time (min): 23 min    Billable Minutes:Self Care/Home Management 10  Therapeutic Activity 13    Brittani Spicer, OT  8/17/2018

## 2018-08-17 NOTE — PLAN OF CARE
Problem: Patient Care Overview  Goal: Plan of Care Review  Outcome: Ongoing (interventions implemented as appropriate)  Plan of care discussed with patient.  VS stable. Patient ambulating with assistance, fall precautions in place. Continuing to encourage sternal precautions, IS, and ambulation. Pt wife at beside. Patient has complaints of pain PRN pain medication given. Pt had all CT removed today as well as fierro. Discussed medications and care. Patient has no questions at this time. Will continue to monitor.

## 2018-08-17 NOTE — PROGRESS NOTES
[facility]  Cardiothoracic Surgery  Progress Note    Patient Name: Anjum Campbell  MRN: [id number]  Admission Date: 8/13/2018  Hospital Length of Stay: 4 days  Code Status: Full Code   Attending Physician: Orville Payne MD   Referring Provider: Orville Payne MD  Principal Problem:CAD (coronary artery disease)            Subjective:     Post-Op Info:  Procedure(s) (LRB):  CABGx1 (N/A)  EXCISION, MASS, MEDIASTINUM   4 Days Post-Op     Interval History: NO acute events overnight.  SR per monitor.    Medications:  Continuous Infusions:  Scheduled Meds:   albuterol-ipratropium  3 mL Nebulization Q4H    aspirin  325 mg Oral Daily    atorvastatin  40 mg Oral QHS    carvedilol  6.25 mg Oral BID WM    docusate sodium  100 mg Oral BID    furosemide  20 mg Intravenous BID    mupirocin  1 g Nasal BID    pantoprazole  40 mg Oral Before breakfast    polyethylene glycol  17 g Oral Daily    potassium chloride  20 mEq Oral Q12H    sodium chloride 0.9%  3 mL Intravenous Q8H     PRN Meds:acetaminophen, bisacodyl, magnesium sulfate IVPB, magnesium sulfate IVPB, metoclopramide HCl, ondansetron, oxyCODONE, oxyCODONE     Objective:     Vital Signs (Most Recent):  Temp: 97.5 °F (36.4 °C) (08/17/18 1120)  Pulse: 85 (08/17/18 1158)  Resp: 19 (08/17/18 1158)  BP: 111/61 (08/17/18 1120)  SpO2: 97 % (08/17/18 1158) Vital Signs (24h Range):  Temp:  [97.5 °F (36.4 °C)-98.5 °F (36.9 °C)] 97.5 °F (36.4 °C)  Pulse:  [] 85  Resp:  [14-22] 19  SpO2:  [88 %-99 %] 97 %  BP: ()/(60-66) 111/61     Weight: 80.7 kg (177 lb 14.6 oz)  Body mass index is 27.05 kg/m².    SpO2: 97 %  O2 Device (Oxygen Therapy): nasal cannula    Intake/Output - Last 3 Shifts       08/15 0700 - 08/16 0659 08/16 0700 - 08/17 0659 08/17 0700 - 08/18 0659    P.O. 240 1140     I.V. (mL/kg)       Total Intake(mL/kg) 240 (3) 1140 (14.1)     Urine (mL/kg/hr) 2595 (1.3) 2300 (1.2)     Stool  0     Chest Tube 50      Total Output  4454 2306     Net -2405 -1160            Stool Occurrence  0 x           Lines/Drains/Airways     Line                 Pacer Wires 08/13/18 1900 3 days          Peripheral Intravenous Line                 Peripheral IV - Single Lumen 08/13/18 1230 Left Forearm 4 days                Physical Exam   Constitutional: He is oriented to person, place, and time. He appears well-developed and well-nourished.   HENT:   Head: Normocephalic and atraumatic.   Eyes: EOM are normal.   Neck: Neck supple.   Cardiovascular: Normal rate.   Pulmonary/Chest: Effort normal.   Abdominal: Soft.   Musculoskeletal: Normal range of motion.   Neurological: He is alert and oriented to person, place, and time.   Skin: Skin is warm and dry.   Nursing note and vitals reviewed.      Significant Labs:  BMP:   Recent Labs   Lab  08/17/18   0612   GLU  98   NA  138   K  4.5   CL  97   CO2  35*   BUN  23*   CREATININE  0.8   CALCIUM  9.3   MG  2.2     CBC:   Recent Labs   Lab  08/17/18   0612   WBC  11.59   RBC  3.41*   HGB  10.4*   HCT  32.3*   PLT  154   MCV  95   MCH  30.5   MCHC  32.2         Assessment/Plan:     * CAD (coronary artery disease)    Continue statin          Stress hyperglycemia    Endocrine following.        S/P CABG (coronary artery bypass graft)    Sternal precautions  PT/OT  Ambulate          Tobacco abuse    Patient has been a life long smoker and continues to smoke.  Will wean O2 accepting sats of 88% or greater.            Faby Lara NP  Cardiothoracic Surgery  [facility]

## 2018-08-17 NOTE — PLAN OF CARE
Problem: Patient Care Overview  Goal: Plan of Care Review  Outcome: Ongoing (interventions implemented as appropriate)  Patient vital signs stable and remains free from injury.  Patient educated on plan of care: ambulate x 4, wean O2, pain control and reports understanding.  All questions and concerns addressed.

## 2018-08-17 NOTE — SUBJECTIVE & OBJECTIVE
Interval History: NO acute events overnight.  SR per monitor.    Medications:  Continuous Infusions:  Scheduled Meds:   albuterol-ipratropium  3 mL Nebulization Q4H    aspirin  325 mg Oral Daily    atorvastatin  40 mg Oral QHS    carvedilol  6.25 mg Oral BID WM    docusate sodium  100 mg Oral BID    furosemide  20 mg Intravenous BID    mupirocin  1 g Nasal BID    pantoprazole  40 mg Oral Before breakfast    polyethylene glycol  17 g Oral Daily    potassium chloride  20 mEq Oral Q12H    sodium chloride 0.9%  3 mL Intravenous Q8H     PRN Meds:acetaminophen, bisacodyl, magnesium sulfate IVPB, magnesium sulfate IVPB, metoclopramide HCl, ondansetron, oxyCODONE, oxyCODONE     Objective:     Vital Signs (Most Recent):  Temp: 97.5 °F (36.4 °C) (08/17/18 1120)  Pulse: 85 (08/17/18 1158)  Resp: 19 (08/17/18 1158)  BP: 111/61 (08/17/18 1120)  SpO2: 97 % (08/17/18 1158) Vital Signs (24h Range):  Temp:  [97.5 °F (36.4 °C)-98.5 °F (36.9 °C)] 97.5 °F (36.4 °C)  Pulse:  [] 85  Resp:  [14-22] 19  SpO2:  [88 %-99 %] 97 %  BP: ()/(60-66) 111/61     Weight: 80.7 kg (177 lb 14.6 oz)  Body mass index is 27.05 kg/m².    SpO2: 97 %  O2 Device (Oxygen Therapy): nasal cannula    Intake/Output - Last 3 Shifts       08/15 0700 - 08/16 0659 08/16 0700 - 08/17 0659 08/17 0700 - 08/18 0659    P.O. 240 1140     I.V. (mL/kg)       Total Intake(mL/kg) 240 (3) 1140 (14.1)     Urine (mL/kg/hr) 2595 (1.3) 2300 (1.2)     Stool  0     Chest Tube 50      Total Output 2645 2300     Net -2405 -1160            Stool Occurrence  0 x           Lines/Drains/Airways     Line                 Pacer Wires 08/13/18 1900 3 days          Peripheral Intravenous Line                 Peripheral IV - Single Lumen 08/13/18 1230 Left Forearm 4 days                Physical Exam   Constitutional: He is oriented to person, place, and time. He appears well-developed and well-nourished.   HENT:   Head: Normocephalic and atraumatic.   Eyes: EOM are normal.    Neck: Neck supple.   Cardiovascular: Normal rate.   Pulmonary/Chest: Effort normal.   Abdominal: Soft.   Musculoskeletal: Normal range of motion.   Neurological: He is alert and oriented to person, place, and time.   Skin: Skin is warm and dry.   Nursing note and vitals reviewed.      Significant Labs:  BMP:   Recent Labs   Lab  08/17/18 0612   GLU  98   NA  138   K  4.5   CL  97   CO2  35*   BUN  23*   CREATININE  0.8   CALCIUM  9.3   MG  2.2     CBC:   Recent Labs   Lab  08/17/18 0612   WBC  11.59   RBC  3.41*   HGB  10.4*   HCT  32.3*   PLT  154   MCV  95   MCH  30.5   MCHC  32.2

## 2018-08-17 NOTE — PT/OT/SLP PROGRESS
Physical Therapy Treatment    Patient Name:  Anjum Campbell   MRN:  [id number]    Recommendations:     Discharge Recommendations:  home   Discharge Equipment Recommendations: none   Barriers to discharge: None    Assessment:     Anjum Campbell is a 58 y.o. male admitted with a medical diagnosis of CAD (coronary artery disease).  He presents with the following impairments/functional limitations:  weakness, impaired endurance, gait instability, impaired balance, impaired functional mobilty, pain, impaired cardiopulmonary response to activity. Pt progressing towards goals, but not at PLOF. Pt tolerated session well. Pt is improving with therapy evidenced by increased gait distance. Recommend d/c to Home to maximize functional independence.      Rehab Prognosis:  good; patient would benefit from acute skilled PT services to address these deficits and reach maximum level of function.      Recent Surgery: Procedure(s) (LRB):  CABGx1 (N/A)  EXCISION, MASS, MEDIASTINUM 4 Days Post-Op    Plan:     During this hospitalization, patient to be seen 5 x/week to address the above listed problems via gait training, therapeutic activities, therapeutic exercises, neuromuscular re-education  · Plan of Care Expires:  09/13/18   Plan of Care Reviewed with: patient    Subjective     Communicated with RN prior to session.  Patient found in chair upon PT entry to room, agreeable to treatment.      Chief Complaint: pain  Patient comments/goals: to get better and return home   Pain/Comfort:  · Pain Rating 1: 6/10(sternum)  · Pain Addressed 1: Distraction, Reposition  · Pain Rating Post-Intervention 1: 6/10    Patients cultural, spiritual, Sikh conflicts given the current situation: none reported     Objective:     Patient found with: telemetry, oxygen     General Precautions: Standard, fall, sternal   Orthopedic Precautions:N/A   Braces: N/A     Functional Mobility:  · Bed Mobility: NT 2nd to pt found in chair   · Transfers:      · Sit to Stand:  stand by assistance with no AD from chair  · Gait: ~400ft with SBA  · No LOB  · SOB present; cuing for deep breathing techniques  · Increase in B knee flexion with increased fatigue   · Decreased mari      AM-PAC 6 CLICK MOBILITY  Turning over in bed (including adjusting bedclothes, sheets and blankets)?: 3  Sitting down on and standing up from a chair with arms (e.g., wheelchair, bedside commode, etc.): 3  Moving from lying on back to sitting on the side of the bed?: 3  Moving to and from a bed to a chair (including a wheelchair)?: 3  Need to walk in hospital room?: 3  Climbing 3-5 steps with a railing?: 3  Basic Mobility Total Score: 18       Therapeutic Activities and Exercises:  Educated pt on PT POC  Educated pt on importance of OOB activity  Educated pt on sternal precautions  Pt verbalized understanding    Patient left up in chair with all lines intact, call button in reach, RN notified and wife present..    GOALS:   Multidisciplinary Problems     Physical Therapy Goals        Problem: Physical Therapy Goal    Goal Priority Disciplines Outcome Goal Variances Interventions   Physical Therapy Goal     PT, PT/OT Ongoing (interventions implemented as appropriate)     Description:  Goals to be met by: 18    Patient will increase functional independence with mobility by performin. Supine to sit with Stand-by Assistance -not met  2. Sit to stand transfer with Supervision-not met  3. Gait  x 250 feet with Supervision -not met                      Time Tracking:     PT Received On: 18  PT Start Time: 1058     PT Stop Time: 1115  PT Total Time (min): 17 min     Billable Minutes: Gait Training 17    Treatment Type: Treatment  PT/PTA: PT     PTA Visit Number: 0     Carmen Nolen PT, DPT  2018  [phone]

## 2018-08-17 NOTE — PLAN OF CARE
Problem: Occupational Therapy Goal  Goal: Occupational Therapy Goal  Goals to be met by: 8/29/2018    Pt will complete LB dressing with supervision.  Pt will complete UB dressing with independence.  Pt will complete toilet transfer with supervision.  Pt will verbalize 3/3 sternal precautions with <2 vc's in prep for safe toilet transfer.  -- Met   Pt will complete supine with HOB flat to seated at EOB with SBA in prep for seated grooming task.  Pt will engage in functional mobility to simulate household and community mobility distances with SBA in order to maximize functional activity tolerance required for engagement in occupations of choice.  -- Met   Outcome: Ongoing (interventions implemented as appropriate)  Pt progressing well with therapy    Comments: Cont OT POC

## 2018-08-18 VITALS
BODY MASS INDEX: 26.93 KG/M2 | OXYGEN SATURATION: 93 % | HEIGHT: 68 IN | WEIGHT: 177.69 LBS | SYSTOLIC BLOOD PRESSURE: 105 MMHG | HEART RATE: 102 BPM | DIASTOLIC BLOOD PRESSURE: 58 MMHG | TEMPERATURE: 100 F | RESPIRATION RATE: 16 BRPM

## 2018-08-18 LAB
ANION GAP SERPL CALC-SCNC: 8 MMOL/L
APTT BLDCRRT: 21.7 SEC
BASOPHILS # BLD AUTO: 0.03 K/UL
BASOPHILS NFR BLD: 0.3 %
BUN SERPL-MCNC: 24 MG/DL
CALCIUM SERPL-MCNC: 9.3 MG/DL
CHLORIDE SERPL-SCNC: 96 MMOL/L
CO2 SERPL-SCNC: 33 MMOL/L
CREAT SERPL-MCNC: 0.8 MG/DL
DIFFERENTIAL METHOD: ABNORMAL
EOSINOPHIL # BLD AUTO: 0.2 K/UL
EOSINOPHIL NFR BLD: 1.5 %
ERYTHROCYTE [DISTWIDTH] IN BLOOD BY AUTOMATED COUNT: 13.2 %
EST. GFR  (AFRICAN AMERICAN): >60 ML/MIN/1.73 M^2
EST. GFR  (NON AFRICAN AMERICAN): >60 ML/MIN/1.73 M^2
GLUCOSE SERPL-MCNC: 100 MG/DL
HCT VFR BLD AUTO: 32.1 %
HGB BLD-MCNC: 10.6 G/DL
IMM GRANULOCYTES # BLD AUTO: 0.06 K/UL
IMM GRANULOCYTES NFR BLD AUTO: 0.6 %
LYMPHOCYTES # BLD AUTO: 2 K/UL
LYMPHOCYTES NFR BLD: 21 %
MAGNESIUM SERPL-MCNC: 2 MG/DL
MCH RBC QN AUTO: 30.2 PG
MCHC RBC AUTO-ENTMCNC: 33 G/DL
MCV RBC AUTO: 92 FL
MONOCYTES # BLD AUTO: 1 K/UL
MONOCYTES NFR BLD: 10.6 %
NEUTROPHILS # BLD AUTO: 6.4 K/UL
NEUTROPHILS NFR BLD: 66 %
NRBC BLD-RTO: 0 /100 WBC
PHOSPHATE SERPL-MCNC: 3.4 MG/DL
PLATELET # BLD AUTO: 188 K/UL
PMV BLD AUTO: 10.6 FL
POTASSIUM SERPL-SCNC: 3.9 MMOL/L
RBC # BLD AUTO: 3.51 M/UL
SODIUM SERPL-SCNC: 137 MMOL/L
WBC # BLD AUTO: 9.72 K/UL

## 2018-08-18 PROCEDURE — 83735 ASSAY OF MAGNESIUM: CPT

## 2018-08-18 PROCEDURE — 84100 ASSAY OF PHOSPHORUS: CPT

## 2018-08-18 PROCEDURE — 85025 COMPLETE CBC W/AUTO DIFF WBC: CPT

## 2018-08-18 PROCEDURE — 25000003 PHARM REV CODE 250: Performed by: STUDENT IN AN ORGANIZED HEALTH CARE EDUCATION/TRAINING PROGRAM

## 2018-08-18 PROCEDURE — 80048 BASIC METABOLIC PNL TOTAL CA: CPT

## 2018-08-18 PROCEDURE — A4216 STERILE WATER/SALINE, 10 ML: HCPCS | Performed by: STUDENT IN AN ORGANIZED HEALTH CARE EDUCATION/TRAINING PROGRAM

## 2018-08-18 PROCEDURE — 25000003 PHARM REV CODE 250: Performed by: NURSE PRACTITIONER

## 2018-08-18 PROCEDURE — 85730 THROMBOPLASTIN TIME PARTIAL: CPT

## 2018-08-18 PROCEDURE — 25000242 PHARM REV CODE 250 ALT 637 W/ HCPCS: Performed by: NURSE PRACTITIONER

## 2018-08-18 PROCEDURE — 94640 AIRWAY INHALATION TREATMENT: CPT

## 2018-08-18 PROCEDURE — 36415 COLL VENOUS BLD VENIPUNCTURE: CPT

## 2018-08-18 PROCEDURE — 63600175 PHARM REV CODE 636 W HCPCS: Performed by: THORACIC SURGERY (CARDIOTHORACIC VASCULAR SURGERY)

## 2018-08-18 PROCEDURE — 94761 N-INVAS EAR/PLS OXIMETRY MLT: CPT

## 2018-08-18 RX ORDER — OXYCODONE AND ACETAMINOPHEN 5; 325 MG/1; MG/1
1-2 TABLET ORAL EVERY 4 HOURS PRN
Qty: 61 TABLET | Refills: 0 | Status: SHIPPED | OUTPATIENT
Start: 2018-08-18 | End: 2018-09-18 | Stop reason: ALTCHOICE

## 2018-08-18 RX ORDER — FUROSEMIDE 20 MG/1
20 TABLET ORAL 2 TIMES DAILY
Qty: 14 TABLET | Refills: 0 | Status: SHIPPED | OUTPATIENT
Start: 2018-08-18 | End: 2018-08-25

## 2018-08-18 RX ORDER — DOCUSATE SODIUM 100 MG/1
100 CAPSULE, LIQUID FILLED ORAL 2 TIMES DAILY
Refills: 0 | COMMUNITY
Start: 2018-08-18 | End: 2018-09-18 | Stop reason: ALTCHOICE

## 2018-08-18 RX ORDER — FUROSEMIDE 20 MG/1
20 TABLET ORAL DAILY
Qty: 30 TABLET | Refills: 0 | Status: SHIPPED | OUTPATIENT
Start: 2018-08-26 | End: 2018-09-18 | Stop reason: ALTCHOICE

## 2018-08-18 RX ORDER — ASPIRIN 325 MG
325 TABLET, DELAYED RELEASE (ENTERIC COATED) ORAL DAILY
Refills: 0 | COMMUNITY
Start: 2018-08-19 | End: 2019-03-25

## 2018-08-18 RX ORDER — POTASSIUM CHLORIDE 20 MEQ/1
20 TABLET, EXTENDED RELEASE ORAL 2 TIMES DAILY
Qty: 14 TABLET | Refills: 0 | Status: SHIPPED | OUTPATIENT
Start: 2018-08-18 | End: 2018-08-25

## 2018-08-18 RX ORDER — CARVEDILOL 12.5 MG/1
25 TABLET ORAL 2 TIMES DAILY WITH MEALS
Qty: 120 TABLET | Refills: 11 | Status: SHIPPED | OUTPATIENT
Start: 2018-08-18 | End: 2019-03-25 | Stop reason: SDUPTHER

## 2018-08-18 RX ORDER — CARVEDILOL 12.5 MG/1
12.5 TABLET ORAL ONCE
Status: COMPLETED | OUTPATIENT
Start: 2018-08-18 | End: 2018-08-18

## 2018-08-18 RX ORDER — POTASSIUM CHLORIDE 20 MEQ/1
20 TABLET, EXTENDED RELEASE ORAL DAILY
Qty: 30 TABLET | Refills: 0 | Status: SHIPPED | OUTPATIENT
Start: 2018-08-26 | End: 2018-09-18 | Stop reason: ALTCHOICE

## 2018-08-18 RX ORDER — POTASSIUM CHLORIDE 750 MG/1
10 CAPSULE, EXTENDED RELEASE ORAL ONCE
Status: COMPLETED | OUTPATIENT
Start: 2018-08-18 | End: 2018-08-18

## 2018-08-18 RX ORDER — ATORVASTATIN CALCIUM 40 MG/1
40 TABLET, FILM COATED ORAL DAILY
Qty: 90 TABLET | Refills: 3 | Status: SHIPPED | OUTPATIENT
Start: 2018-08-18 | End: 2019-03-25 | Stop reason: SDUPTHER

## 2018-08-18 RX ADMIN — DOCUSATE SODIUM 100 MG: 100 CAPSULE, LIQUID FILLED ORAL at 08:08

## 2018-08-18 RX ADMIN — POLYETHYLENE GLYCOL 3350 17 G: 17 POWDER, FOR SOLUTION ORAL at 08:08

## 2018-08-18 RX ADMIN — IPRATROPIUM BROMIDE AND ALBUTEROL SULFATE 3 ML: .5; 3 SOLUTION RESPIRATORY (INHALATION) at 12:08

## 2018-08-18 RX ADMIN — IPRATROPIUM BROMIDE AND ALBUTEROL SULFATE 3 ML: .5; 3 SOLUTION RESPIRATORY (INHALATION) at 01:08

## 2018-08-18 RX ADMIN — CARVEDILOL 12.5 MG: 12.5 TABLET, FILM COATED ORAL at 12:08

## 2018-08-18 RX ADMIN — OXYCODONE HYDROCHLORIDE 10 MG: 5 TABLET ORAL at 08:08

## 2018-08-18 RX ADMIN — ASPIRIN 325 MG: 325 TABLET, DELAYED RELEASE ORAL at 08:08

## 2018-08-18 RX ADMIN — POTASSIUM CHLORIDE 10 MEQ: 750 CAPSULE, EXTENDED RELEASE ORAL at 09:08

## 2018-08-18 RX ADMIN — POTASSIUM CHLORIDE 20 MEQ: 1500 TABLET, EXTENDED RELEASE ORAL at 08:08

## 2018-08-18 RX ADMIN — CARVEDILOL 12.5 MG: 12.5 TABLET, FILM COATED ORAL at 08:08

## 2018-08-18 RX ADMIN — PANTOPRAZOLE SODIUM 40 MG: 40 TABLET, DELAYED RELEASE ORAL at 05:08

## 2018-08-18 RX ADMIN — Medication 3 ML: at 05:08

## 2018-08-18 RX ADMIN — OXYCODONE HYDROCHLORIDE 10 MG: 5 TABLET ORAL at 04:08

## 2018-08-18 RX ADMIN — FUROSEMIDE 20 MG: 10 INJECTION, SOLUTION INTRAMUSCULAR; INTRAVENOUS at 08:08

## 2018-08-18 RX ADMIN — IPRATROPIUM BROMIDE AND ALBUTEROL SULFATE 3 ML: .5; 3 SOLUTION RESPIRATORY (INHALATION) at 04:08

## 2018-08-18 NOTE — DISCHARGE SUMMARY
[facility]  General Surgery  Discharge Summary      Patient Name: Anjum Campbell  MRN: [id number]  Admission Date: 8/13/2018  Hospital Length of Stay: 5 days  Discharge Date and Time:  08/18/2018 11:32 AM  Attending Physician: Orville Payne MD   Discharging Provider: Joellen Root MD  Primary Care Provider: Enmanuel Doe MD     HPI: Patient is a 58 y.o. male, referred by Dr. Trevizo, with CAD.  He's known to have significantly reduced EF.  He had long lesion extending from the ostium to the mid LAD.   CT surgery opinion was desired and then could decide LIMA to LAD versus PCI.  He's had a multi episodes of chest pain at rest which were nitroglycerin  responsive since the catheterization, up to 7 daily.  He denies any other symptoms        Procedure(s) (LRB):  CABGx1 (N/A)  EXCISION, MASS, MEDIASTINUM     Hospital Course: On 8/13/18, the patient was taken to the Operating Room for the above stated procedure. Please see the previously dictated operative report for complete details. Postoperatively, the patient was taken from the  Operating Room to the ICU where the vital signs were monitored and pain was kept under control. The patient was weaned from the drips and extubated in the ICU per protocol. Once hemodynamically stable, the patient was transferred to the Cardiac Step-Down floor for continued strengthening and ambulation. On postoperative day 2, the patient was ready for discharge to home. At the time of discharge, the patient was ambulating unassisted. Pain was well controlled with oral analgesics and the patient was tolerating the diet.     MOBILITY AND ACTIVITY: As tolerated. Patient may shower. No heavy lifting of greater than 5 pounds and no driving.     DIET: An 1800-calorie ADA with a 1500 mL fluid restriction.     WOUND CARE INSTRUCTIONS: Check for redness, swelling and drainage around the  incision or wound. Patient is to call for any obvious bleeding, drainage, pus  from the wound, unusual problems or difficulties or temperature of greater than 101   degrees.     FOLLOWUP: Follow up with Dr. Payne in approximately 3 weeks. Prior to this  appointment, the patient will have a chest x-ray and EKG.     Patient not placed on Ace-Inhibitor at the time of discharge due to potential for hypotension       DISCHARGE CONDITION: At the time of discharge, the patient was in sinus rhythm and afebrile with stable vital signs.      Consults (From admission, onward)        Status Ordering Provider     Consult Case Management/Social Work  Once     Provider:  (Not yet assigned)    Acknowledged AVERY HURTADO     Consult to Endocrinology  Once     Provider:  (Not yet assigned)    Completed AVERY HURTADO          Significant Diagnostic Studies: Labs:   CMP   Recent Labs   Lab  08/17/18   0612  08/18/18   0549   NA  138  137   K  4.5  3.9   CL  97  96   CO2  35*  33*   GLU  98  100   BUN  23*  24*   CREATININE  0.8  0.8   CALCIUM  9.3  9.3   ANIONGAP  6*  8   ESTGFRAFRICA  >60.0  >60.0   EGFRNONAA  >60.0  >60.0   , CBC   Recent Labs   Lab  08/17/18   0612  08/18/18   0549   WBC  11.59  9.72   HGB  10.4*  10.6*   HCT  32.3*  32.1*   PLT  154  188    and INR   Lab Results   Component Value Date    INR 1.0 08/14/2018    INR 1.1 08/13/2018    INR 1.0 08/09/2018       Pending Diagnostic Studies:     Procedure Component Value Units Date/Time    X-Ray Chest AP Portable [[id number]]     Order Status:  Sent Lab Status:  No result         Final Active Diagnoses:    Diagnosis Date Noted POA    PRINCIPAL PROBLEM:  CAD (coronary artery disease) [I25.10] 06/28/2018 Yes    S/P CABG (coronary artery bypass graft) [Z95.1] 08/14/2018 Not Applicable    Stress hyperglycemia [R73.9] 08/14/2018 Unknown    Tobacco abuse [Z72.0] 06/18/2018 Yes      Problems Resolved During this Admission:      Discharged Condition: good    Disposition: Home or Self Care    Follow Up:  Follow-up Information     Orville DE LEON  MD Elmer In 3 weeks.    Specialties:  Cardiothoracic Surgery, Transplant  Why:  Post-op  Contact information:  [address]  [facility] [id number]  [phone]                 Patient Instructions:      Diet Cardiac     Diet Cardiac     Lifting restrictions   Order Comments: Do not lift anything heavier than 5lbs for six weeks.     No driving until:   Order Comments: Do not drive until follow up appointment in clinic.     Notify your health care provider if you experience any of the following:  temperature >100.4     Notify your health care provider if you experience any of the following:  persistent nausea and vomiting or diarrhea     Notify your health care provider if you experience any of the following:  severe uncontrolled pain     Notify your health care provider if you experience any of the following:  redness, tenderness, or signs of infection (pain, swelling, redness, odor or green/yellow discharge around incision site)     Notify your health care provider if you experience any of the following:  difficulty breathing or increased cough     Notify your health care provider if you experience any of the following:  persistent dizziness, light-headedness, or visual disturbances     Notify your health care provider if you experience any of the following:  increased confusion or weakness     No dressing needed     Lifting restrictions   Order Comments: Do not lift anything heavier than 5lbs for six weeks.     No driving until:   Order Comments: No driving until follow up appointment     Notify your health care provider if you experience any of the following:  temperature >100.4     Notify your health care provider if you experience any of the following:  persistent nausea and vomiting or diarrhea     Notify your health care provider if you experience any of the following:  severe uncontrolled pain     Notify your health care provider if you experience any of the following:  redness, tenderness, or signs  of infection (pain, swelling, redness, odor or green/yellow discharge around incision site)     Notify your health care provider if you experience any of the following:  difficulty breathing or increased cough     No dressing needed     Activity as tolerated     Shower on day dressing removed (No bath)   Order Comments: Shower after 48 hours after surgery, do not submerge incisions for 6 weeks.     Activity as tolerated     Shower on day dressing removed (No bath)   Order Comments: Shower after 48 hours after surgery, do not submerge incisions for 6 weeks.     Medications:  Reconciled Home Medications:      Medication List      START taking these medications    aspirin 325 MG EC tablet  Commonly known as:  ECOTRIN  Take 1 tablet (325 mg total) by mouth once daily.  Replaces:  aspirin 81 MG Chew     docusate sodium 100 MG capsule  Commonly known as:  COLACE  Take 1 capsule (100 mg total) by mouth 2 (two) times daily.     * furosemide 20 MG tablet  Commonly known as:  LASIX  Take 1 tablet (20 mg total) by mouth 2 (two) times daily. for 7 days     * furosemide 20 MG tablet  Commonly known as:  LASIX  Take 1 tablet (20 mg total) by mouth once daily. Take once daily until follow up appointment  Start taking on:  8/26/2018     oxyCODONE-acetaminophen 5-325 mg per tablet  Commonly known as:  PERCOCET  Take 1-2 tablets by mouth every 4 (four) hours as needed for Pain (Enough for 1 week, do not exceed more than 10 tablets in one day.).     * potassium chloride SA 20 MEQ tablet  Commonly known as:  K-DUR,KLOR-CON  Take 1 tablet (20 mEq total) by mouth 2 (two) times daily. Take with lasix twice daily for one week for 7 days     * potassium chloride SA 20 MEQ tablet  Commonly known as:  K-DUR,KLOR-CON  Take 1 tablet (20 mEq total) by mouth once daily. Take once daily with lasix until follow up appointment  Start taking on:  8/26/2018         * This list has 4 medication(s) that are the same as other medications prescribed for  you. Read the directions carefully, and ask your doctor or other care provider to review them with you.            CHANGE how you take these medications    carvedilol 12.5 MG tablet  Commonly known as:  COREG  Take 2 tablets (25 mg total) by mouth 2 (two) times daily with meals.  What changed:  how much to take        CONTINUE taking these medications    atorvastatin 40 MG tablet  Commonly known as:  LIPITOR  Take 1 tablet (40 mg total) by mouth once daily.     ibuprofen 200 MG tablet  Commonly known as:  ADVIL,MOTRIN  Take 200 mg by mouth every 6 (six) hours as needed for Pain.        STOP taking these medications    aspirin 81 MG Chew  Replaced by:  aspirin 325 MG EC tablet     clopidogrel 75 mg tablet  Commonly known as:  PLAVIX     isosorbide mononitrate 30 MG 24 hr tablet  Commonly known as:  IMDUR     lisinopril-hydrochlorothiazide 20-12.5 mg per tablet  Commonly known as:  PRINZIDE,ZESTORETIC     nitroGLYCERIN 0.4 MG SL tablet  Commonly known as:  NITROSTAT            Joellen Root MD  General Surgery  [facility]

## 2018-08-18 NOTE — ASSESSMENT & PLAN NOTE
Sternal precautions  PT/OT  Ambulate  Replace electrolytes PRN  Carvedilol, statin, asa  IV lasix 20mg BID- switch to PO with K   Likely D/C home today pending discussion with staff

## 2018-08-18 NOTE — SUBJECTIVE & OBJECTIVE
Interval History: No acute events overnight. UOP adequate. Tolerating diet.   SR per monitor. Pain moderately well controlled. Ambulating.    Medications:  Continuous Infusions:  Scheduled Meds:   albuterol-ipratropium  3 mL Nebulization Q4H    aspirin  325 mg Oral Daily    atorvastatin  40 mg Oral QHS    carvedilol  12.5 mg Oral BID WM    docusate sodium  100 mg Oral BID    furosemide  20 mg Intravenous BID    mupirocin  1 g Nasal BID    pantoprazole  40 mg Oral Before breakfast    polyethylene glycol  17 g Oral Daily    potassium chloride  10 mEq Oral Once    potassium chloride  20 mEq Oral Q12H    sodium chloride 0.9%  3 mL Intravenous Q8H     PRN Meds:acetaminophen, bisacodyl, magnesium sulfate IVPB, magnesium sulfate IVPB, metoclopramide HCl, ondansetron, oxyCODONE, oxyCODONE     Objective:     Vital Signs (Most Recent):  Temp: 98.4 °F (36.9 °C) (08/18/18 0444)  Pulse: 100 (08/18/18 0444)  Resp: (!) 21 (08/18/18 0444)  BP: 109/71 (08/18/18 0444)  SpO2: (!) 92 % (08/18/18 0425) Vital Signs (24h Range):  Temp:  [97.2 °F (36.2 °C)-98.4 °F (36.9 °C)] 98.4 °F (36.9 °C)  Pulse:  [] 100  Resp:  [16-21] 21  SpO2:  [87 %-97 %] 92 %  BP: ()/(50-71) 109/71     Weight: 80.6 kg (177 lb 11.1 oz)  Body mass index is 27.02 kg/m².    SpO2: (!) 92 %  O2 Device (Oxygen Therapy): room air    Intake/Output - Last 3 Shifts       08/16 0700 - 08/17 0659 08/17 0700 - 08/18 0659 08/18 0700 - 08/19 0659    P.O. 1140 1440     Total Intake(mL/kg) 1140 (14.1) 1440 (17.9)     Urine (mL/kg/hr) 2300 (1.2) 2840 (1.5)     Stool 0 0     Chest Tube       Total Output 2300 2840     Net -1160 -1400            Stool Occurrence 0 x 0 x           Lines/Drains/Airways     Line                 Pacer Wires 08/13/18 1900 4 days          Peripheral Intravenous Line                 Peripheral IV - Single Lumen 08/13/18 1230 Left Forearm 4 days                Physical Exam   Constitutional: He is oriented to person, place, and time.  He appears well-developed and well-nourished.   HENT:   Head: Normocephalic and atraumatic.   Eyes: EOM are normal.   Neck: Neck supple.   Cardiovascular: Normal rate.   Pulmonary/Chest: Effort normal.   Abdominal: Soft.   Musculoskeletal: Normal range of motion.   Neurological: He is alert and oriented to person, place, and time.   Skin: Skin is warm and dry.   Nursing note and vitals reviewed.      Significant Labs:  BMP:   Recent Labs   Lab  08/18/18   0549   GLU  100   NA  137   K  3.9   CL  96   CO2  33*   BUN  24*   CREATININE  0.8   CALCIUM  9.3   MG  2.0     CBC:   Recent Labs   Lab  08/18/18   0549   WBC  9.72   RBC  3.51*   HGB  10.6*   HCT  32.1*   PLT  188   MCV  92   MCH  30.2   MCHC  33.0

## 2018-08-18 NOTE — PLAN OF CARE
Problem: Patient Care Overview  Goal: Plan of Care Review  Outcome: Ongoing (interventions implemented as appropriate)  Plan of care discussed with patient.  VS stable. Patient ambulating with assistance, fall precautions in place. Continuing to encourage sternal precautions, IS, and ambulation. Pt wife at beside. Patient has complaints of pain PRN pain medication given. Pt had all CT removed today 8/16 as well as fierro. Discussed medications and care. Patient has no questions at this time. Will continue to monitor. Possible D/c in the AM.

## 2018-08-18 NOTE — PROGRESS NOTES
[facility]  Cardiothoracic Surgery  Progress Note    Patient Name: Anjum Campbell  MRN: [id number]  Admission Date: 8/13/2018  Hospital Length of Stay: 5 days  Code Status: Full Code   Attending Physician: Orville Payne MD   Referring Provider: Orville Payne MD  Principal Problem:CAD (coronary artery disease)            Subjective:     Post-Op Info:  Procedure(s) (LRB):  CABGx1 (N/A)  EXCISION, MASS, MEDIASTINUM   5 Days Post-Op     Interval History: No acute events overnight. UOP adequate. Tolerating diet.   SR per monitor. Pain moderately well controlled. Ambulating.    Medications:  Continuous Infusions:  Scheduled Meds:   albuterol-ipratropium  3 mL Nebulization Q4H    aspirin  325 mg Oral Daily    atorvastatin  40 mg Oral QHS    carvedilol  12.5 mg Oral BID WM    docusate sodium  100 mg Oral BID    furosemide  20 mg Intravenous BID    mupirocin  1 g Nasal BID    pantoprazole  40 mg Oral Before breakfast    polyethylene glycol  17 g Oral Daily    potassium chloride  10 mEq Oral Once    potassium chloride  20 mEq Oral Q12H    sodium chloride 0.9%  3 mL Intravenous Q8H     PRN Meds:acetaminophen, bisacodyl, magnesium sulfate IVPB, magnesium sulfate IVPB, metoclopramide HCl, ondansetron, oxyCODONE, oxyCODONE     Objective:     Vital Signs (Most Recent):  Temp: 98.4 °F (36.9 °C) (08/18/18 0444)  Pulse: 100 (08/18/18 0444)  Resp: (!) 21 (08/18/18 0444)  BP: 109/71 (08/18/18 0444)  SpO2: (!) 92 % (08/18/18 0425) Vital Signs (24h Range):  Temp:  [97.2 °F (36.2 °C)-98.4 °F (36.9 °C)] 98.4 °F (36.9 °C)  Pulse:  [] 100  Resp:  [16-21] 21  SpO2:  [87 %-97 %] 92 %  BP: ()/(50-71) 109/71     Weight: 80.6 kg (177 lb 11.1 oz)  Body mass index is 27.02 kg/m².    SpO2: (!) 92 %  O2 Device (Oxygen Therapy): room air    Intake/Output - Last 3 Shifts       08/16 0700 - 08/17 0659 08/17 0700 - 08/18 0659 08/18 0700 - 08/19 0659    P.O. 1140 1440     Total Intake(mL/kg) 1140  (14.1) 1440 (17.9)     Urine (mL/kg/hr) 2300 (1.2) 2840 (1.5)     Stool 0 0     Chest Tube       Total Output 2300 2840     Net -1160 -1400            Stool Occurrence 0 x 0 x           Lines/Drains/Airways     Line                 Pacer Wires 08/13/18 1900 4 days          Peripheral Intravenous Line                 Peripheral IV - Single Lumen 08/13/18 1230 Left Forearm 4 days                Physical Exam   Constitutional: He is oriented to person, place, and time. He appears well-developed and well-nourished.   HENT:   Head: Normocephalic and atraumatic.   Eyes: EOM are normal.   Neck: Neck supple.   Cardiovascular: Normal rate.   Pulmonary/Chest: Effort normal.   Abdominal: Soft.   Musculoskeletal: Normal range of motion.   Neurological: He is alert and oriented to person, place, and time.   Skin: Skin is warm and dry.   Nursing note and vitals reviewed.      Significant Labs:  BMP:   Recent Labs   Lab  08/18/18   0549   GLU  100   NA  137   K  3.9   CL  96   CO2  33*   BUN  24*   CREATININE  0.8   CALCIUM  9.3   MG  2.0     CBC:   Recent Labs   Lab  08/18/18   0549   WBC  9.72   RBC  3.51*   HGB  10.6*   HCT  32.1*   PLT  188   MCV  92   MCH  30.2   MCHC  33.0         Assessment/Plan:     * CAD (coronary artery disease)    Continue statin          Stress hyperglycemia    Endocrine following.        S/P CABG (coronary artery bypass graft)    Sternal precautions  PT/OT  Ambulate  Replace electrolytes PRN  Carvedilol, statin, asa  IV lasix 20mg BID- switch to PO with K   Likely D/C home today pending discussion with staff        Tobacco abuse    Patient has been a life long smoker and continues to smoke.  Sating well on room air            Joellen Root MD  Cardiothoracic Surgery  [facility]

## 2018-08-20 ENCOUNTER — TELEPHONE (OUTPATIENT)
Dept: CARDIOTHORACIC SURGERY | Facility: CLINIC | Age: 58
End: 2018-08-20

## 2018-08-20 DIAGNOSIS — Z95.1 S/P CABG (CORONARY ARTERY BYPASS GRAFT): Primary | ICD-10-CM

## 2018-08-20 NOTE — PT/OT/SLP DISCHARGE
Occupational Therapy Discharge Summary    Anjum Campbell  MRN: [id number]   Principal Problem: CAD (coronary artery disease)      Patient Discharged from acute Occupational Therapy on 8/18/2018.  Please refer to prior OT note dated 8/17/2018 for functional status.    Assessment:      Patient was discharged unexpectedly.  Information required to complete an accurate discharge summary is unknown.  Refer to therapy initial evaluation and last progress note for initial and most recent functional status and goal achievement.  Recommendations made may be found in medical record.    Objective:     GOALS:   Multidisciplinary Problems     Occupational Therapy Goals     Not on file          Multidisciplinary Problems (Resolved)        Problem: Occupational Therapy Goal    Goal Priority Disciplines Outcome Interventions   Occupational Therapy Goal   (Resolved)     OT, PT/OT Outcome(s) achieved    Description:  Goals to be met by: 8/29/2018    Pt will complete LB dressing with supervision.  Pt will complete UB dressing with independence.  Pt will complete toilet transfer with supervision.  Pt will verbalize 3/3 sternal precautions with <2 vc's in prep for safe toilet transfer.  -- Met   Pt will complete supine with HOB flat to seated at EOB with SBA in prep for seated grooming task.  Pt will engage in functional mobility to simulate household and community mobility distances with SBA in order to maximize functional activity tolerance required for engagement in occupations of choice.  -- Met                     Reasons for Discontinuation of Therapy Services  Transfer to alternate level of care.      Plan:     Patient Discharged to: Home no OT services needed    Brittani Spicer OT  8/20/2018

## 2018-08-20 NOTE — PHYSICIAN QUERY
PT Name: Anjum Campbell  MR #: [id number]    Physician Query Form - Pathology Findings Clarification     CDS/: Ariadne Weber               Contact information: [email or web address]  This form is a permanent document in the medical record.     Query Date: August 20, 2018      By submitting this query, we are merely seeking further clarification of documentation.  Please utilize your independent clinical judgment when addressing the question(s) below.      The medical record contains the following:     Findings Supporting Clinical Information Location in Medical Record   THYMOMA, MIXED TYPE (AB) mediastinal mass      EXCISION OF A MEDIASTINAL MASS SHOWING A THYMOMA, MIXED TYPE (AB). NO EVIDENCE OF EXTRACAPSULAR INVASION IDENTIFIED. Pathology 8/13    Pathology 8/13     Please document the clinical significance of the Pathologists findings of THYMOMA, MIXED TYPE (AB) mediastinal mass _.          [X  ] I agree with the Pathology Findings        [  ] I do not agree with the Pathology Findings        [  ] Clinically Insignificant        [  ] Clinically Undetermined        [  ] Other/Clarification of Findings: ______________________________________________    Please document in your progress notes daily for the duration of treatment until resolved and include in your discharge summary.

## 2018-08-20 NOTE — PT/OT/SLP DISCHARGE
Physical Therapy Discharge Summary    Name: Anjum Campbell  MRN: [id number]   Principal Problem: CAD (coronary artery disease)     Patient Discharged from acute Physical Therapy on 2018.  Please refer to prior PT noted date on 2018 for functional status.     Assessment:     Patient was discharged unexpectedly.  Information required to complete an accurate discharge summary is unknown.  Refer to therapy initial evaluation and last progress note for initial and most recent functional status and goal achievement.  Recommendations made may be found in medical record.    Objective:     GOALS:   Multidisciplinary Problems     Physical Therapy Goals     Not on file          Multidisciplinary Problems (Resolved)        Problem: Physical Therapy Goal    Goal Priority Disciplines Outcome Goal Variances Interventions   Physical Therapy Goal   (Resolved)     PT, PT/OT Outcome(s) achieved     Description:  Goals to be met by: 18    Patient will increase functional independence with mobility by performin. Supine to sit with Stand-by Assistance -not met  2. Sit to stand transfer with Supervision-not met  3. Gait  x 250 feet with Supervision -not met                      Reasons for Discontinuation of Therapy Services  Transfer to alternate level of care.      Plan:     Patient Discharged to: Home no PT services needed.    Carmen Nolen, PT, DPT  2018  [phone]

## 2018-08-20 NOTE — TELEPHONE ENCOUNTER
Called pt following hospital discharge; spoke with pt's wife.  Reiterated the need for pt to clean his incision everyday with soap and water, and to walk as much as he can.  Reminded pt's wife that pt cannot drive for the first 4 weeks after surgery, and to refrain from lifting, pushing, and pulling anything greater than 5 pounds for the first 6 weeks following his surgery.  Instructed pt's wife for pt to perform daily weights.  Pt's wife instructed to notify the clinic if pt has a weight gain greater than 3 pounds in one day.  Pt's wife instructed to call the clinic with any questions or concerns.  Pt's wife verbalized understanding.

## 2018-08-29 ENCOUNTER — TELEPHONE (OUTPATIENT)
Dept: CARDIOTHORACIC SURGERY | Facility: CLINIC | Age: 58
End: 2018-08-29

## 2018-08-29 NOTE — TELEPHONE ENCOUNTER
Spoke to patient and wife. Patient reports chest pain (isolated to chest) 8/10 X1, and chest pain (accompanied by jaw pain) 6/10 X2, for which he has used Nitro, prescribed by his Cardiologist Dr. Trevizo. Patient states Dr. Trevizo will not refill Rx, pt is requesting [facility] to refill Nitro. Per Faby Murray NP, patient is likely not experiencing true angina, but rather pain associated with surgery as well as surgical incision. Patient believes chest pain may be caused by other cardiac vessel blockages that were not addressed during last surgery. Patient recommended to take Percocet and Ibuprofen to relieve pain. Will call back patient once Dr. Payne is notified of patient's concerns. Patient verbalized understanding.       ----- Message from Francisca Saldaña sent at 8/29/2018 12:48 PM CDT -----  Contact: harsh/wife  [phone]//caller states that she needs to speak with nurse in ref to some questions the pt has since his surgery//please call/thank you

## 2018-08-30 ENCOUNTER — TELEPHONE (OUTPATIENT)
Dept: CARDIOTHORACIC SURGERY | Facility: CLINIC | Age: 58
End: 2018-08-30

## 2018-08-30 NOTE — TELEPHONE ENCOUNTER
Dr. Payne has been made aware of pt's symptoms of continued chest pain, and has determined additional post op testing is not necessary prior to his appt on 9-18

## 2018-09-18 ENCOUNTER — HOSPITAL ENCOUNTER (OUTPATIENT)
Dept: CARDIOLOGY | Facility: CLINIC | Age: 58
Discharge: HOME | End: 2018-09-18
Payer: COMMERCIAL

## 2018-09-18 ENCOUNTER — HOSPITAL ENCOUNTER (OUTPATIENT)
Dept: RADIOLOGY | Facility: HOSPITAL | Age: 58
Discharge: HOME | End: 2018-09-18
Attending: THORACIC SURGERY (CARDIOTHORACIC VASCULAR SURGERY)
Payer: COMMERCIAL

## 2018-09-18 ENCOUNTER — OFFICE VISIT (OUTPATIENT)
Dept: CARDIOTHORACIC SURGERY | Facility: CLINIC | Age: 58
End: 2018-09-18
Payer: COMMERCIAL

## 2018-09-18 ENCOUNTER — DOCUMENTATION ONLY (OUTPATIENT)
Dept: CARDIOTHORACIC SURGERY | Facility: CLINIC | Age: 58
End: 2018-09-18

## 2018-09-18 VITALS
OXYGEN SATURATION: 99 % | SYSTOLIC BLOOD PRESSURE: 122 MMHG | HEIGHT: 68 IN | WEIGHT: 167.56 LBS | TEMPERATURE: 98 F | DIASTOLIC BLOOD PRESSURE: 84 MMHG | HEART RATE: 95 BPM | BODY MASS INDEX: 25.39 KG/M2

## 2018-09-18 DIAGNOSIS — Z95.1 S/P CABG (CORONARY ARTERY BYPASS GRAFT): ICD-10-CM

## 2018-09-18 DIAGNOSIS — Z95.1 S/P CABG (CORONARY ARTERY BYPASS GRAFT): Primary | ICD-10-CM

## 2018-09-18 DIAGNOSIS — D49.89 THYMOMA: Primary | ICD-10-CM

## 2018-09-18 PROCEDURE — 99999 PR PBB SHADOW E&M-EST. PATIENT-LVL III: CPT | Mod: PBBFAC,,, | Performed by: THORACIC SURGERY (CARDIOTHORACIC VASCULAR SURGERY)

## 2018-09-18 PROCEDURE — 99024 POSTOP FOLLOW-UP VISIT: CPT | Mod: S$GLB,,, | Performed by: THORACIC SURGERY (CARDIOTHORACIC VASCULAR SURGERY)

## 2018-09-18 PROCEDURE — 71046 X-RAY EXAM CHEST 2 VIEWS: CPT | Mod: 26,,, | Performed by: RADIOLOGY

## 2018-09-18 PROCEDURE — 93000 ELECTROCARDIOGRAM COMPLETE: CPT | Mod: S$GLB,,, | Performed by: INTERNAL MEDICINE

## 2018-09-18 PROCEDURE — 71046 X-RAY EXAM CHEST 2 VIEWS: CPT | Mod: TC,FY

## 2018-09-18 NOTE — PROGRESS NOTES
Reminded patient to keep incision clean and dry, and to adhere to the 5 lb weight restriction for 6 wks post surgery, then 20lb restriction for 6 additional wks. Patient verbalized understanding. Patient cleared to drive. Patient has been set up with Cardiologist appt within 30 days of surgery. Phase II Cardiac Rehab orders placed.

## 2018-09-18 NOTE — LETTER
[facility] - Cardiovascular Surg  [address]  [facility] [address]  Phone: [phone] September 18, 2018      Gerardo Trevizo MD  [address] [place] [address] [address]  [facility] [address]    Patient: Anjum Campbell   MR Number: [id number]   YOB: 1960   Date of Visit: 9/18/2018     Dear Dr. Trevizo,     I had the pleasure seeing your patient Mr. Anjum Campbell in clinic today.  As you know, he is a very pleasant 58-year-old gentleman who initially presented with classic angina.  Unfortunately, he had both a negative nuclear study and oddly enough a negative PET scan.  However, given his symptoms I recommended surgical revascularization nonetheless.      On August 13th he underwent single vessel coronary artery bypass grafting with the left anterior mammary to left anterior descending.  At the time of the operation, we identified an anterior mediastinal mass.  This was resected and sent for pathology.      Today he returned for routine follow-up, and in clinic today Mr. Campbell looked fabulous.  Interestingly, he reports that for the past several weeks he has had no recurrence of his chest and jaw pain as he had preoperatively.  On physical examination, his sternum appeared to be healing nicely.  His chest x-ray was clear.  His EKG demonstrated a normal sinus rhythm.  Overall, I am very pleased with how well Mr. Campbell has done.  As result, I did not schedule him to follow up with me.  Certainly, should you or he have any questions or concerns please do not hesitate to give me a call.      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  I do not believe that any additional treatment is necessary now that it is been excised, but I did ask Dr. Trotter to see him to confirm this.      Thank you again for sending this pleasant gentleman to me.    Sincerely,      Orville Payne MD  Chief, Division of Thoracic & Cardiovascular Surgery  [facility]    PEP/hcr    CC  Enmanuel Doe MD

## 2018-09-18 NOTE — LETTER
September 18, 2018        KORI GREY STAFF             [facility] - Cardiovascular Surg  [address]  [facility] [address]  Phone: [phone]   Patient: Anjum Campbell   MR Number: [id number]   YOB: 1960   Date of Visit: 9/18/2018       Dear  Staff:    Thank you for referring Anjum Campbell to me for evaluation. Below are the relevant portions of my assessment and plan of care.            If you have questions, please do not hesitate to call me. I look forward to following Anjum along with you.    Sincerely,      Orville Payne MD           CC  No Recipients

## 2018-09-27 ENCOUNTER — HOSPITAL ENCOUNTER (OUTPATIENT)
Dept: RADIOLOGY | Facility: HOSPITAL | Age: 58
Discharge: HOME | End: 2018-09-27
Attending: INTERNAL MEDICINE
Payer: COMMERCIAL

## 2018-09-27 ENCOUNTER — INITIAL CONSULT (OUTPATIENT)
Dept: HEMATOLOGY/ONCOLOGY | Facility: CLINIC | Age: 58
End: 2018-09-27
Payer: COMMERCIAL

## 2018-09-27 VITALS
RESPIRATION RATE: 18 BRPM | HEART RATE: 87 BPM | TEMPERATURE: 98 F | OXYGEN SATURATION: 99 % | DIASTOLIC BLOOD PRESSURE: 87 MMHG | BODY MASS INDEX: 26.78 KG/M2 | SYSTOLIC BLOOD PRESSURE: 137 MMHG | WEIGHT: 170.63 LBS | HEIGHT: 67 IN

## 2018-09-27 DIAGNOSIS — C37 TYPE AB THYMOMA: ICD-10-CM

## 2018-09-27 PROCEDURE — 71260 CT THORAX DX C+: CPT | Mod: 26,,, | Performed by: RADIOLOGY

## 2018-09-27 PROCEDURE — 3008F BODY MASS INDEX DOCD: CPT | Mod: CPTII,S$GLB,, | Performed by: INTERNAL MEDICINE

## 2018-09-27 PROCEDURE — 3075F SYST BP GE 130 - 139MM HG: CPT | Mod: CPTII,S$GLB,, | Performed by: INTERNAL MEDICINE

## 2018-09-27 PROCEDURE — 99204 OFFICE O/P NEW MOD 45 MIN: CPT | Mod: S$GLB,,, | Performed by: INTERNAL MEDICINE

## 2018-09-27 PROCEDURE — 99999 PR PBB SHADOW E&M-EST. PATIENT-LVL IV: CPT | Mod: PBBFAC,,, | Performed by: INTERNAL MEDICINE

## 2018-09-27 PROCEDURE — 25500020 PHARM REV CODE 255: Performed by: INTERNAL MEDICINE

## 2018-09-27 PROCEDURE — 71260 CT THORAX DX C+: CPT | Mod: TC

## 2018-09-27 PROCEDURE — 3079F DIAST BP 80-89 MM HG: CPT | Mod: CPTII,S$GLB,, | Performed by: INTERNAL MEDICINE

## 2018-09-27 RX ADMIN — IOHEXOL 75 ML: 350 INJECTION, SOLUTION INTRAVENOUS at 04:09

## 2018-09-27 NOTE — PROGRESS NOTES
DATE OF CONSULTATION:  2018    REASON FOR VISIT:  New diagnosis of thymoma.    REFERRING PHYSICIAN:  Dr. Payne with Thoracic Surgery.    HISTORY OF PRESENT ILLNESS:  Mr. Anjum Campbell is a 58-year-old male who   underwent coronary artery bypass graft on 2018 and during the time of   surgery was noted to have a mediastinal mass, which was excised.  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  He comes in to discuss further management.  He is recovered   completely from his bypass graft and is accompanied to the clinic today with his   wife.    REVIEW OF SYSTEMS:  CONSTITUTIONAL:  Negative for appetite change, fatigue and unexpected weight   change.  HEENT:  Negative for mouth sores.  EYES:  Negative for visual disturbance.  RESPIRATORY:  Negative for cough and shortness of breath.  CARDIOVASCULAR:  Negative for chest pain.  GASTROINTESTINAL:  Negative for abdominal pain, diarrhea and abdominal   distention.  GENITOURINARY:  Negative for frequency.  MUSCULOSKELETAL:  Negative for back pain.  SKIN:  Negative for rash.  NEUROLOGIC:  Negative for headaches.  HEMATOLOGIC:  Negative for adenopathy.  PSYCHIATRIC AND BEHAVIORAL:  Not nervous or anxious.    COMORBID MEDICAL CONDITIONS:  Include chest pain, congestive heart failure,   hypertension.    PAST SURGICAL HISTORY:  Back fusion, catheterization of both left and right   heart, coronary artery bypass graft.    FAMILY HISTORY:  Father  with lung cancer.  Mother alive.    SOCIAL HISTORY:  Continues to smoke.  He notes that he is only smoking three   cigarettes per day.  Denies alcohol use.    PHYSICAL EXAMINATION:  VITAL SIGNS:  Reviewed in EPIC.  GENERAL:  The patient is oriented to person, place and time.  Denies any acute   distress.  HEENT:  External ears are normal.  Oropharynx is clear and moist, no   oropharyngeal exudate.  No sinus tenderness.  EYES:  Conjunctivae and lids  are normal.  NECK:  Supple, no JVD, no thyromegaly.  CARDIOVASCULAR:  Normal rate, regular rhythm.  Normal heart sounds.  Intact   distal pulses.  No edema and no tenderness in the extremities.  PULMONARY AND CHEST:  Bilateral equal breath sounds heard, no rales, rhonchi or   wheezes.  ABDOMEN:  Also evidence of coronary artery bypass, well-healed scar noted in the   mid sternum.  ABDOMEN:  Soft, nontender, nondistended.  No hepatomegaly or splenomegaly.  MUSCULOSKELETAL:  Normal range of motion.  Gait is normal.  No clubbing or   cyanosis.  LYMPHADENOPATHY:  No submental, submandibular, cervical, supraclavicular lymph   nodes noted.  NEUROLOGIC:  Alert and oriented to person, place and time.  Normal strength,   normal reflexes, No sensory deficit.  Gait is normal.  SKIN:  Warm, dry and intact.  No bruising, no lesions, no rashes.  Nail showed   no clubbing.  PSYCHIATRIC:  Normal mood and affect.  Speech, behavior, judgment, thought   content, cognition and memory are normal.    LABORATORY DATA:  From 08/18/2018 reveals a CBC with a hemoglobin of 10.6,   hematocrit 32.1, WBC 9.7, platelets 188.  BMP with CO2 of 33, BUN 24.    ASSESSMENT AND PLAN:  Mr. Anjum Campbell is a 58-year-old male with a new   diagnosis of stage I thymoma.  He does not require any additional treatment.    Since he did not have a CT chest at diagnosis, we will go ahead and order the CT   chest, which is being scheduled for today and we will call him with results.  I   will plan on seeing him back in six months with a repeat CT chest.  He will be   followed every six months for two years and then annually for 10 years.  The   rationale for this was discussed with the patient and his accompanying wife and   all questions were answered to their satisfaction.  Distress score is 5 and no   intervention is indicated.      SPS/HN  dd: 09/27/2018 14:41:54 (CDT)  td: 09/28/2018 00:29:00 (CDT)  Doc ID   #[id number]  Job ID #[id number]    CC:       Distress  Score    Distress Score: 5        Practical Problems Physical Problems   : No Appearance: No   Housing: No Bathing / Dressing: No   Insurance / Financial: No Breathing: No    Transportation: No  Changes in Urination: No    Work / School: No  Constipation: No   Treatment Decisions: No  Diarrhea: No     Eating: No    Family Problems Fatigue: No    Dealing with Children: No Feeling Swollen: No    Dealing with Partner: No Fevers: No    Ability to Have Children: No  Getting Around: No    Family Health Issues: No  Indigestion: No     Memory / Concentration: No   Emotional Problems Mouth Sores: No    Depression: No  Nausea: No    Fears: No  Nose Dry / Congested: No    Nervousness: No  Pain: No    Sadness: No Sexual: No    Worry: Yes Skin Dry / Itchy: No    Loss of Interest in Usual Activities: No Sleep: No     Substance Abuse: No    Spiritual/Religions Concerns Tingling in Hands / Feet: No   Spritual / Congregational Concerns: No         Other Problems            Dictated # [id number]

## 2018-09-27 NOTE — LETTER
September 27, 2018      Orville Payne MD  [address]  [place] [address]           [facility] Hematology Oncology  [address]  [place] [address]  Phone: [phone]          Patient: Anjmu Campbell   MR Number: [id number]   YOB: 1960   Date of Visit: 9/27/2018       Dear Dr. Orville Payne:    Thank you for referring Anjum Campbell to me for evaluation. Attached you will find relevant portions of my assessment and plan of care.    If you have questions, please do not hesitate to call me. I look forward to following Anjum Campbell along with you.    Sincerely,    Sara Trotter MD    Enclosure  CC:  No Recipients    If you would like to receive this communication electronically, please contact [email or web address] or [phone] to request more information on [employer] access.    For providers and/or their staff who would like to refer a patient to [facility], please contact us through our one-stop-shop provider referral line, [facility], at [phone].    If you feel you have received this communication in error or would no longer like to receive these types of communications, please e-mail [email or web address]

## 2018-10-01 ENCOUNTER — TELEPHONE (OUTPATIENT)
Dept: HEMATOLOGY/ONCOLOGY | Facility: CLINIC | Age: 58
End: 2018-10-01

## 2018-10-01 DIAGNOSIS — C37 TYPE AB THYMOMA: Primary | ICD-10-CM

## 2018-10-01 DIAGNOSIS — E27.8 ADRENAL MASS: ICD-10-CM

## 2018-10-01 NOTE — TELEPHONE ENCOUNTER
Reviewed CT chest, need CT adrenal mass protocol  Orders are in. He said he will call us back to set up CT abdomen appointment

## 2018-10-05 ENCOUNTER — OFFICE VISIT (OUTPATIENT)
Dept: CARDIOLOGY | Facility: CLINIC | Age: 58
End: 2018-10-05
Payer: COMMERCIAL

## 2018-10-05 VITALS
RESPIRATION RATE: 20 BRPM | WEIGHT: 169.75 LBS | HEART RATE: 106 BPM | BODY MASS INDEX: 26.35 KG/M2 | OXYGEN SATURATION: 96 % | SYSTOLIC BLOOD PRESSURE: 122 MMHG | DIASTOLIC BLOOD PRESSURE: 84 MMHG

## 2018-10-05 DIAGNOSIS — I50.22 CHRONIC SYSTOLIC CONGESTIVE HEART FAILURE: ICD-10-CM

## 2018-10-05 DIAGNOSIS — Z95.1 S/P CABG (CORONARY ARTERY BYPASS GRAFT): ICD-10-CM

## 2018-10-05 DIAGNOSIS — Z72.0 TOBACCO ABUSE: ICD-10-CM

## 2018-10-05 DIAGNOSIS — I25.10 CORONARY ARTERY DISEASE, ANGINA PRESENCE UNSPECIFIED, UNSPECIFIED VESSEL OR LESION TYPE, UNSPECIFIED WHETHER NATIVE OR TRANSPLANTED HEART: Primary | ICD-10-CM

## 2018-10-05 DIAGNOSIS — I10 ESSENTIAL HYPERTENSION: ICD-10-CM

## 2018-10-05 DIAGNOSIS — E78.5 DYSLIPIDEMIA: ICD-10-CM

## 2018-10-05 PROCEDURE — 3079F DIAST BP 80-89 MM HG: CPT | Mod: CPTII,S$GLB,, | Performed by: INTERNAL MEDICINE

## 2018-10-05 PROCEDURE — 3074F SYST BP LT 130 MM HG: CPT | Mod: CPTII,S$GLB,, | Performed by: INTERNAL MEDICINE

## 2018-10-05 PROCEDURE — 3008F BODY MASS INDEX DOCD: CPT | Mod: CPTII,S$GLB,, | Performed by: INTERNAL MEDICINE

## 2018-10-05 PROCEDURE — 99999 PR PBB SHADOW E&M-EST. PATIENT-LVL III: CPT | Mod: PBBFAC,,, | Performed by: INTERNAL MEDICINE

## 2018-10-05 PROCEDURE — 99214 OFFICE O/P EST MOD 30 MIN: CPT | Mod: S$GLB,,, | Performed by: INTERNAL MEDICINE

## 2018-10-05 NOTE — PROGRESS NOTES
Subjective:    Patient ID:  Anjum Campbell is a 58 y.o. male who presents for follow-up of Post-op Evaluation      HPI   Previous history:  Here follow-up coronary artery disease.  He still is having chest pains but has not been adherent to sedentary lifestyle.  He did have a PET scan which was unrevealing but he still is scheduled due to classic crescendo angina and previously reviewed angiogram showing proximal LAD disease.  He is mainly upset today that they delayed his surgery.  He is really worried and again we reassured him that this needs to be taken care of.  He we address his concerns.  He set up for surgery next week.  He denies any PND, orthopnea or lower Barbie.  He has not taken any dizziness, presyncope or syncope.  We discussed minimizing activity until the surgery.  He mainly is again worried about not working and lack of income at this point as well.  We did file his short-term disability papers.    Today:  Her follow-up coronary artery disease.  He underwent CABG by Dr. Payne.  He has some mild residual soreness at the sternotomy site but otherwise been doing well.  He is tolerating all his medicines.  He says still smoking upwards of a couple cigarettes per day.  We strongly advised him to discontinue this completely.  He denies any PND, orthopnea or lower extremity edema.  He has not experienced any dizziness, presyncope or syncope.      Review of Systems   Constitution: Negative.   HENT: Negative.    Eyes: Negative.    Cardiovascular: Positive for chest pain. Negative for dyspnea on exertion, irregular heartbeat, leg swelling, near-syncope, orthopnea, palpitations, paroxysmal nocturnal dyspnea and syncope.   Respiratory: Negative for shortness of breath.    Skin: Negative.    Musculoskeletal: Negative.    Gastrointestinal: Negative for abdominal pain, constipation and diarrhea.   Genitourinary: Negative for dysuria.   Neurological: Negative for dizziness.   Psychiatric/Behavioral:  Negative.         Objective:    Physical Exam   Constitutional: He is oriented to person, place, and time. He appears well-developed and well-nourished. No distress.   HENT:   Head: Normocephalic and atraumatic.   Eyes: Conjunctivae and EOM are normal. Pupils are equal, round, and reactive to light.   Neck: Normal range of motion. Neck supple. No thyromegaly present.   Cardiovascular: Normal rate, regular rhythm and normal heart sounds.   No murmur heard.  Pulmonary/Chest: Effort normal and breath sounds normal. No respiratory distress. He has no wheezes. He has no rales. He exhibits no tenderness.   Abdominal: Soft. Bowel sounds are normal.   Musculoskeletal: He exhibits no edema.   Neurological: He is alert and oriented to person, place, and time.   Skin: Skin is warm and dry.   Psychiatric: He has a normal mood and affect. His behavior is normal.       echo:  6-18  CONCLUSIONS     1 - Moderately-severely depressed left ventricular systolic function (EF 30%).  Global hypokinesis with lateral WMA.     2 - Concentric hypertrophy.     3 - Impaired LV relaxation, normal LAP (grade 1 diastolic dysfunction).     NST:  Impression: ABNORMAL MYOCARDIAL PERFUSION  1. There is a moderate to large size fixed defect of moderate intensity that extends from the base to the apical inferoseptal wall of the left ventricle, consistent with myocardial injury. There is trivial maverick-injury ischemia.   2. There is abnormal wall motion at rest showing moderate to severe global hypokinesis of the left ventricle.   3. There is resting LV dysfunction with a reduced ejection fraction of 28 %.   4. The ventricular volumes are normal at rest and stress.   5. The extracardiac distribution of radioactivity is normal.     C:  B. Summary/Post-Operative Diagnosis      1.   Two vessel coronary artery disease involving mainly proximal LAD and distal small non- dominant left circumflex.   2.   Normal right and left Filling Pressures.   3.   Normal  Pulmonary Hypertension.    D. Hemodynamic Results       AIR REST:  PW:  (16)  CO_THERM: 7.06  PA: 29  RV: 29  RA:  (5)  LVEDP: 9       E. Angiographic Results     Diagnostic:          Patient has a right dominant coronary artery.        - Left Main Coronary Artery:             The LM is normal. There is BRITTANY 3 flow.     - Left Anterior Descending Artery:             The proximal LAD has a 80% stenosis. There is BRITTANY 3 flow.     - Left Circumflex Artery:             The distal LCX has a 90% stenosis. There is BRITTANY 3 flow. Small nondominant vessel distally of point of stenosis     - Right Coronary Artery:             The RCA has luminal irregularities. There is BRITTANY 3 flow.     - Common Femoral Artery:             The right CFA is normal.    Assessment:       1. Coronary artery disease, angina presence unspecified, unspecified vessel or lesion type, unspecified whether native or transplanted heart    2. S/P CABG (coronary artery bypass graft)    3. Chronic systolic congestive heart failure    4. Essential hypertension    5. Dyslipidemia    6. Tobacco abuse         Plan:       -Continue medical therapy  -status post LIMA to LAD  -referred to cardiac rehab  -Counseled on tobacco cessation  -will fill out paperwork for short-term disability    Return to clinic in 1 month

## 2018-10-17 ENCOUNTER — OFFICE VISIT (OUTPATIENT)
Dept: FAMILY MEDICINE | Facility: CLINIC | Age: 58
End: 2018-10-17
Payer: COMMERCIAL

## 2018-10-17 VITALS
HEIGHT: 67 IN | BODY MASS INDEX: 27.12 KG/M2 | SYSTOLIC BLOOD PRESSURE: 126 MMHG | OXYGEN SATURATION: 96 % | HEART RATE: 86 BPM | TEMPERATURE: 98 F | RESPIRATION RATE: 16 BRPM | WEIGHT: 172.81 LBS | DIASTOLIC BLOOD PRESSURE: 68 MMHG

## 2018-10-17 DIAGNOSIS — J41.0 SIMPLE CHRONIC BRONCHITIS: Primary | ICD-10-CM

## 2018-10-17 DIAGNOSIS — I25.118 CORONARY ARTERY DISEASE OF NATIVE ARTERY OF NATIVE HEART WITH STABLE ANGINA PECTORIS: ICD-10-CM

## 2018-10-17 DIAGNOSIS — M54.16 LUMBAR RADICULOPATHY: ICD-10-CM

## 2018-10-17 DIAGNOSIS — Z72.0 TOBACCO ABUSE: ICD-10-CM

## 2018-10-17 PROCEDURE — 3008F BODY MASS INDEX DOCD: CPT | Mod: CPTII,S$GLB,, | Performed by: FAMILY MEDICINE

## 2018-10-17 PROCEDURE — 3074F SYST BP LT 130 MM HG: CPT | Mod: CPTII,S$GLB,, | Performed by: FAMILY MEDICINE

## 2018-10-17 PROCEDURE — 99214 OFFICE O/P EST MOD 30 MIN: CPT | Mod: 25,S$GLB,, | Performed by: FAMILY MEDICINE

## 2018-10-17 PROCEDURE — 3078F DIAST BP <80 MM HG: CPT | Mod: CPTII,S$GLB,, | Performed by: FAMILY MEDICINE

## 2018-10-17 PROCEDURE — 99999 PR PBB SHADOW E&M-EST. PATIENT-LVL III: CPT | Mod: PBBFAC,,, | Performed by: FAMILY MEDICINE

## 2018-10-17 RX ORDER — HYDROCODONE BITARTRATE AND ACETAMINOPHEN 10; 325 MG/1; MG/1
1 TABLET ORAL EVERY 8 HOURS PRN
Qty: 30 TABLET | Refills: 0 | Status: SHIPPED | OUTPATIENT
Start: 2018-10-17 | End: 2018-10-27

## 2018-10-17 RX ORDER — IBUPROFEN 600 MG/1
600 TABLET ORAL 3 TIMES DAILY
Qty: 90 TABLET | Refills: 1 | Status: SHIPPED | OUTPATIENT
Start: 2018-10-17 | End: 2022-05-25

## 2018-10-17 RX ORDER — AMOXICILLIN AND CLAVULANATE POTASSIUM 875; 125 MG/1; MG/1
1 TABLET, FILM COATED ORAL 2 TIMES DAILY
Qty: 20 TABLET | Refills: 0 | Status: SHIPPED | OUTPATIENT
Start: 2018-10-17 | End: 2018-10-27

## 2018-10-17 NOTE — PROGRESS NOTES
Routine Office Visit    Patient Name: Anjum Campbell    : 1960  MRN: [id number]    Subjective:  Anjum is a 58 y.o. male who presents today for:    1. Cough  Patient presenting today with cough and body aches x 1 day.  He is known to have CAD and is a smoker.  He has never officially been diagnosed with COPD.  He is not on any inhalers.  No documented fevers, but did have chills and sweats earlier.  His sister recently visited him and had bronchitis.  The cough is not productive.  No hemoptysis or cough induced emesis.      Past Medical History  Past Medical History:   Diagnosis Date    Back injury     Chest pain     CHF (congestive heart failure)     Hypertension     Thymoma        Past Surgical History  Past Surgical History:   Procedure Laterality Date    back fusion       lumbar spine    CABGx1 N/A 2018    Performed by Orville Payne MD at [facility] OR [address] [place] [address]    CATHETERIZATION OF BOTH LEFT AND RIGHT HEART Bilateral 2018    Procedure: Heart Cath-Bilateral;  Surgeon: Gerardo Trevizo MD;  Location: [facility] CATH LAB;  Service: Cardiology;  Laterality: Bilateral;  RN PREOP 2018    CORONARY ARTERY BYPASS GRAFT (CABG) N/A 2018    Procedure: CABGx1;  Surgeon: Orville Payne MD;  Location: [facility] OR [address] [place] [address];  Service: Cardiovascular;  Laterality: N/A;    EXCISION, MASS, MEDIASTINUM  2018    Performed by Orville Payne MD at [facility] OR [address] [place] [address]    Heart Cath-Bilateral Bilateral 2018    Performed by Gerardo Tre[facility]izo MD at [facility] CATH LAB    INJECTION-STEROID-EPIDURAL-CAUDAL N/A 2016    [facility]ul[facility] at [facility] OR    MEDIASTINAL MASS EXCISION  2018    Procedure: EXCISION, MASS, MEDIASTINUM;  Surgeon: Orville Payne MD;  Location: [facility] OR [address] [place] [address];  Service: Cardiovascular;;       Family History  Family History   Problem Relation Age of Onset    Lung cancer Father        Social History  Social History     Socioeconomic  History    Marital status:      Spouse name: Not on file    Number of children: Not on file    Years of education: Not on file    Highest education level: Not on file   Social Needs    Financial resource strain: Not on file    Food insecurity - worry: Not on file    Food insecurity - inability: Not on file    Transportation needs - medical: Not on file    Transportation needs - non-medical: Not on file   Occupational History    Not on file   Tobacco Use    Smoking status: Current Every Day Smoker     Packs/day: 1.00     Years: 30.00     Pack years: 30.00     Types: Cigarettes    Smokeless tobacco: Never Used    Tobacco comment: 9/27/18 ---only a few cigs per day, trying to quit   Substance and Sexual Activity    Alcohol use: Yes     Comment: occaSIONAL    Drug use: No    Sexual activity: Not on file   Other Topics Concern    Not on file   Social History Narrative    Not on file       Current Medications  Current Outpatient Medications on File Prior to Visit   Medication Sig Dispense Refill    aspirin (ECOTRIN) 325 MG EC tablet Take 1 tablet (325 mg total) by mouth once daily.  0    atorvastatin (LIPITOR) 40 MG tablet Take 1 tablet (40 mg total) by mouth once daily. 90 tablet 3    carvedilol (COREG) 12.5 MG tablet Take 2 tablets (25 mg total) by mouth 2 (two) times daily with meals. 120 tablet 11    [DISCONTINUED] HYDROcodone-acetaminophen (NORCO)  mg per tablet Take 1 tablet by mouth every 8 (eight) hours as needed. 30 tablet 0    [DISCONTINUED] ibuprofen (ADVIL,MOTRIN) 200 MG tablet Take 200 mg by mouth every 6 (six) hours as needed for Pain.       No current facility-administered medications on file prior to visit.        Allergies   Review of patient's allergies indicates:  No Known Allergies    Review of Systems (Pertinent positives)  Review of Systems   Constitutional: Positive for chills, diaphoresis and malaise/fatigue.   HENT: Negative.    Eyes: Negative.    Respiratory:  "Positive for cough. Negative for hemoptysis and wheezing.    Cardiovascular: Negative.    Gastrointestinal: Negative.    Musculoskeletal: Positive for myalgias.   Skin: Negative.    Neurological: Negative.          /68 (BP Location: Right arm, Patient Position: Sitting, BP Method: Medium (Manual))   Pulse 86   Temp 98 °F (36.7 °C) (Oral)   Resp 16   Ht 5' 7" (1.702 m)   Wt 78.4 kg (172 lb 13.5 oz)   SpO2 96%   BMI 27.07 kg/m²     GENERAL APPEARANCE: in no apparent distress and well developed and well nourished  HEENT: PERRL, EOMI, Sclera clear, anicteric, Oropharynx clear, no lesions, Neck supple with midline trachea  NECK: normal, supple, no adenopathy, thyroid normal in size  RESPIRATORY: appears well, vitals normal, no respiratory distress, acyanotic, normal RR, chest clear, no wheezing, crepitations, rhonchi, normal symmetric air entry  HEART: regular rate and rhythm, S1, S2 normal, no murmur, click, rub or gallop.    ABDOMEN: abdomen is soft without tenderness, no masses, no hernias, no organomegaly, no rebound, no guarding. Suprapubic tenderness absent. No CVA tenderness.  NEUROLOGIC: normal without focal findings, CN II-XII are intact.  SKIN: no rashes, no wounds, no other lesions  PSYCH: Alert, oriented x 3, thought content appropriate, speech normal, pleasant and cooperative, good eye contact, well groomed,    Assessment/Plan:  Anjum Campbell is a 58 y.o. male who presents today for :    Anjum was seen today for cough.    Diagnoses and all orders for this visit:    Simple chronic bronchitis  -     amoxicillin-clavulanate 875-125mg (AUGMENTIN) 875-125 mg per tablet; Take 1 tablet by mouth 2 (two) times daily. for 10 days    Lumbar radiculopathy  -     HYDROcodone-acetaminophen (NORCO)  mg per tablet; Take 1 tablet by mouth every 8 (eight) hours as needed.  -     ibuprofen (ADVIL,MOTRIN) 600 MG tablet; Take 1 tablet (600 mg total) by mouth 3 (three) times daily.    Coronary artery " disease of native artery of native heart with stable angina pectoris    Tobacco abuse      1.  augmentin prescribed given smoking history and recent sick contact  2.  Refilled norco and ibuprofen.  Patient aware of risks taking ibuprofen while having CAD  3.  Encouraged patient to quit smoking  4.  Follow up with Dr. Trevizo as scheduled for CAD  5.  Call with any concerns    Enmanuel Doe MD

## 2018-11-09 ENCOUNTER — HOSPITAL ENCOUNTER (OUTPATIENT)
Dept: CARDIOLOGY | Facility: HOSPITAL | Age: 58
Discharge: HOME | End: 2018-11-09
Attending: INTERNAL MEDICINE
Payer: COMMERCIAL

## 2018-11-09 ENCOUNTER — OFFICE VISIT (OUTPATIENT)
Dept: CARDIOLOGY | Facility: CLINIC | Age: 58
End: 2018-11-09
Payer: COMMERCIAL

## 2018-11-09 VITALS
OXYGEN SATURATION: 96 % | RESPIRATION RATE: 20 BRPM | SYSTOLIC BLOOD PRESSURE: 126 MMHG | BODY MASS INDEX: 26.93 KG/M2 | WEIGHT: 171.94 LBS | HEART RATE: 88 BPM | DIASTOLIC BLOOD PRESSURE: 84 MMHG

## 2018-11-09 DIAGNOSIS — E78.5 DYSLIPIDEMIA: ICD-10-CM

## 2018-11-09 DIAGNOSIS — I25.10 CORONARY ARTERY DISEASE, ANGINA PRESENCE UNSPECIFIED, UNSPECIFIED VESSEL OR LESION TYPE, UNSPECIFIED WHETHER NATIVE OR TRANSPLANTED HEART: Primary | ICD-10-CM

## 2018-11-09 DIAGNOSIS — I10 ESSENTIAL HYPERTENSION: ICD-10-CM

## 2018-11-09 DIAGNOSIS — I25.10 CORONARY ARTERY DISEASE, ANGINA PRESENCE UNSPECIFIED, UNSPECIFIED VESSEL OR LESION TYPE, UNSPECIFIED WHETHER NATIVE OR TRANSPLANTED HEART: ICD-10-CM

## 2018-11-09 DIAGNOSIS — I50.22 CHRONIC SYSTOLIC CONGESTIVE HEART FAILURE: ICD-10-CM

## 2018-11-09 DIAGNOSIS — Z72.0 TOBACCO ABUSE: ICD-10-CM

## 2018-11-09 DIAGNOSIS — Z95.1 S/P CABG (CORONARY ARTERY BYPASS GRAFT): ICD-10-CM

## 2018-11-09 LAB
AV MEAN GRADIENT: 2.94 MMHG
AV PEAK GRADIENT: 6.35 MMHG
AV VALVE AREA: 2.25 CM2
CV ECHO LV RWT: 0.36 CM
DOP CALC AO PEAK VEL: 1.26 M/S
DOP CALC AO VTI: 24.03 CM
DOP CALC LVOT AREA: 4.52 CM2
DOP CALC LVOT DIAMETER: 2.4 CM
DOP CALC LVOT STROKE VOLUME: 54.12 CM3
DOP CALCLVOT PEAK VEL VTI: 11.97 CM
E WAVE DECELERATION TIME: 107.02 MSEC
E/A RATIO: 0.77
E/E' RATIO: 12.73
ECHO LV POSTERIOR WALL: 1.12 CM (ref 0.6–1.1)
FRACTIONAL SHORTENING: 20 % (ref 28–44)
INTERVENTRICULAR SEPTUM: 0.91 CM (ref 0.6–1.1)
IVRT: 0.12 MSEC
LA MAJOR: 4.69 CM
LA MINOR: 4.66 CM
LA WIDTH: 4.13 CM
LEFT ATRIUM SIZE: 3.51 CM
LEFT ATRIUM VOLUME: 57.6 CM3
LEFT INTERNAL DIMENSION IN SYSTOLE: 4.93 CM (ref 2.1–4)
LEFT VENTRICLE DIASTOLIC VOLUME: 191.57 ML
LEFT VENTRICLE SYSTOLIC VOLUME: 114.37 ML
LEFT VENTRICULAR INTERNAL DIMENSION IN DIASTOLE: 6.17 CM (ref 3.5–6)
LEFT VENTRICULAR MASS: 263.91 G
LV LATERAL E/E' RATIO: 10
LV SEPTAL E/E' RATIO: 17.5
MV PEAK A VEL: 0.91 M/S
MV PEAK E VEL: 0.7 M/S
PISA TR MAX VEL: 1.28 M/S
PULM VEIN S/D RATIO: 0.74
PV PEAK D VEL: 0.58 M/S
PV PEAK S VEL: 0.43 M/S
RA MAJOR: 3.9 CM
RA WIDTH: 3.76 CM
RIGHT VENTRICULAR END-DIASTOLIC DIMENSION: 3.13 CM
RV TISSUE DOPPLER FREE WALL SYSTOLIC VELOCITY 1 (APICAL 4 CHAMBER VIEW): 6.68 M/S
SINUS: 3.13 CM
STJ: 2.7 CM
TDI LATERAL: 0.07
TDI SEPTAL: 0.04
TDI: 0.06
TR MAX PG: 6.55 MMHG
TRICUSPID ANNULAR PLANE SYSTOLIC EXCURSION: 1.6 CM

## 2018-11-09 PROCEDURE — 93306 TTE W/DOPPLER COMPLETE: CPT | Mod: 26,,, | Performed by: INTERNAL MEDICINE

## 2018-11-09 PROCEDURE — 3079F DIAST BP 80-89 MM HG: CPT | Mod: CPTII,S$GLB,, | Performed by: INTERNAL MEDICINE

## 2018-11-09 PROCEDURE — 93306 TTE W/DOPPLER COMPLETE: CPT

## 2018-11-09 PROCEDURE — 93925 LOWER EXTREMITY STUDY: CPT | Mod: 50

## 2018-11-09 PROCEDURE — 99999 PR PBB SHADOW E&M-EST. PATIENT-LVL III: CPT | Mod: PBBFAC,,, | Performed by: INTERNAL MEDICINE

## 2018-11-09 PROCEDURE — 93925 LOWER EXTREMITY STUDY: CPT | Mod: 26,,, | Performed by: INTERNAL MEDICINE

## 2018-11-09 PROCEDURE — 93922 UPR/L XTREMITY ART 2 LEVELS: CPT | Mod: 50

## 2018-11-09 PROCEDURE — 99214 OFFICE O/P EST MOD 30 MIN: CPT | Mod: S$GLB,,, | Performed by: INTERNAL MEDICINE

## 2018-11-09 PROCEDURE — 3074F SYST BP LT 130 MM HG: CPT | Mod: CPTII,S$GLB,, | Performed by: INTERNAL MEDICINE

## 2018-11-09 PROCEDURE — 3008F BODY MASS INDEX DOCD: CPT | Mod: CPTII,S$GLB,, | Performed by: INTERNAL MEDICINE

## 2018-11-09 PROCEDURE — 93922 UPR/L XTREMITY ART 2 LEVELS: CPT | Mod: 26,,, | Performed by: INTERNAL MEDICINE

## 2018-11-09 NOTE — PROGRESS NOTES
Subjective:    Patient ID:  Anjum Campbell is a 58 y.o. male who presents for follow-up of No chief complaint on file.      HPI   Previous history:  Here follow-up coronary artery disease.  He underwent CABG by Dr. Payne.  He has some mild residual soreness at the sternotomy site but otherwise been doing well.  He is tolerating all his medicines.  He says still smoking upwards of a couple cigarettes per day.  We strongly advised him to discontinue this completely.  He denies any PND, orthopnea or lower extremity edema.  He has not experienced any dizziness, presyncope or syncope.    Today:  Here for follow-up of coronary artery disease.  He has been stable from a cardiac standpoint.  He denies any chest pain, shortness of breath or palpitations.  He has been staying away from tobacco.  He recently had some discomfort in the left toe but has not had any redness or discoloration.  He says almost little bit better today.  He denies any PND, orthopnea or lower extremity edema.  He has experiencing dizziness, presyncope or syncope.        Review of Systems   Constitution: Negative.   HENT: Negative.    Eyes: Negative.    Cardiovascular: Positive for chest pain. Negative for dyspnea on exertion, irregular heartbeat, leg swelling, near-syncope, orthopnea, palpitations, paroxysmal nocturnal dyspnea and syncope.   Respiratory: Negative for shortness of breath.    Skin: Negative.    Musculoskeletal: Negative.    Gastrointestinal: Negative for abdominal pain, constipation and diarrhea.   Genitourinary: Negative for dysuria.   Neurological: Negative for dizziness.   Psychiatric/Behavioral: Negative.         Objective:    Physical Exam   Constitutional: He is oriented to person, place, and time. He appears well-developed and well-nourished. No distress.   HENT:   Head: Normocephalic and atraumatic.   Eyes: Conjunctivae and EOM are normal. Pupils are equal, round, and reactive to light.   Neck: Normal range of motion.  Neck supple. No thyromegaly present.   Cardiovascular: Normal rate, regular rhythm and normal heart sounds.   No murmur heard.  Pulmonary/Chest: Effort normal and breath sounds normal. No respiratory distress. He has no wheezes. He has no rales. He exhibits no tenderness.   Abdominal: Soft. Bowel sounds are normal.   Musculoskeletal: He exhibits no edema.   Neurological: He is alert and oriented to person, place, and time.   Skin: Skin is warm and dry.   Psychiatric: He has a normal mood and affect. His behavior is normal.       echo:  6-18  CONCLUSIONS     1 - Moderately-severely depressed left ventricular systolic function (EF 30%).  Global hypokinesis with lateral WMA.     2 - Concentric hypertrophy.     3 - Impaired LV relaxation, normal LAP (grade 1 diastolic dysfunction).     NST:  Impression: ABNORMAL MYOCARDIAL PERFUSION  1. There is a moderate to large size fixed defect of moderate intensity that extends from the base to the apical inferoseptal wall of the left ventricle, consistent with myocardial injury. There is trivial maverick-injury ischemia.   2. There is abnormal wall motion at rest showing moderate to severe global hypokinesis of the left ventricle.   3. There is resting LV dysfunction with a reduced ejection fraction of 28 %.   4. The ventricular volumes are normal at rest and stress.   5. The extracardiac distribution of radioactivity is normal.     C:  B. Summary/Post-Operative Diagnosis      1.   Two vessel coronary artery disease involving mainly proximal LAD and distal small non- dominant left circumflex.   2.   Normal right and left Filling Pressures.   3.   Normal Pulmonary Hypertension.    D. Hemodynamic Results       AIR REST:  PW:  (16)  CO_THERM: 7.06  PA: 29  RV: 29  RA:  (5)  LVEDP: 9       E. Angiographic Results     Diagnostic:          Patient has a right dominant coronary artery.        - Left Main Coronary Artery:             The LM is normal. There is BRITTANY 3 flow.     - Left  Anterior Descending Artery:             The proximal LAD has a 80% stenosis. There is BRITTANY 3 flow.     - Left Circumflex Artery:             The distal LCX has a 90% stenosis. There is BRITTANY 3 flow. Small nondominant vessel distally of point of stenosis     - Right Coronary Artery:             The RCA has luminal irregularities. There is BRITTANY 3 flow.     - Common Femoral Artery:             The right CFA is normal.    Carotid ultrasound:  8-18  Impression:   RIGHT SIDE:   1 - 39 % right ICA stenosis.   Heterogeneous plaque in the right internal carotid artery.   Homogeneous plaque in the right common carotid artery.   Antegrade flow in the right vertebral artery.   LEFT SIDE:  1 - 39% left ICA stenosis.   Heterogeneous plaque in the left internal carotid artery.   Antegrade flow in the left vertebral artery.  Assessment:       1. Coronary artery disease, angina presence unspecified, unspecified vessel or lesion type, unspecified whether native or transplanted heart    2. S/P CABG (coronary artery bypass graft)    3. Chronic systolic congestive heart failure    4. Essential hypertension    5. Dyslipidemia    6. Tobacco abuse         Plan:       -Continue medical therapy  -status post LIMA to LAD (8/18)  -referred to cardiac rehab  -Counseled on tobacco cessation  -recheck echo in consider ICD if still depressed  -check lower extremity arterial ultrasound as well as VAL/TBI  -will base clearance for work off of testing    Return to clinic in 3 month

## 2018-11-12 ENCOUNTER — PATIENT MESSAGE (OUTPATIENT)
Dept: CARDIOLOGY | Facility: CLINIC | Age: 58
End: 2018-11-12

## 2018-11-12 LAB
LEFT ABI: 1.16
LEFT ANT TIBIAL SYS PSV: 59 CM/S
LEFT ARM BP: 141 MMHG
LEFT CFA PSV: 116 CM/S
LEFT DORSALIS PEDIS: 162 MMHG
LEFT EXTERNAL ILIAC PSV: 108 CM/S
LEFT PERONEAL SYS PSV: 42 CM/S
LEFT POPLITEAL PSV: 76 CM/S
LEFT POST TIBIAL SYS PSV: 48 CM/S
LEFT POSTERIOR TIBIAL: 169 MMHG
LEFT PROFUNDA SYS PSV: 51 CM/S
LEFT SUPER FEMORAL DIST SYS PSV: 53 CM/S
LEFT SUPER FEMORAL MID SYS PSV: 113 CM/S
LEFT SUPER FEMORAL OSTIAL SYS PSV: 91 CM/S
LEFT SUPER FEMORAL PROX SYS PSV: 84 CM/S
LEFT TBI: 0.83
LEFT TIB/PER TRUNK SYS PSV: 56 CM/S
LEFT TOE PRESSURE: 121 MMHG
OHS CV LEFT LOWER EXTREMITY ABI (NO CALC): 1.16
OHS CV RIGHT ABI LOWER EXTREMITY (NO CALC): 1.15
RIGHT ABI: 1.15
RIGHT ANT TIBIAL SYS PSV: 72 CM/S
RIGHT ARM BP: 146 MMHG
RIGHT CFA PSV: 89 CM/S
RIGHT DORSALIS PEDIS: 151 MMHG
RIGHT EXTERNAL ILLIAC PSV: 130 CM/S
RIGHT PERONEAL SYS PSV: 46 CM/S
RIGHT POPLITEAL PSV: 45 CM/S
RIGHT POST TIBIAL SYS PSV: 31 CM/S
RIGHT POSTERIOR TIBIAL: 168 MMHG
RIGHT PROFUNDA SYS PSV: 52 CM/S
RIGHT SUPER FEMORAL DIST SYS PSV: 66 CM/S
RIGHT SUPER FEMORAL MID SYS PSV: 134 CM/S
RIGHT SUPER FEMORAL OSTIAL SYS PSV: 101 CM/S
RIGHT SUPER FEMORAL PROX SYS PSV: 86 CM/S
RIGHT TBI: 0.76
RIGHT TIB/PER TRUNK SYS PSV: 67 CM/S
RIGHT TOE PRESSURE: 111 MMHG

## 2018-12-27 ENCOUNTER — OFFICE VISIT (OUTPATIENT)
Dept: FAMILY MEDICINE | Facility: CLINIC | Age: 58
End: 2018-12-27
Payer: COMMERCIAL

## 2018-12-27 VITALS
HEART RATE: 96 BPM | DIASTOLIC BLOOD PRESSURE: 88 MMHG | TEMPERATURE: 98 F | BODY MASS INDEX: 27.51 KG/M2 | WEIGHT: 175.25 LBS | OXYGEN SATURATION: 96 % | SYSTOLIC BLOOD PRESSURE: 138 MMHG | HEIGHT: 67 IN

## 2018-12-27 DIAGNOSIS — J06.9 UPPER RESPIRATORY TRACT INFECTION, UNSPECIFIED TYPE: Primary | ICD-10-CM

## 2018-12-27 PROCEDURE — 3075F SYST BP GE 130 - 139MM HG: CPT | Mod: CPTII,S$GLB,, | Performed by: FAMILY MEDICINE

## 2018-12-27 PROCEDURE — 99214 OFFICE O/P EST MOD 30 MIN: CPT | Mod: 25,S$GLB,, | Performed by: FAMILY MEDICINE

## 2018-12-27 PROCEDURE — 96372 THER/PROPH/DIAG INJ SC/IM: CPT | Mod: 59,S$GLB,, | Performed by: FAMILY MEDICINE

## 2018-12-27 PROCEDURE — 99999 PR PBB SHADOW E&M-EST. PATIENT-LVL III: CPT | Mod: PBBFAC,,, | Performed by: FAMILY MEDICINE

## 2018-12-27 PROCEDURE — 3079F DIAST BP 80-89 MM HG: CPT | Mod: CPTII,S$GLB,, | Performed by: FAMILY MEDICINE

## 2018-12-27 PROCEDURE — 3008F BODY MASS INDEX DOCD: CPT | Mod: CPTII,S$GLB,, | Performed by: FAMILY MEDICINE

## 2018-12-27 RX ORDER — METHYLPREDNISOLONE ACETATE 40 MG/ML
40 INJECTION, SUSPENSION INTRA-ARTICULAR; INTRALESIONAL; INTRAMUSCULAR; SOFT TISSUE
Status: COMPLETED | OUTPATIENT
Start: 2018-12-27 | End: 2018-12-27

## 2018-12-27 RX ORDER — ALBUTEROL SULFATE 90 UG/1
2 AEROSOL, METERED RESPIRATORY (INHALATION) EVERY 6 HOURS PRN
Qty: 18 G | Refills: 3 | Status: SHIPPED | OUTPATIENT
Start: 2018-12-27 | End: 2019-12-27

## 2018-12-27 RX ORDER — FLUTICASONE PROPIONATE 50 MCG
1 SPRAY, SUSPENSION (ML) NASAL DAILY
Qty: 1 BOTTLE | Refills: 0 | Status: SHIPPED | OUTPATIENT
Start: 2018-12-27 | End: 2022-05-25

## 2018-12-27 RX ADMIN — METHYLPREDNISOLONE ACETATE 40 MG: 40 INJECTION, SUSPENSION INTRA-ARTICULAR; INTRALESIONAL; INTRAMUSCULAR; SOFT TISSUE at 11:12

## 2018-12-27 NOTE — PROGRESS NOTES
Subjective:       Patient ID: Anjum Campbell is a 58 y.o. male.    Chief Complaint: Nasal Congestion; Shortness of Breath; and Cough    HPI   59 yo male presents for uri symptoms x 1 day. Endorses congestion sob, cough, rhinorrhea, and HA. Denies any f/c. States he smokes about 3 cigs/day. Pt has tried mucinex which he states has not helped much.    Review of Systems   Constitutional: Negative.    HENT: Positive for congestion, postnasal drip, rhinorrhea and sore throat.    Respiratory: Positive for cough. Negative for shortness of breath and wheezing.    Cardiovascular: Negative.    Gastrointestinal: Negative.    Genitourinary: Negative.    Musculoskeletal: Negative.    Neurological: Negative.    Psychiatric/Behavioral: Negative.         Past Medical History:   Diagnosis Date    Back injury 1994    Chest pain     CHF (congestive heart failure)     Hypertension     Thymoma 2018     Past Surgical History:   Procedure Laterality Date    back fusion   1995    lumbar spine    CABGx1 N/A 8/13/2018    Performed by [facility]ville [facility] at [facility] OR 2ND FLR    EXCISION, MASS, MEDIASTINUM  8/13/2018    Performed by Orville [facility] at [facility] OR 2ND FLR    Heart Cath-Bilateral Bilateral 6/28/2018    Performed [facility] at [facility] CATH LAB    INJECTION-STEROID-EPIDURAL-CAUDAL N/A 8/4/2016    [facility] Gh[facility]am Reed Jr[facility] at [facility] OR     Family History   Problem Relation Age of Onset    Lung cancer Father      Social History     Socioeconomic History    Marital status:      Spouse name: None    Number of children: None    Years of education: None    Highest education level: None   Social Needs    Financial resource strain: None    Food insecurity - worry: None    Food insecurity - inability: None    Transportation needs - medical: None    Transportation needs - non-medical: None   Occupational History    None   Tobacco Use    Smoking status: Current Every Day Smoker      Packs/day: 0.25     Years: 30.00     Pack years: 7.50     Types: Cigarettes    Smokeless tobacco: Never Used    Tobacco comment: 9/27/18 ---only a few cigs per day, trying to quit   Substance and Sexual Activity    Alcohol use: Yes     Comment: occaSIONAL    Drug use: No    Sexual activity: Yes   Other Topics Concern    None   Social History Narrative    None        Objective:      Vitals:    12/27/18 1100   BP: 138/88   Pulse: 96   Temp: 98.1 °F (36.7 °C)     Physical Exam   Constitutional: He is oriented to person, place, and time. No distress.   HENT:   Head: Normocephalic and atraumatic.   Mouth/Throat: No oropharyngeal exudate.   Eyes: Conjunctivae are normal.   Neck: Neck supple.   Cardiovascular: Normal rate, regular rhythm and normal heart sounds. Exam reveals no gallop and no friction rub.   No murmur heard.  Pulmonary/Chest: Effort normal and breath sounds normal. He has no wheezes. He has no rales.   Abdominal: Soft. Bowel sounds are normal. There is no tenderness.   Musculoskeletal: He exhibits no edema.   Lymphadenopathy:     He has no cervical adenopathy.   Neurological: He is alert and oriented to person, place, and time.   Skin: Skin is warm and dry.   Psychiatric: He has a normal mood and affect. His behavior is normal. Judgment and thought content normal.        Assessment:       1. Upper respiratory tract infection, unspecified type        Plan:       Upper respiratory tract infection, unspecified type  - Most likely viral. Advised pt to call clinic for abx if symptoms last 7-10 days. Advised pt to continue using otc meds.  -     methylPREDNISolone acetate injection 40 mg  -     fluticasone (FLONASE) 50 mcg/actuation nasal spray; 1 spray (50 mcg total) by Each Nare route once daily.  Dispense: 1 Bottle; Refill: 0  -     albuterol (VENTOLIN HFA) 90 mcg/actuation inhaler; Inhale 2 puffs into the lungs every 6 (six) hours as needed for Wheezing or Shortness of Breath. Rescue  Dispense: 18 g;  Refill: 3      Follow-up if symptoms worsen or fail to improve.            Bora Heredia MD  12/27/2018 11:58 AM

## 2019-03-08 ENCOUNTER — PATIENT MESSAGE (OUTPATIENT)
Dept: HEMATOLOGY/ONCOLOGY | Facility: CLINIC | Age: 59
End: 2019-03-08

## 2019-03-25 ENCOUNTER — OFFICE VISIT (OUTPATIENT)
Dept: FAMILY MEDICINE | Facility: CLINIC | Age: 59
End: 2019-03-25

## 2019-03-25 VITALS
TEMPERATURE: 99 F | SYSTOLIC BLOOD PRESSURE: 136 MMHG | DIASTOLIC BLOOD PRESSURE: 75 MMHG | RESPIRATION RATE: 16 BRPM | WEIGHT: 167.31 LBS | OXYGEN SATURATION: 98 % | HEART RATE: 83 BPM | BODY MASS INDEX: 25.36 KG/M2 | HEIGHT: 68 IN

## 2019-03-25 DIAGNOSIS — I25.10 CORONARY ARTERY DISEASE, ANGINA PRESENCE UNSPECIFIED, UNSPECIFIED VESSEL OR LESION TYPE, UNSPECIFIED WHETHER NATIVE OR TRANSPLANTED HEART: Primary | ICD-10-CM

## 2019-03-25 DIAGNOSIS — I10 BENIGN ESSENTIAL HTN: ICD-10-CM

## 2019-03-25 DIAGNOSIS — E78.00 PURE HYPERCHOLESTEROLEMIA: ICD-10-CM

## 2019-03-25 PROBLEM — R07.9 CHEST PAIN: Status: RESOLVED | Noted: 2018-06-28 | Resolved: 2019-03-25

## 2019-03-25 PROBLEM — R07.89 ATYPICAL CHEST PAIN: Status: RESOLVED | Noted: 2018-06-18 | Resolved: 2019-03-25

## 2019-03-25 PROCEDURE — 99999 PR PBB SHADOW E&M-EST. PATIENT-LVL III: CPT | Mod: PBBFAC,,, | Performed by: FAMILY MEDICINE

## 2019-03-25 PROCEDURE — 99999 PR PBB SHADOW E&M-EST. PATIENT-LVL III: ICD-10-PCS | Mod: PBBFAC,,, | Performed by: FAMILY MEDICINE

## 2019-03-25 PROCEDURE — 99213 OFFICE O/P EST LOW 20 MIN: CPT | Mod: PBBFAC,PN | Performed by: FAMILY MEDICINE

## 2019-03-25 PROCEDURE — 99214 PR OFFICE/OUTPT VISIT, EST, LEVL IV, 30-39 MIN: ICD-10-PCS | Mod: S$PBB,,, | Performed by: FAMILY MEDICINE

## 2019-03-25 PROCEDURE — 99214 OFFICE O/P EST MOD 30 MIN: CPT | Mod: S$PBB,,, | Performed by: FAMILY MEDICINE

## 2019-03-25 RX ORDER — CARVEDILOL 12.5 MG/1
25 TABLET ORAL 2 TIMES DAILY WITH MEALS
Qty: 120 TABLET | Refills: 11 | Status: SHIPPED | OUTPATIENT
Start: 2019-03-25 | End: 2019-06-06 | Stop reason: SDUPTHER

## 2019-03-25 RX ORDER — ASPIRIN 325 MG
325 TABLET, DELAYED RELEASE (ENTERIC COATED) ORAL DAILY
COMMUNITY

## 2019-03-25 RX ORDER — ATORVASTATIN CALCIUM 40 MG/1
40 TABLET, FILM COATED ORAL DAILY
Qty: 90 TABLET | Refills: 3 | Status: SHIPPED | OUTPATIENT
Start: 2019-03-25 | End: 2019-06-06 | Stop reason: SDUPTHER

## 2019-03-25 NOTE — LETTER
March 25, 2019      [facility]  [address]  [place] [address]  Phone: [phone]       Patient: Anjum Campbell   YOB: 1960  Date of Visit: 03/25/2019    To Whom It May Concern:    Alberto Campbell  was at [facility] on 03/25/2019. He may return to work/school on 3/26/19 with no restrictions.  The reason for his positive amphetamine on drug screen was from the use of Sudafed and he was taking this for a decongestant. If you have any questions or concerns, or if I can be of further assistance, please do not hesitate to contact me.    Sincerely,          Enmanuel Doe MD

## 2019-05-30 ENCOUNTER — PATIENT MESSAGE (OUTPATIENT)
Dept: CARDIOLOGY | Facility: CLINIC | Age: 59
End: 2019-05-30

## 2019-06-03 ENCOUNTER — HOSPITAL ENCOUNTER (OUTPATIENT)
Dept: CARDIOLOGY | Facility: HOSPITAL | Age: 59
Discharge: HOME | End: 2019-06-03
Attending: INTERNAL MEDICINE
Payer: MEDICAID

## 2019-06-03 DIAGNOSIS — I25.10 CORONARY ARTERY DISEASE INVOLVING NATIVE CORONARY ARTERY OF NATIVE HEART WITHOUT ANGINA PECTORIS: Primary | ICD-10-CM

## 2019-06-03 DIAGNOSIS — I25.10 CORONARY ARTERY DISEASE INVOLVING NATIVE CORONARY ARTERY OF NATIVE HEART WITHOUT ANGINA PECTORIS: ICD-10-CM

## 2019-06-03 LAB
AORTIC ROOT ANNULUS: 2.97 CM
AORTIC VALVE CUSP SEPERATION: 1.48 CM
AV INDEX (PROSTH): 0.52
AV MEAN GRADIENT: 2.8 MMHG
AV PEAK GRADIENT: 5.66 MMHG
AV VALVE AREA: 1.84 CM2
AV VELOCITY RATIO: 0.63
CV ECHO LV RWT: 0.45 CM
CV STRESS BASE HR: 92 BPM
DIASTOLIC BLOOD PRESSURE: 85 MMHG
DOP CALC AO PEAK VEL: 1.19 M/S
DOP CALC AO VTI: 22.22 CM
DOP CALC LVOT AREA: 3.53 CM2
DOP CALC LVOT DIAMETER: 2.12 CM
DOP CALC LVOT PEAK VEL: 0.75 M/S
DOP CALC LVOT STROKE VOLUME: 40.82 CM3
DOP CALCLVOT PEAK VEL VTI: 11.57 CM
E WAVE DECELERATION TIME: 139.34 MSEC
E/A RATIO: 0.68
ECHO LV POSTERIOR WALL: 1 CM (ref 0.6–1.1)
FRACTIONAL SHORTENING: 12 % (ref 28–44)
INTERVENTRICULAR SEPTUM: 0.82 CM (ref 0.6–1.1)
IVRT: 0.09 MSEC
LA MAJOR: 4.8 CM
LA MINOR: 4.75 CM
LA WIDTH: 3.88 CM
LEFT ATRIUM SIZE: 2.84 CM
LEFT ATRIUM VOLUME: 44.72 CM3
LEFT INTERNAL DIMENSION IN SYSTOLE: 3.9 CM (ref 2.1–4)
LEFT VENTRICLE DIASTOLIC VOLUME: 88.75 ML
LEFT VENTRICLE SYSTOLIC VOLUME: 65.92 ML
LEFT VENTRICULAR INTERNAL DIMENSION IN DIASTOLE: 4.42 CM (ref 3.5–6)
LEFT VENTRICULAR MASS: 130.91 G
MV PEAK A VEL: 0.62 M/S
MV PEAK E VEL: 0.42 M/S
OHS CV CPX 1 MINUTE RECOVERY HEART RATE: 108 BPM
OHS CV CPX 85 PERCENT MAX PREDICTED HEART RATE MALE: 139
OHS CV CPX ESTIMATED METS: 10
OHS CV CPX MAX PREDICTED HEART RATE: 163
OHS CV CPX PATIENT IS FEMALE: 0
OHS CV CPX PATIENT IS MALE: 1
OHS CV CPX PEAK DIASTOLIC BLOOD PRESSURE: 139 MMHG
OHS CV CPX PEAK HEAR RATE: 138 BPM
OHS CV CPX PEAK RATE PRESSURE PRODUCT: NORMAL
OHS CV CPX PEAK SYSTOLIC BLOOD PRESSURE: 85 MMHG
OHS CV CPX PERCENT MAX PREDICTED HEART RATE ACHIEVED: 85
OHS CV CPX RATE PRESSURE PRODUCT PRESENTING: NORMAL
PISA TR MAX VEL: 1.61 M/S
PV PEAK VELOCITY: 0.85 CM/S
RA MAJOR: 4.44 CM
RA PRESSURE: 3 MMHG
RA WIDTH: 3.53 CM
RIGHT VENTRICULAR END-DIASTOLIC DIMENSION: 2.8 CM
SINUS: 3.06 CM
STRESS ECHO POST EXERCISE DUR MIN: 8 MINUTES
STRESS ECHO POST EXERCISE DUR SEC: 3 SECONDS
STRESS ECHO TARGET HR: 138.55 BPM
SYSTOLIC BLOOD PRESSURE: 148 MMHG
TR MAX PG: 10.37 MMHG
TRICUSPID ANNULAR PLANE SYSTOLIC EXCURSION: 1.28 CM
TV REST PULMONARY ARTERY PRESSURE: 13 MMHG

## 2019-06-03 PROCEDURE — 93018 CV STRESS TEST I&R ONLY: CPT | Mod: ,,, | Performed by: INTERNAL MEDICINE

## 2019-06-03 PROCEDURE — 93227 HOLTER MONITOR - 24 HOUR (CUPID ONLY): ICD-10-PCS | Mod: ,,, | Performed by: INTERNAL MEDICINE

## 2019-06-03 PROCEDURE — 93016 CV STRESS TEST SUPVJ ONLY: CPT | Mod: ,,, | Performed by: INTERNAL MEDICINE

## 2019-06-03 PROCEDURE — 93306 TTE W/DOPPLER COMPLETE: CPT

## 2019-06-03 PROCEDURE — 93016 TREADMILL STRESS TEST (CUPID ONLY): ICD-10-PCS | Mod: ,,, | Performed by: INTERNAL MEDICINE

## 2019-06-03 PROCEDURE — 93225 XTRNL ECG REC<48 HRS REC: CPT

## 2019-06-03 PROCEDURE — 93306 TRANSTHORACIC ECHO (TTE) COMPLETE (CUPID ONLY): ICD-10-PCS | Mod: 26,,, | Performed by: INTERNAL MEDICINE

## 2019-06-03 PROCEDURE — 93306 TTE W/DOPPLER COMPLETE: CPT | Mod: 26,,, | Performed by: INTERNAL MEDICINE

## 2019-06-03 PROCEDURE — 93227 XTRNL ECG REC<48 HR R&I: CPT | Mod: ,,, | Performed by: INTERNAL MEDICINE

## 2019-06-03 PROCEDURE — 93018 TREADMILL STRESS TEST (CUPID ONLY): ICD-10-PCS | Mod: ,,, | Performed by: INTERNAL MEDICINE

## 2019-06-03 PROCEDURE — 93017 CV STRESS TEST TRACING ONLY: CPT

## 2019-06-06 ENCOUNTER — OFFICE VISIT (OUTPATIENT)
Dept: CARDIOLOGY | Facility: CLINIC | Age: 59
End: 2019-06-06
Payer: MEDICAID

## 2019-06-06 VITALS
SYSTOLIC BLOOD PRESSURE: 158 MMHG | DIASTOLIC BLOOD PRESSURE: 90 MMHG | RESPIRATION RATE: 16 BRPM | WEIGHT: 167.56 LBS | HEART RATE: 98 BPM | BODY MASS INDEX: 25.48 KG/M2 | OXYGEN SATURATION: 95 %

## 2019-06-06 DIAGNOSIS — E78.00 PURE HYPERCHOLESTEROLEMIA: ICD-10-CM

## 2019-06-06 DIAGNOSIS — Z95.1 S/P CABG (CORONARY ARTERY BYPASS GRAFT): ICD-10-CM

## 2019-06-06 DIAGNOSIS — I10 BENIGN ESSENTIAL HTN: ICD-10-CM

## 2019-06-06 DIAGNOSIS — E78.5 DYSLIPIDEMIA: ICD-10-CM

## 2019-06-06 DIAGNOSIS — I10 ESSENTIAL HYPERTENSION: ICD-10-CM

## 2019-06-06 DIAGNOSIS — I50.22 CHRONIC SYSTOLIC CONGESTIVE HEART FAILURE: ICD-10-CM

## 2019-06-06 DIAGNOSIS — I25.10 CORONARY ARTERY DISEASE, ANGINA PRESENCE UNSPECIFIED, UNSPECIFIED VESSEL OR LESION TYPE, UNSPECIFIED WHETHER NATIVE OR TRANSPLANTED HEART: ICD-10-CM

## 2019-06-06 DIAGNOSIS — Z72.0 TOBACCO ABUSE: ICD-10-CM

## 2019-06-06 DIAGNOSIS — I25.10 CORONARY ARTERY DISEASE INVOLVING NATIVE CORONARY ARTERY OF NATIVE HEART WITHOUT ANGINA PECTORIS: Primary | ICD-10-CM

## 2019-06-06 PROCEDURE — 99213 OFFICE O/P EST LOW 20 MIN: CPT | Mod: PBBFAC,PO | Performed by: INTERNAL MEDICINE

## 2019-06-06 PROCEDURE — 99999 PR PBB SHADOW E&M-EST. PATIENT-LVL III: ICD-10-PCS | Mod: PBBFAC,,, | Performed by: INTERNAL MEDICINE

## 2019-06-06 PROCEDURE — 99214 PR OFFICE/OUTPT VISIT, EST, LEVL IV, 30-39 MIN: ICD-10-PCS | Mod: S$PBB,,, | Performed by: INTERNAL MEDICINE

## 2019-06-06 PROCEDURE — 99214 OFFICE O/P EST MOD 30 MIN: CPT | Mod: S$PBB,,, | Performed by: INTERNAL MEDICINE

## 2019-06-06 PROCEDURE — 99999 PR PBB SHADOW E&M-EST. PATIENT-LVL III: CPT | Mod: PBBFAC,,, | Performed by: INTERNAL MEDICINE

## 2019-06-06 RX ORDER — ATORVASTATIN CALCIUM 40 MG/1
40 TABLET, FILM COATED ORAL DAILY
Qty: 90 TABLET | Refills: 3 | Status: SHIPPED | OUTPATIENT
Start: 2019-06-06 | End: 2020-06-05

## 2019-06-06 RX ORDER — CARVEDILOL 12.5 MG/1
25 TABLET ORAL 2 TIMES DAILY WITH MEALS
Qty: 360 TABLET | Refills: 3 | Status: SHIPPED | OUTPATIENT
Start: 2019-06-06 | End: 2023-10-27

## 2019-06-06 RX ORDER — CARVEDILOL 12.5 MG/1
25 TABLET ORAL 2 TIMES DAILY WITH MEALS
Qty: 360 TABLET | Refills: 3 | Status: CANCELLED | OUTPATIENT
Start: 2019-06-06 | End: 2020-06-05

## 2019-06-06 RX ORDER — ATORVASTATIN CALCIUM 40 MG/1
40 TABLET, FILM COATED ORAL DAILY
Qty: 90 TABLET | Refills: 3 | Status: CANCELLED | OUTPATIENT
Start: 2019-06-06 | End: 2020-06-05

## 2019-06-06 NOTE — PROGRESS NOTES
Subjective:    Patient ID:  Anjum Campbell is a 59 y.o. male who presents for follow-up of Results      HPI     Previous history:  Here for follow-up of coronary artery disease.  He has been stable from a cardiac standpoint.  He denies any chest pain, shortness of breath or palpitations.  He has been staying away from tobacco.  He recently had some discomfort in the left toe but has not had any redness or discoloration.  He says almost little bit better today.  He denies any PND, orthopnea or lower extremity edema.  He has experiencing dizziness, presyncope or syncope.    Today:  Here follow-up coronary artery disease.  He lost his insurance in the interim since we have seen him.  He underwent testing to trying get his [employer] licensing back.  His LV function unfortunately is not improved.  He did achieve 10 Mets on treadmill.  He still smoking a couple cigarettes a day which we again strongly advised him not to do.  He said he misses couple doses of medicines at night as well.  He denies any cardiopulmonary complaints otherwise has been in his usual state health.  He still has some pains around the sternotomy site but stable.          Review of Systems   Constitution: Negative.   HENT: Negative.    Eyes: Negative.    Cardiovascular: Positive for chest pain. Negative for dyspnea on exertion, irregular heartbeat, leg swelling, near-syncope, orthopnea, palpitations, paroxysmal nocturnal dyspnea and syncope.   Respiratory: Negative for shortness of breath.    Skin: Negative.    Musculoskeletal: Negative.    Gastrointestinal: Negative for abdominal pain, constipation and diarrhea.   Genitourinary: Negative for dysuria.   Neurological: Negative for dizziness.   Psychiatric/Behavioral: Negative.         Objective:    Physical Exam   Constitutional: He is oriented to person, place, and time. He appears well-developed and well-nourished. No distress.   HENT:   Head: Normocephalic and atraumatic.   Eyes: Pupils are  equal, round, and reactive to light. Conjunctivae and EOM are normal.   Neck: Normal range of motion. Neck supple. No thyromegaly present.   Cardiovascular: Normal rate, regular rhythm and normal heart sounds.   No murmur heard.  Pulmonary/Chest: Effort normal and breath sounds normal. No respiratory distress. He has no wheezes. He has no rales. He exhibits no tenderness.   Abdominal: Soft. Bowel sounds are normal.   Musculoskeletal: He exhibits no edema.   Neurological: He is alert and oriented to person, place, and time.   Skin: Skin is warm and dry.   Psychiatric: He has a normal mood and affect. His behavior is normal.       echo:  6-18  CONCLUSIONS     1 - Moderately-severely depressed left ventricular systolic function (EF 30%).  Global hypokinesis with lateral WMA.     2 - Concentric hypertrophy.     3 - Impaired LV relaxation, normal LAP (grade 1 diastolic dysfunction).     NST:  Impression: ABNORMAL MYOCARDIAL PERFUSION  1. There is a moderate to large size fixed defect of moderate intensity that extends from the base to the apical inferoseptal wall of the left ventricle, consistent with myocardial injury. There is trivial maverick-injury ischemia.   2. There is abnormal wall motion at rest showing moderate to severe global hypokinesis of the left ventricle.   3. There is resting LV dysfunction with a reduced ejection fraction of 28 %.   4. The ventricular volumes are normal at rest and stress.   5. The extracardiac distribution of radioactivity is normal.     [facility]:  B. Summary/Post-Operative Diagnosis      1.   Two vessel coronary artery disease involving mainly proximal LAD and distal small non- dominant left circumflex.   2.   Normal right and left Filling Pressures.   3.   Normal Pulmonary Hypertension.    D. Hemodynamic Results       AIR REST:  PW:  (16)  CO_THERM: 7.06  PA: 29  RV: 29  RA:  (5)  LVEDP: 9       E. Angiographic Results     Diagnostic:          Patient has a right dominant coronary artery.         - Left Main Coronary Artery:             The LM is normal. There is BRITTANY 3 flow.     - Left Anterior Descending Artery:             The proximal LAD has a 80% stenosis. There is BRITTANY 3 flow.     - Left Circumflex Artery:             The distal LCX has a 90% stenosis. There is BRITTANY 3 flow. Small nondominant vessel distally of point of stenosis     - Right Coronary Artery:             The RCA has luminal irregularities. There is BRITTANY 3 flow.     - Common Femoral Artery:             The right CFA is normal.    Carotid ultrasound:  8-18  Impression:   RIGHT SIDE:   1 - 39 % right ICA stenosis.   Heterogeneous plaque in the right internal carotid artery.   Homogeneous plaque in the right common carotid artery.   Antegrade flow in the right vertebral artery.   LEFT SIDE:  1 - 39% left ICA stenosis.   Heterogeneous plaque in the left internal carotid artery.   Antegrade flow in the left vertebral artery.    ECHO:  5-19  · Moderately decreased left ventricular systolic function. The estimated ejection fraction is 35%  · Grade I (mild) left ventricular diastolic dysfunction consistent with impaired relaxation.  · Normal left atrial pressure.  · Mild-moderate left atrial enlargement.  · Mild right atrial enlargement.  · Normal central venous pressure (3 mm Hg).  · The estimated PA systolic pressure is 13 mm Hg    Stress test:    Patient exercised for 8 min and 3 sec achieving a workload of 10.10 Mets and a maximum heart rate of 141 beats per minute which represents 86% of the maximum age predicted heart rate.  The resting blood pressure was 148/85 mm of mercury laina to a maximum blood pressure of 253/97 mm of mercury    The EKG portion of this study is positive for ischemia.  However the specificity of this test to detect ischemia is reduced because of baseline ST abnormalities    Holter pending    Assessment:       1. Coronary artery disease involving native coronary artery of native heart without angina pectoris     2. Chronic systolic congestive heart failure    3. S/P CABG (coronary artery bypass graft)    4. Essential hypertension    5. Dyslipidemia    6. Tobacco abuse         Plan:       -Continue medical therapy  -status post LIMA to LAD (8/18)  -candidate for ICD placement but currently unable to get due to insurance purposes  -referred to cardiac rehab  -Counseled on tobacco cessation  -referred to   -unable to clear for [employer] licensing currently based off testing    Return to clinic after disability/insurance issues arranged

## 2019-06-09 LAB
AORTIC ROOT ANNULUS: 2.97 CM
AORTIC VALVE CUSP SEPERATION: 1.48 CM
AV INDEX (PROSTH): 0.52
AV MEAN GRADIENT: 2.8 MMHG
AV PEAK GRADIENT: 5.66 MMHG
AV VALVE AREA: 1.84 CM2
AV VELOCITY RATIO: 0.63
CV ECHO LV RWT: 0.45 CM
DOP CALC AO PEAK VEL: 1.19 M/S
DOP CALC AO VTI: 22.22 CM
DOP CALC LVOT AREA: 3.53 CM2
DOP CALC LVOT DIAMETER: 2.12 CM
DOP CALC LVOT PEAK VEL: 0.75 M/S
DOP CALC LVOT STROKE VOLUME: 40.82 CM3
DOP CALCLVOT PEAK VEL VTI: 11.57 CM
E WAVE DECELERATION TIME: 139.34 MSEC
E/A RATIO: 0.68
ECHO LV POSTERIOR WALL: 1 CM (ref 0.6–1.1)
FRACTIONAL SHORTENING: 12 % (ref 28–44)
INTERVENTRICULAR SEPTUM: 0.82 CM (ref 0.6–1.1)
IVRT: 0.09 MSEC
LA MAJOR: 4.8 CM
LA MINOR: 4.75 CM
LA WIDTH: 3.88 CM
LEFT ATRIUM SIZE: 2.84 CM
LEFT ATRIUM VOLUME: 44.72 CM3
LEFT INTERNAL DIMENSION IN SYSTOLE: 3.9 CM (ref 2.1–4)
LEFT VENTRICLE DIASTOLIC VOLUME: 88.75 ML
LEFT VENTRICLE SYSTOLIC VOLUME: 65.92 ML
LEFT VENTRICULAR INTERNAL DIMENSION IN DIASTOLE: 4.42 CM (ref 3.5–6)
LEFT VENTRICULAR MASS: 130.91 G
MV PEAK A VEL: 0.62 M/S
MV PEAK E VEL: 0.42 M/S
OHS CV EVENT MONITOR DAY: 1
OHS CV HOLTER LENGTH DECIMAL HOURS: 47.98
OHS CV HOLTER LENGTH HOURS: 23
OHS CV HOLTER LENGTH MINUTES: 59
PISA TR MAX VEL: 1.61 M/S
PV PEAK VELOCITY: 0.85 CM/S
RA MAJOR: 4.44 CM
RA WIDTH: 3.53 CM
RIGHT VENTRICULAR END-DIASTOLIC DIMENSION: 2.8 CM
SINUS: 3.06 CM
TR MAX PG: 10.37 MMHG
TRICUSPID ANNULAR PLANE SYSTOLIC EXCURSION: 1.28 CM

## 2019-06-11 DIAGNOSIS — I25.10 CORONARY ARTERY DISEASE INVOLVING NATIVE CORONARY ARTERY OF NATIVE HEART WITHOUT ANGINA PECTORIS: Primary | ICD-10-CM

## 2019-07-05 ENCOUNTER — PATIENT MESSAGE (OUTPATIENT)
Dept: CARDIOLOGY | Facility: CLINIC | Age: 59
End: 2019-07-05

## 2019-09-25 ENCOUNTER — PATIENT OUTREACH (OUTPATIENT)
Dept: ADMINISTRATIVE | Facility: OTHER | Age: 59
End: 2019-09-25

## 2019-09-25 DIAGNOSIS — Z12.11 SCREEN FOR COLON CANCER: Primary | ICD-10-CM

## 2020-01-06 DIAGNOSIS — I10 BENIGN ESSENTIAL HTN: ICD-10-CM

## 2020-01-28 ENCOUNTER — HOSPITAL ENCOUNTER (EMERGENCY)
Facility: HOSPITAL | Age: 60
Discharge: HOME | End: 2020-01-28
Attending: EMERGENCY MEDICINE
Payer: MEDICAID

## 2020-01-28 VITALS
RESPIRATION RATE: 18 BRPM | OXYGEN SATURATION: 99 % | BODY MASS INDEX: 26.22 KG/M2 | HEART RATE: 92 BPM | SYSTOLIC BLOOD PRESSURE: 156 MMHG | DIASTOLIC BLOOD PRESSURE: 80 MMHG | HEIGHT: 68 IN | WEIGHT: 173 LBS | TEMPERATURE: 98 F

## 2020-01-28 DIAGNOSIS — R52 PAIN: ICD-10-CM

## 2020-01-28 DIAGNOSIS — M25.511 ACUTE PAIN OF RIGHT SHOULDER: Primary | ICD-10-CM

## 2020-01-28 PROCEDURE — 99283 EMERGENCY DEPT VISIT LOW MDM: CPT | Mod: 25

## 2020-01-28 RX ORDER — HYDROCODONE BITARTRATE AND ACETAMINOPHEN 5; 325 MG/1; MG/1
1 TABLET ORAL EVERY 6 HOURS PRN
Qty: 10 TABLET | Refills: 0 | Status: SHIPPED | OUTPATIENT
Start: 2020-01-28 | End: 2022-05-25

## 2020-01-28 RX ORDER — METHOCARBAMOL 500 MG/1
1000 TABLET, FILM COATED ORAL 3 TIMES DAILY PRN
Qty: 30 TABLET | Refills: 0 | Status: SHIPPED | OUTPATIENT
Start: 2020-01-28 | End: 2020-02-02

## 2020-01-28 NOTE — ED PROVIDER NOTES
Encounter Date: 1/28/2020       History     Chief Complaint   Patient presents with    Shoulder Injury     right     59-year-old male with past medical history of CHF, hypertension, chronic back pain presents emergency department with right shoulder pain.  The patient states that he slipped in his bathroom this morning landing on his right upper back.  The patient states that he did not initially have shoulder pain but since this morning he has developed progressively worsening right shoulder pain.  Patient states that the pain is located in the anterior aspect of his right shoulder and is moderate to severe in nature with certain movements.  No relief with over-the-counter NSAIDs.  He denies any other injuries such as head trauma or loss of consciousness.        Review of patient's allergies indicates:  No Known Allergies  Past Medical History:   Diagnosis Date    Back injury 1994    Chest pain     CHF (congestive heart failure)     Hypertension     Thymoma 2018     Past Surgical History:   Procedure Laterality Date    back fusion   1995    lumbar spine    CATHETERIZATION OF BOTH LEFT AND RIGHT HEART Bilateral 6/28/2018    Procedure: Heart Cath-Bilateral;  Surgeon: Gerardo Trevizo MD;  [facility] CATH LAB;  Service: Cardiology;  Laterality: Bilateral;  RN PREOP 6/27/2018    CORONARY ARTERY BYPASS GRAFT (CABG) N/A 8/13/2018    Procedure: CABGx1;  Surgeon: Orville Payne MD;  Location: [facility] OR [address] [place] [address];  Service: Cardiovascular;  Laterality: N/A;    MEDIASTINAL MASS EXCISION  8/13/2018    Procedure: EXCISION, MASS, MEDIASTINUM;  Surgeon: Orville Payne MD;  Location: [facility] OR [address] [place] [address];  Service: Cardiovascular;;     Family History   Problem Relation Age of Onset    Lung cancer Father      Social History     Tobacco Use    Smoking status: Current Every Day Smoker     Packs/day: 0.25     Years: 30.00     Pack years: 7.50     Types: Cigarettes    Smokeless tobacco: Never Used    Tobacco  comment: 9/27/18 ---only a few cigs per day, trying to quit   Substance Use Topics    Alcohol use: Yes     Comment: occaSIONAL    Drug use: No     Review of Systems   Constitutional: Negative for chills, diaphoresis, fatigue and fever.   HENT: Negative for facial swelling and hearing loss.    Eyes: Negative for visual disturbance.   Respiratory: Negative for cough, shortness of breath, wheezing and stridor.    Cardiovascular: Negative for chest pain.   Gastrointestinal: Negative for abdominal pain, diarrhea, nausea and vomiting.   Genitourinary: Negative for decreased urine volume, dysuria, flank pain and hematuria.   Musculoskeletal: Positive for arthralgias. Negative for back pain.   Skin: Negative for pallor.   Neurological: Negative for weakness, light-headedness, numbness and headaches.   Psychiatric/Behavioral: Negative for confusion.   All other systems reviewed and are negative.      Physical Exam     Initial Vitals [01/28/20 1451]   BP Pulse Resp Temp SpO2   (!) 156/80 92 18 98 °F (36.7 °C) 99 %      MAP       --         Physical Exam    Nursing note and vitals reviewed.  Constitutional: He appears well-developed and well-nourished. No distress.   HENT:   Head: Normocephalic and atraumatic.   Eyes: EOM are normal.   Neck: Normal range of motion. Neck supple.   Cardiovascular: Normal rate, regular rhythm, normal heart sounds and intact distal pulses.   No murmur heard.  Pulmonary/Chest: Breath sounds normal. No respiratory distress. He has no wheezes. He has no rhonchi. He has no rales.   Abdominal: Soft. There is no tenderness.   Musculoskeletal: Normal range of motion. He exhibits tenderness (Tenderness to palpation over anterior right shoulder just distal to AC joint, full range of motion of right shoulder but with pain, no crepitus, no skin changes).   Neurological: He is alert and oriented to person, place, and time. He has normal strength. No sensory deficit. GCS score is 15. GCS eye subscore is 4.  GCS verbal subscore is 5. GCS motor subscore is 6.   Sensation intact over deltoid   Skin: Skin is warm and dry. Capillary refill takes less than 2 seconds.   Psychiatric: He has a normal mood and affect.         ED Course   Procedures  Labs Reviewed - No data to display       Imaging Results          X-Ray Shoulder Complete 2 View Right (Final result)  Result time 01/28/20 15:33:07    Final result by Rex Schuler MD (01/28/20 15:33:07)                 Impression:      Negative right shoulder radiographs.      Electronically signed by: Rex Schuler MD  Date:    01/28/2020  Time:    15:33             Narrative:    EXAMINATION:  XR SHOULDER COMPLETE 2 OR MORE VIEWS RIGHT    CLINICAL HISTORY:  Right shoulder pain.    FINDINGS:  Three views of the right shoulder show no acute fracture, dislocation or osseous destructive lesion. The acromioclavicular and coracoclavicular relationships are normal, with normal bony mineralization.                              X-Rays:   Independently Interpreted Readings:   Other Readings:  X-ray shoulder with no acute fracture or dislocation    Medical Decision Making:   ED Management:  59-year-old male presents emergency department with right shoulder pain after a fall.  Imaging negative for acute fracture dislocation.  Likely rotator cuff verses labrum tear.  Patient was given a sling for comfort and instructed on how to do strengthening and range of motion exercises.  Will be prescribed Robaxin and Norco for pain control given cardiac history.  No prescriptions on .  Counseled to follow up with Orthopedic surgery.  Detailed return precautions were discussed.    Hanny Brandt MD  Emergency Medicine  01/28/2020 4:45 PM                                   Clinical Impression:       ICD-10-CM ICD-9-CM   1. Acute pain of right shoulder M25.511 719.41   2. Pain R52 780.96                             Hanny Brandt MD  01/28/20 1646

## 2020-03-27 ENCOUNTER — PATIENT OUTREACH (OUTPATIENT)
Dept: ADMINISTRATIVE | Facility: HOSPITAL | Age: 60
End: 2020-03-27

## 2020-05-29 ENCOUNTER — PATIENT MESSAGE (OUTPATIENT)
Dept: ADMINISTRATIVE | Facility: HOSPITAL | Age: 60
End: 2020-05-29

## 2020-10-05 ENCOUNTER — PATIENT MESSAGE (OUTPATIENT)
Dept: ADMINISTRATIVE | Facility: HOSPITAL | Age: 60
End: 2020-10-05

## 2020-12-02 ENCOUNTER — PATIENT MESSAGE (OUTPATIENT)
Dept: ADMINISTRATIVE | Facility: HOSPITAL | Age: 60
End: 2020-12-02

## 2020-12-30 DIAGNOSIS — Z12.11 COLON CANCER SCREENING: ICD-10-CM

## 2021-01-04 ENCOUNTER — PATIENT MESSAGE (OUTPATIENT)
Dept: ADMINISTRATIVE | Facility: HOSPITAL | Age: 61
End: 2021-01-04

## 2021-03-02 NOTE — PROGRESS NOTES
Patient seen and examined. Patient is progressively increasing activity. No significant complaints.     Sternum: stable, incision CDI  Chest xray: Acceptable post op chest  EKG: NSR     Assessment:  S/P Coronary artery bypass grafting x1, with left internal mammary artery to   left anterior descending.  Excision of mediastinal mass.     Plan:  Can begin driving   Can begin cardiac rehab 6 weeks after operation  We will refer to Dr. Trevizo to assume care   DC lasix, potassium, colace and pain meds  Follow up with Oncology for thymus mass         No scheduled appointment, RTC prn    CTS Attending Note:    I have personally taken the history and examined this patient and agree with the NP's note as stated above. Will ask Dr. Trotter to see regarding thymoma resection.   
5

## 2021-04-06 ENCOUNTER — PATIENT MESSAGE (OUTPATIENT)
Dept: ADMINISTRATIVE | Facility: HOSPITAL | Age: 61
End: 2021-04-06

## 2021-06-22 NOTE — BRIEF OP NOTE
[facility] [place]    SUMMARY     Surgery Date: 6/28/2018     Surgeon(s) and Role:     * Gerardo Trevizo MD - Primary    Assisting Surgeon: None    Pre-op Diagnosis:  Chronic systolic congestive heart failure [I50.22]    Post-op Diagnosis:  Post-Op Diagnosis Codes:     * Chronic systolic congestive heart failure [I50.22], coronary artery disease    Procedure(s) (LRB):  Heart Cath-Bilateral (Bilateral)    Anesthesia: RN IV Sedation    Description of Procedure: Right and left heart catheterization     Description of the findings of the procedure: Normal filling pressures and found to have proximal LAD disease.  There is diffuse disease all the way extending back to the ostium.  There is some distal nondominant left circumflex disease otherwise luminal irregularities and RCA.    Recommendation:  -CT surgery consult asap  -If poor candidate consider staged PCI of LAD  -Tobacco cessation  -Add Imdur    Estimated Blood Loss: Less than 50 cc         Specimens:   Specimen (12h ago through future)    None        
How Severe Is This Condition?: moderate

## 2021-06-25 ENCOUNTER — OFFICE VISIT (OUTPATIENT)
Dept: URGENT CARE | Facility: CLINIC | Age: 61
End: 2021-06-25
Payer: MEDICAID

## 2021-06-25 ENCOUNTER — TELEPHONE (OUTPATIENT)
Dept: FAMILY MEDICINE | Facility: CLINIC | Age: 61
End: 2021-06-25

## 2021-06-25 VITALS
WEIGHT: 174 LBS | RESPIRATION RATE: 18 BRPM | OXYGEN SATURATION: 98 % | HEART RATE: 108 BPM | TEMPERATURE: 98 F | SYSTOLIC BLOOD PRESSURE: 134 MMHG | DIASTOLIC BLOOD PRESSURE: 85 MMHG | HEIGHT: 68 IN | BODY MASS INDEX: 26.37 KG/M2

## 2021-06-25 DIAGNOSIS — R05.9 COUGH: ICD-10-CM

## 2021-06-25 DIAGNOSIS — J20.9 ACUTE BRONCHITIS, UNSPECIFIED ORGANISM: Primary | ICD-10-CM

## 2021-06-25 DIAGNOSIS — Z76.89 ENCOUNTER TO ESTABLISH CARE: ICD-10-CM

## 2021-06-25 PROCEDURE — 71046 X-RAY EXAM CHEST 2 VIEWS: CPT | Mod: S$GLB,,, | Performed by: RADIOLOGY

## 2021-06-25 PROCEDURE — 71046 XR CHEST PA AND LATERAL: ICD-10-PCS | Mod: S$GLB,,, | Performed by: RADIOLOGY

## 2021-06-25 PROCEDURE — 99204 PR OFFICE/OUTPT VISIT, NEW, LEVL IV, 45-59 MIN: ICD-10-PCS | Mod: S$GLB,,, | Performed by: NURSE PRACTITIONER

## 2021-06-25 PROCEDURE — 99204 OFFICE O/P NEW MOD 45 MIN: CPT | Mod: S$GLB,,, | Performed by: NURSE PRACTITIONER

## 2021-06-25 RX ORDER — ALBUTEROL SULFATE 90 UG/1
2 AEROSOL, METERED RESPIRATORY (INHALATION) EVERY 6 HOURS PRN
Qty: 18 G | Refills: 0 | Status: SHIPPED | OUTPATIENT
Start: 2021-06-25

## 2021-06-25 RX ORDER — PREDNISONE 20 MG/1
20 TABLET ORAL 2 TIMES DAILY
Qty: 10 TABLET | Refills: 0 | Status: SHIPPED | OUTPATIENT
Start: 2021-06-25 | End: 2021-06-25

## 2021-06-25 RX ORDER — ATORVASTATIN CALCIUM 20 MG/1
20 TABLET, FILM COATED ORAL DAILY
Status: ON HOLD | COMMUNITY
End: 2023-10-01 | Stop reason: HOSPADM

## 2021-06-25 RX ORDER — DEXAMETHASONE SODIUM PHOSPHATE 4 MG/ML
8 INJECTION, SOLUTION INTRA-ARTICULAR; INTRALESIONAL; INTRAMUSCULAR; INTRAVENOUS; SOFT TISSUE
Status: COMPLETED | OUTPATIENT
Start: 2021-06-25 | End: 2021-06-25

## 2021-06-25 RX ORDER — PREDNISONE 20 MG/1
20 TABLET ORAL 2 TIMES DAILY
Qty: 10 TABLET | Refills: 0 | Status: SHIPPED | OUTPATIENT
Start: 2021-06-26 | End: 2021-07-01

## 2021-06-25 RX ADMIN — DEXAMETHASONE SODIUM PHOSPHATE 8 MG: 4 INJECTION, SOLUTION INTRA-ARTICULAR; INTRALESIONAL; INTRAMUSCULAR; INTRAVENOUS; SOFT TISSUE at 02:06

## 2021-07-06 ENCOUNTER — PATIENT MESSAGE (OUTPATIENT)
Dept: ADMINISTRATIVE | Facility: HOSPITAL | Age: 61
End: 2021-07-06

## 2021-07-07 ENCOUNTER — PATIENT MESSAGE (OUTPATIENT)
Dept: ADMINISTRATIVE | Facility: HOSPITAL | Age: 61
End: 2021-07-07

## 2021-07-09 ENCOUNTER — PATIENT MESSAGE (OUTPATIENT)
Dept: FAMILY MEDICINE | Facility: CLINIC | Age: 61
End: 2021-07-09

## 2021-07-15 ENCOUNTER — PATIENT MESSAGE (OUTPATIENT)
Dept: FAMILY MEDICINE | Facility: CLINIC | Age: 61
End: 2021-07-15

## 2021-08-24 NOTE — PROGRESS NOTES
The patient arrived from the OR and was placed on 100%/16/500/+5/+10 SIMV. An ABG was obtained and the fio2 was weaned to 50%   No

## 2021-10-04 ENCOUNTER — PATIENT MESSAGE (OUTPATIENT)
Dept: ADMINISTRATIVE | Facility: HOSPITAL | Age: 61
End: 2021-10-04

## 2022-05-25 ENCOUNTER — OFFICE VISIT (OUTPATIENT)
Dept: URGENT CARE | Facility: CLINIC | Age: 62
End: 2022-05-25
Payer: MEDICARE

## 2022-05-25 VITALS
HEART RATE: 83 BPM | RESPIRATION RATE: 21 BRPM | TEMPERATURE: 97 F | WEIGHT: 176.38 LBS | OXYGEN SATURATION: 98 % | HEIGHT: 68 IN | BODY MASS INDEX: 26.73 KG/M2 | DIASTOLIC BLOOD PRESSURE: 80 MMHG | SYSTOLIC BLOOD PRESSURE: 140 MMHG

## 2022-05-25 DIAGNOSIS — J40 BRONCHITIS: ICD-10-CM

## 2022-05-25 DIAGNOSIS — R05.9 COUGH: Primary | ICD-10-CM

## 2022-05-25 LAB
CTP QC/QA: YES
CTP QC/QA: YES
FLUAV AG NPH QL: NEGATIVE
FLUBV AG NPH QL: NEGATIVE
SARS-COV-2 AG RESP QL IA.RAPID: NEGATIVE

## 2022-05-25 PROCEDURE — 99204 OFFICE O/P NEW MOD 45 MIN: CPT | Mod: 25,S$GLB,, | Performed by: NURSE PRACTITIONER

## 2022-05-25 PROCEDURE — 3077F SYST BP >= 140 MM HG: CPT | Mod: CPTII,S$GLB,, | Performed by: NURSE PRACTITIONER

## 2022-05-25 PROCEDURE — 87804 INFLUENZA ASSAY W/OPTIC: CPT | Mod: QW,,, | Performed by: NURSE PRACTITIONER

## 2022-05-25 PROCEDURE — 87811 SARS CORONAVIRUS 2 ANTIGEN POCT, MANUAL READ: ICD-10-PCS | Mod: QW,S$GLB,, | Performed by: NURSE PRACTITIONER

## 2022-05-25 PROCEDURE — 3077F PR MOST RECENT SYSTOLIC BLOOD PRESSURE >= 140 MM HG: ICD-10-PCS | Mod: CPTII,S$GLB,, | Performed by: NURSE PRACTITIONER

## 2022-05-25 PROCEDURE — 96372 THER/PROPH/DIAG INJ SC/IM: CPT | Mod: S$GLB,,, | Performed by: NURSE PRACTITIONER

## 2022-05-25 PROCEDURE — 3008F BODY MASS INDEX DOCD: CPT | Mod: CPTII,S$GLB,, | Performed by: NURSE PRACTITIONER

## 2022-05-25 PROCEDURE — 3079F PR MOST RECENT DIASTOLIC BLOOD PRESSURE 80-89 MM HG: ICD-10-PCS | Mod: CPTII,S$GLB,, | Performed by: NURSE PRACTITIONER

## 2022-05-25 PROCEDURE — 1159F PR MEDICATION LIST DOCUMENTED IN MEDICAL RECORD: ICD-10-PCS | Mod: CPTII,S$GLB,, | Performed by: NURSE PRACTITIONER

## 2022-05-25 PROCEDURE — 1160F RVW MEDS BY RX/DR IN RCRD: CPT | Mod: CPTII,S$GLB,, | Performed by: NURSE PRACTITIONER

## 2022-05-25 PROCEDURE — 99204 PR OFFICE/OUTPT VISIT, NEW, LEVL IV, 45-59 MIN: ICD-10-PCS | Mod: 25,S$GLB,, | Performed by: NURSE PRACTITIONER

## 2022-05-25 PROCEDURE — 87804 POCT INFLUENZA A/B: ICD-10-PCS | Mod: 59,QW,, | Performed by: NURSE PRACTITIONER

## 2022-05-25 PROCEDURE — 3008F PR BODY MASS INDEX (BMI) DOCUMENTED: ICD-10-PCS | Mod: CPTII,S$GLB,, | Performed by: NURSE PRACTITIONER

## 2022-05-25 PROCEDURE — 87811 SARS-COV-2 COVID19 W/OPTIC: CPT | Mod: QW,S$GLB,, | Performed by: NURSE PRACTITIONER

## 2022-05-25 PROCEDURE — 1159F MED LIST DOCD IN RCRD: CPT | Mod: CPTII,S$GLB,, | Performed by: NURSE PRACTITIONER

## 2022-05-25 PROCEDURE — 3079F DIAST BP 80-89 MM HG: CPT | Mod: CPTII,S$GLB,, | Performed by: NURSE PRACTITIONER

## 2022-05-25 PROCEDURE — 96372 PR INJECTION,THERAP/PROPH/DIAG2ST, IM OR SUBCUT: ICD-10-PCS | Mod: S$GLB,,, | Performed by: NURSE PRACTITIONER

## 2022-05-25 PROCEDURE — 1160F PR REVIEW ALL MEDS BY PRESCRIBER/CLIN PHARMACIST DOCUMENTED: ICD-10-PCS | Mod: CPTII,S$GLB,, | Performed by: NURSE PRACTITIONER

## 2022-05-25 RX ORDER — PROMETHAZINE HYDROCHLORIDE AND DEXTROMETHORPHAN HYDROBROMIDE 6.25; 15 MG/5ML; MG/5ML
5 SYRUP ORAL NIGHTLY PRN
Qty: 50 ML | Refills: 0 | Status: SHIPPED | OUTPATIENT
Start: 2022-05-25 | End: 2022-06-04

## 2022-05-25 RX ORDER — DEXAMETHASONE SODIUM PHOSPHATE 4 MG/ML
8 INJECTION, SOLUTION INTRA-ARTICULAR; INTRALESIONAL; INTRAMUSCULAR; INTRAVENOUS; SOFT TISSUE
Status: COMPLETED | OUTPATIENT
Start: 2022-05-25 | End: 2022-05-25

## 2022-05-25 RX ORDER — AMOXICILLIN AND CLAVULANATE POTASSIUM 875; 125 MG/1; MG/1
1 TABLET, FILM COATED ORAL EVERY 12 HOURS
Qty: 14 TABLET | Refills: 0 | Status: SHIPPED | OUTPATIENT
Start: 2022-05-25 | End: 2022-06-01

## 2022-05-25 RX ORDER — BENZONATATE 200 MG/1
200 CAPSULE ORAL 3 TIMES DAILY PRN
Qty: 21 CAPSULE | Refills: 0 | Status: SHIPPED | OUTPATIENT
Start: 2022-05-25 | End: 2022-06-01

## 2022-05-25 RX ADMIN — DEXAMETHASONE SODIUM PHOSPHATE 8 MG: 4 INJECTION, SOLUTION INTRA-ARTICULAR; INTRALESIONAL; INTRAMUSCULAR; INTRAVENOUS; SOFT TISSUE at 04:05

## 2022-05-25 NOTE — PROGRESS NOTES
"Subjective:       Patient ID: Anjum Campbell is a 61 y.o. male.    Vitals:  height is 5' 8" (1.727 m) and weight is 80 kg (176 lb 6.4 oz). His temperature is 97.3 °F (36.3 °C). His blood pressure is 140/80 (abnormal) and his pulse is 83. His respiration is 21 (abnormal) and oxygen saturation is 98%.     Chief Complaint: Cough (Patient has had a cough that awakens him from sleep for about one week. He says that he is using four pillow to prop himself up at night. He has been taking "a few antibiotics" that he had left over at home. )    Hx CHF    Cough  This is a new problem. The current episode started in the past 7 days. The problem has been unchanged. The cough is productive of sputum. He has tried a beta-agonist inhaler for the symptoms.       Respiratory: Positive for cough.        Objective:      Physical Exam   Constitutional: He is oriented to person, place, and time.   HENT:   Head: Normocephalic and atraumatic.   Ears:   Right Ear: Tympanic membrane, external ear and ear canal normal.   Left Ear: Tympanic membrane, external ear and ear canal normal.   Nose: Congestion present.   Mouth/Throat: Posterior oropharyngeal erythema present.   Eyes: Conjunctivae are normal.   Neck: Neck supple.   Cardiovascular: Normal rate, regular rhythm, normal heart sounds and normal pulses.   Pulmonary/Chest: He has wheezes (Right upper lobe).   Abdominal: Normal appearance. Soft.   Musculoskeletal: Normal range of motion.         General: Normal range of motion.   Neurological: He is alert and oriented to person, place, and time.   Skin: Skin is warm and dry. Capillary refill takes 2 to 3 seconds.   Psychiatric: His behavior is normal. Mood normal.   Nursing note and vitals reviewed.        Assessment:       1. Cough    2. Bronchitis      Covid antigen: Negative    Influenza A/B: Negative    Plan:         Cough  -     SARS Coronavirus 2 Antigen, POCT Manual Read  -     POCT Influenza A/B  -     benzonatate (TESSALON) " 200 MG capsule; Take 1 capsule (200 mg total) by mouth 3 (three) times daily as needed for Cough.  Dispense: 21 capsule; Refill: 0  -     promethazine-dextromethorphan (PROMETHAZINE-DM) 6.25-15 mg/5 mL Syrp; Take 5 mLs by mouth nightly as needed (cough).  Dispense: 50 mL; Refill: 0    Bronchitis  -     dexamethasone injection 8 mg  -     amoxicillin-clavulanate 875-125mg (AUGMENTIN) 875-125 mg per tablet; Take 1 tablet by mouth every 12 (twelve) hours. for 7 days  Dispense: 14 tablet; Refill: 0    Augmentin 875mg twice daily with food x 7 days  Tessalon 200mg Take 1 capsule by mouth 3 times daily as needed for cough  Promethazine DM 1 teaspoon nightly as needed for cough  Follow up if symptoms persist or worsen

## 2022-05-25 NOTE — PATIENT INSTRUCTIONS
Augmentin 875mg twice daily with food x 7 days  Tessalon 200mg Take 1 capsule by mouth 3 times daily as needed for cough  Promethazine DM 1 teaspoon nightly as needed for cough  Follow up if symptoms persist or worsen

## 2022-06-29 ENCOUNTER — NEW PATIENT (OUTPATIENT)
Dept: URBAN - METROPOLITAN AREA CLINIC 14 | Facility: CLINIC | Age: 62
End: 2022-06-29

## 2022-06-29 DIAGNOSIS — H00.11: ICD-10-CM

## 2022-06-29 PROCEDURE — 92002 INTRM OPH EXAM NEW PATIENT: CPT

## 2022-06-29 PROCEDURE — 11900 INJECT SKIN LESIONS </W 7: CPT

## 2022-06-29 ASSESSMENT — VISUAL ACUITY
OS_SC: 20/25
OD_SC: 20/25

## 2022-07-04 RX ORDER — FLUTICASONE PROPIONATE 50 MCG
SPRAY, SUSPENSION (ML) NASAL
COMMUNITY

## 2022-07-04 RX ORDER — PREDNISONE 1 MG/1
TABLET ORAL
COMMUNITY

## 2022-11-04 PROBLEM — M06.00 SERONEGATIVE RHEUMATOID ARTHRITIS (HCC): Status: ACTIVE | Noted: 2022-11-04

## 2022-11-04 PROBLEM — N20.0 NEPHROLITHIASIS: Status: ACTIVE | Noted: 2022-11-04

## 2022-11-04 PROBLEM — G47.30 SLEEP APNEA IN ADULT: Status: ACTIVE | Noted: 2022-11-04

## 2022-11-04 PROBLEM — E78.00 ELEVATED LDL CHOLESTEROL LEVEL: Status: ACTIVE | Noted: 2022-11-04

## 2022-11-04 PROBLEM — J45.990 EXERCISE-INDUCED ASTHMA: Status: ACTIVE | Noted: 2022-11-04

## 2023-01-19 ENCOUNTER — OFFICE VISIT (OUTPATIENT)
Dept: URGENT CARE | Facility: CLINIC | Age: 63
End: 2023-01-19
Payer: MEDICARE

## 2023-01-19 ENCOUNTER — PATIENT MESSAGE (OUTPATIENT)
Dept: ADMINISTRATIVE | Facility: CLINIC | Age: 63
End: 2023-01-19
Payer: MEDICAID

## 2023-01-19 VITALS
RESPIRATION RATE: 20 BRPM | TEMPERATURE: 98 F | SYSTOLIC BLOOD PRESSURE: 127 MMHG | WEIGHT: 171 LBS | BODY MASS INDEX: 25.33 KG/M2 | HEIGHT: 69 IN | OXYGEN SATURATION: 96 % | DIASTOLIC BLOOD PRESSURE: 77 MMHG | HEART RATE: 83 BPM

## 2023-01-19 DIAGNOSIS — R06.2 WHEEZING: ICD-10-CM

## 2023-01-19 DIAGNOSIS — R05.9 COUGH, UNSPECIFIED TYPE: Primary | ICD-10-CM

## 2023-01-19 DIAGNOSIS — U07.1 COVID: ICD-10-CM

## 2023-01-19 LAB
CTP QC/QA: YES
CTP QC/QA: YES
FLUAV AG NPH QL: NEGATIVE
FLUBV AG NPH QL: NEGATIVE
SARS-COV-2 AG RESP QL IA.RAPID: POSITIVE

## 2023-01-19 PROCEDURE — 87804 INFLUENZA ASSAY W/OPTIC: CPT | Mod: QW,,,

## 2023-01-19 PROCEDURE — 3008F PR BODY MASS INDEX (BMI) DOCUMENTED: ICD-10-PCS | Mod: CPTII,S$GLB,,

## 2023-01-19 PROCEDURE — 87811 SARS CORONAVIRUS 2 ANTIGEN POCT, MANUAL READ: ICD-10-PCS | Mod: QW,S$GLB,,

## 2023-01-19 PROCEDURE — 87804 POCT INFLUENZA A/B: ICD-10-PCS | Mod: QW,,,

## 2023-01-19 PROCEDURE — 1160F PR REVIEW ALL MEDS BY PRESCRIBER/CLIN PHARMACIST DOCUMENTED: ICD-10-PCS | Mod: CPTII,S$GLB,,

## 2023-01-19 PROCEDURE — 3008F BODY MASS INDEX DOCD: CPT | Mod: CPTII,S$GLB,,

## 2023-01-19 PROCEDURE — 99214 PR OFFICE/OUTPT VISIT, EST, LEVL IV, 30-39 MIN: ICD-10-PCS | Mod: 25,S$GLB,,

## 2023-01-19 PROCEDURE — 3078F PR MOST RECENT DIASTOLIC BLOOD PRESSURE < 80 MM HG: ICD-10-PCS | Mod: CPTII,S$GLB,,

## 2023-01-19 PROCEDURE — 1159F MED LIST DOCD IN RCRD: CPT | Mod: CPTII,S$GLB,,

## 2023-01-19 PROCEDURE — 3074F PR MOST RECENT SYSTOLIC BLOOD PRESSURE < 130 MM HG: ICD-10-PCS | Mod: CPTII,S$GLB,,

## 2023-01-19 PROCEDURE — 1160F RVW MEDS BY RX/DR IN RCRD: CPT | Mod: CPTII,S$GLB,,

## 2023-01-19 PROCEDURE — 87811 SARS-COV-2 COVID19 W/OPTIC: CPT | Mod: QW,S$GLB,,

## 2023-01-19 PROCEDURE — 99214 OFFICE O/P EST MOD 30 MIN: CPT | Mod: 25,S$GLB,,

## 2023-01-19 PROCEDURE — 96372 PR INJECTION,THERAP/PROPH/DIAG2ST, IM OR SUBCUT: ICD-10-PCS | Mod: S$GLB,,,

## 2023-01-19 PROCEDURE — 1159F PR MEDICATION LIST DOCUMENTED IN MEDICAL RECORD: ICD-10-PCS | Mod: CPTII,S$GLB,,

## 2023-01-19 PROCEDURE — 3078F DIAST BP <80 MM HG: CPT | Mod: CPTII,S$GLB,,

## 2023-01-19 PROCEDURE — 96372 THER/PROPH/DIAG INJ SC/IM: CPT | Mod: S$GLB,,,

## 2023-01-19 PROCEDURE — 3074F SYST BP LT 130 MM HG: CPT | Mod: CPTII,S$GLB,,

## 2023-01-19 RX ORDER — BENZONATATE 200 MG/1
200 CAPSULE ORAL 3 TIMES DAILY PRN
Qty: 15 CAPSULE | Refills: 0 | Status: SHIPPED | OUTPATIENT
Start: 2023-01-19 | End: 2023-01-24

## 2023-01-19 RX ORDER — DEXAMETHASONE SODIUM PHOSPHATE 4 MG/ML
8 INJECTION, SOLUTION INTRA-ARTICULAR; INTRALESIONAL; INTRAMUSCULAR; INTRAVENOUS; SOFT TISSUE
Status: COMPLETED | OUTPATIENT
Start: 2023-01-19 | End: 2023-01-19

## 2023-01-19 RX ORDER — DOXYCYCLINE HYCLATE 100 MG
100 TABLET ORAL EVERY 12 HOURS
Qty: 14 TABLET | Refills: 0 | Status: SHIPPED | OUTPATIENT
Start: 2023-01-19 | End: 2023-01-26

## 2023-01-19 RX ORDER — AZELASTINE 1 MG/ML
1 SPRAY, METERED NASAL 2 TIMES DAILY
Qty: 30 ML | Refills: 0 | Status: SHIPPED | OUTPATIENT
Start: 2023-01-19 | End: 2023-01-26

## 2023-01-19 RX ADMIN — DEXAMETHASONE SODIUM PHOSPHATE 8 MG: 4 INJECTION, SOLUTION INTRA-ARTICULAR; INTRALESIONAL; INTRAMUSCULAR; INTRAVENOUS; SOFT TISSUE at 03:01

## 2023-01-19 NOTE — PROGRESS NOTES
"Subjective:       Patient ID: Anjum Campbell is a 62 y.o. male.    Vitals:  height is 5' 9" (1.753 m) and weight is 77.6 kg (171 lb). His oral temperature is 98.1 °F (36.7 °C). His blood pressure is 127/77 and his pulse is 83. His respiration is 20 and oxygen saturation is 96%.     Chief Complaint: Cough    Cough  This is a new problem. Episode onset: 6 days ago. The problem has been gradually worsening. The cough is Productive of sputum. Associated symptoms include headaches, myalgias, nasal congestion, shortness of breath (when coughing) and wheezing. Pertinent negatives include no chest pain, chills, ear pain, fever, postnasal drip or sore throat. Associated symptoms comments: Chest congestion. Treatments tried: Mucinex, Theraflu. The treatment provided no relief. His past medical history is significant for bronchitis.     Constitution: Negative for activity change, appetite change, chills, sweating, fever and unexpected weight change.   HENT:  Negative for ear pain, postnasal drip, sinus pain, sinus pressure and sore throat.    Cardiovascular:  Negative for chest pain.   Eyes:  Negative for blurred vision.   Respiratory:  Positive for cough, sputum production, shortness of breath (when coughing) and wheezing. Negative for chest tightness.    Gastrointestinal:  Negative for abdominal pain.   Musculoskeletal:  Positive for muscle ache.   Neurological:  Positive for headaches. Negative for dizziness, history of vertigo and altered mental status.   Psychiatric/Behavioral:  Negative for altered mental status.      Objective:      Physical Exam   Constitutional: He is oriented to person, place, and time.  Non-toxic appearance. He does not appear ill. No distress.   HENT:   Head: Normocephalic.   Eyes: Conjunctivae are normal. Extraocular movement intact   Cardiovascular: Normal rate, normal heart sounds and normal pulses.   Pulmonary/Chest: Effort normal. No respiratory distress. He has wheezes (inspiratory " wheeze on left lung base).   Neurological: no focal deficit. He is alert and oriented to person, place, and time.   Skin: Skin is not diaphoretic. Capillary refill takes 2 to 3 seconds.   Psychiatric: His behavior is normal. Mood normal.       Assessment:       1. Cough, unspecified type    2. Wheezing    3. COVID          Plan:         Cough, unspecified type  -     SARS Coronavirus 2 Antigen, POCT Manual Read  -     POCT Influenza A/B  -     doxycycline (VIBRA-TABS) 100 MG tablet; Take 1 tablet (100 mg total) by mouth every 12 (twelve) hours. for 7 days  Dispense: 14 tablet; Refill: 0    Wheezing  -     dexAMETHasone injection 8 mg  -     doxycycline (VIBRA-TABS) 100 MG tablet; Take 1 tablet (100 mg total) by mouth every 12 (twelve) hours. for 7 days  Dispense: 14 tablet; Refill: 0    COVID  -     doxycycline (VIBRA-TABS) 100 MG tablet; Take 1 tablet (100 mg total) by mouth every 12 (twelve) hours. for 7 days  Dispense: 14 tablet; Refill: 0    Other orders  -     benzonatate (TESSALON) 200 MG capsule; Take 1 capsule (200 mg total) by mouth 3 (three) times daily as needed for Cough.  Dispense: 15 capsule; Refill: 0  -     azelastine (ASTELIN) 137 mcg (0.1 %) nasal spray; 1 spray (137 mcg total) by Nasal route 2 (two) times daily. for 7 days  Dispense: 30 mL; Refill: 0         4  Patient presents with cough, congestion, wheezing and shortness of breath x 6 days, covid positive, covid risk score 4, pt has notable wheezing on exam, is a chronic smoker, denies history of COPD or asthma, but states he has recurrent bronchitis and wheezing when he gets sick, reports shortness of breath during coughing fits, denies active sob or cp, offered CXR in clinic, pt declined, decadron given for wheezing, will cover with doxy given risk factors and productive cough, pt given strict ER precautions if symptoms worsen.

## 2023-01-19 NOTE — PATIENT INSTRUCTIONS
Symptomatic treatment to include:    Rest, increase fluid intake to include electrolyte replacement  Ibuprofen/Tylenol as directed for fever, sore throat, body aches  Zrytec and flonase for sinus symptoms  Tessalon perles cough pills as needed day or night  Mucinex D over the counter as directed for sinus congestion.  Coricidin HBP if you have high blood pressure.  Warm, salt water gargles, over the counter throat lozenges or sprays as desires.   ER for difficulty breathing not relieved by rest, excessive lethargy and/or change in mental status, oxygen level less than 93% or worsening of symptoms.   Follow CDC isolation guidelines as provided  Follow up with PCP if symptoms persist.   Take antibiotics with food.    no

## 2023-01-20 ENCOUNTER — NURSE TRIAGE (OUTPATIENT)
Dept: ADMINISTRATIVE | Facility: CLINIC | Age: 63
End: 2023-01-20
Payer: MEDICAID

## 2023-01-20 NOTE — TELEPHONE ENCOUNTER
Pt called for covid surveillance enrollment #1 and no answer left VM to call ooc nurse as needed and we will reach out again this afternoon. Pt sent previous my chart message as well. I will call back later           Reason for Disposition   Message left on unidentified voice mail. Phone number verified.    Protocols used: No Contact or Duplicate Contact Call-A-[place]

## 2023-01-20 NOTE — TELEPHONE ENCOUNTER
"Pt called for covid surveillance program and he declines all programs he said no trackers. Pt refused to spell his name or give any information pt said "WELL YOU CALLED ME" Again he repeated no covid tracker and hung up. Will remove the tasks       Reason for Disposition   Information only question and nurse able to answer    Protocols used: Information Only Call - No Triage-A-OH    "

## 2023-09-29 ENCOUNTER — HOSPITAL ENCOUNTER (INPATIENT)
Facility: HOSPITAL | Age: 63
LOS: 1 days | Discharge: HOME | DRG: 192 | End: 2023-10-01
Attending: EMERGENCY MEDICINE | Admitting: HOSPITALIST
Payer: MEDICARE

## 2023-09-29 ENCOUNTER — OFFICE VISIT (OUTPATIENT)
Dept: URGENT CARE | Facility: CLINIC | Age: 63
End: 2023-09-29
Payer: MEDICARE

## 2023-09-29 VITALS
HEART RATE: 112 BPM | SYSTOLIC BLOOD PRESSURE: 173 MMHG | TEMPERATURE: 98 F | DIASTOLIC BLOOD PRESSURE: 94 MMHG | RESPIRATION RATE: 18 BRPM | BODY MASS INDEX: 24.59 KG/M2 | HEIGHT: 69 IN | WEIGHT: 166 LBS | OXYGEN SATURATION: 95 %

## 2023-09-29 DIAGNOSIS — Z20.822 COVID-19 VIRUS NOT DETECTED: ICD-10-CM

## 2023-09-29 DIAGNOSIS — R06.00 DYSPNEA, UNSPECIFIED TYPE: Primary | ICD-10-CM

## 2023-09-29 DIAGNOSIS — R09.02 HYPOXIA: ICD-10-CM

## 2023-09-29 DIAGNOSIS — R06.02 SHORTNESS OF BREATH: ICD-10-CM

## 2023-09-29 DIAGNOSIS — R89.4 INFLUENZA A VIRUS NOT DETECTED: ICD-10-CM

## 2023-09-29 DIAGNOSIS — J44.1 COPD EXACERBATION: Primary | ICD-10-CM

## 2023-09-29 DIAGNOSIS — R05.9 COUGH, UNSPECIFIED TYPE: ICD-10-CM

## 2023-09-29 PROBLEM — I25.2 HISTORY OF MI (MYOCARDIAL INFARCTION): Status: ACTIVE | Noted: 2019-08-27

## 2023-09-29 PROBLEM — Z95.810 ICD (IMPLANTABLE CARDIOVERTER-DEFIBRILLATOR) IN PLACE: Status: ACTIVE | Noted: 2020-10-27

## 2023-09-29 PROBLEM — I50.22 CHRONIC SYSTOLIC HEART FAILURE: Status: ACTIVE | Noted: 2019-09-15

## 2023-09-29 LAB
ALBUMIN SERPL BCP-MCNC: 4.6 G/DL (ref 3.5–5.2)
ALP SERPL-CCNC: 78 U/L (ref 55–135)
ALT SERPL W/O P-5'-P-CCNC: 14 U/L (ref 10–44)
ANION GAP SERPL CALC-SCNC: 5 MMOL/L (ref 8–16)
AST SERPL-CCNC: 16 U/L (ref 10–40)
BACTERIA #/AREA URNS HPF: NEGATIVE /HPF
BASOPHILS # BLD AUTO: 0.1 K/UL (ref 0–0.2)
BASOPHILS NFR BLD: 0.8 % (ref 0–1.9)
BILIRUB SERPL-MCNC: 0.5 MG/DL (ref 0.1–1)
BILIRUB UR QL STRIP: NEGATIVE
BNP SERPL-MCNC: 39 PG/ML (ref 0–99)
BUN SERPL-MCNC: 14 MG/DL (ref 8–23)
CALCIUM SERPL-MCNC: 9.6 MG/DL (ref 8.7–10.5)
CHLORIDE SERPL-SCNC: 102 MMOL/L (ref 95–110)
CLARITY UR: CLEAR
CO2 SERPL-SCNC: 32 MMOL/L (ref 23–29)
COLOR UR: YELLOW
CREAT SERPL-MCNC: 1 MG/DL (ref 0.5–1.4)
CTP QC/QA: YES
CTP QC/QA: YES
DIFFERENTIAL METHOD: ABNORMAL
EOSINOPHIL # BLD AUTO: 0.3 K/UL (ref 0–0.5)
EOSINOPHIL NFR BLD: 2.4 % (ref 0–8)
ERYTHROCYTE [DISTWIDTH] IN BLOOD BY AUTOMATED COUNT: 13.6 % (ref 11.5–14.5)
EST. GFR  (NO RACE VARIABLE): >60 ML/MIN/1.73 M^2
FLUAV AG NPH QL: NEGATIVE
FLUBV AG NPH QL: NEGATIVE
GLUCOSE SERPL-MCNC: 101 MG/DL (ref 70–110)
GLUCOSE UR QL STRIP: NEGATIVE
HCT VFR BLD AUTO: 50 % (ref 40–54)
HGB BLD-MCNC: 16.3 G/DL (ref 14–18)
HGB UR QL STRIP: ABNORMAL
HYALINE CASTS #/AREA URNS LPF: 2 /LPF
IMM GRANULOCYTES # BLD AUTO: 0.03 K/UL (ref 0–0.04)
IMM GRANULOCYTES NFR BLD AUTO: 0.2 % (ref 0–0.5)
INFLUENZA A, MOLECULAR: NEGATIVE
INFLUENZA B, MOLECULAR: NEGATIVE
INR PPP: 1 (ref 0.8–1.2)
KETONES UR QL STRIP: NEGATIVE
LACTATE SERPL-SCNC: 0.8 MMOL/L (ref 0.5–1.9)
LEUKOCYTE ESTERASE UR QL STRIP: NEGATIVE
LYMPHOCYTES # BLD AUTO: 1.5 K/UL (ref 1–4.8)
LYMPHOCYTES NFR BLD: 11.5 % (ref 18–48)
MCH RBC QN AUTO: 30.6 PG (ref 27–31)
MCHC RBC AUTO-ENTMCNC: 32.6 G/DL (ref 32–36)
MCV RBC AUTO: 94 FL (ref 82–98)
MICROSCOPIC COMMENT: ABNORMAL
MONOCYTES # BLD AUTO: 1 K/UL (ref 0.3–1)
MONOCYTES NFR BLD: 7.7 % (ref 4–15)
NEUTROPHILS # BLD AUTO: 9.8 K/UL (ref 1.8–7.7)
NEUTROPHILS NFR BLD: 77.4 % (ref 38–73)
NITRITE UR QL STRIP: NEGATIVE
NRBC BLD-RTO: 0 /100 WBC
PH UR STRIP: 6 [PH] (ref 5–8)
PLATELET # BLD AUTO: 185 K/UL (ref 150–450)
PMV BLD AUTO: 10.2 FL (ref 9.2–12.9)
POTASSIUM SERPL-SCNC: 4 MMOL/L (ref 3.5–5.1)
PROT SERPL-MCNC: 7.8 G/DL (ref 6–8.4)
PROT UR QL STRIP: ABNORMAL
PROTHROMBIN TIME: 10.9 SEC (ref 9–12.5)
RBC # BLD AUTO: 5.33 M/UL (ref 4.6–6.2)
RBC #/AREA URNS HPF: 10 /HPF (ref 0–4)
SARS-COV-2 AG RESP QL IA.RAPID: NEGATIVE
SARS-COV-2 RDRP RESP QL NAA+PROBE: NEGATIVE
SODIUM SERPL-SCNC: 139 MMOL/L (ref 136–145)
SP GR UR STRIP: >1.03 (ref 1–1.03)
SPECIMEN SOURCE: NORMAL
SQUAMOUS #/AREA URNS HPF: 1 /HPF
TROPONIN I SERPL HS-MCNC: 7.6 PG/ML (ref 0–14.9)
URN SPEC COLLECT METH UR: ABNORMAL
UROBILINOGEN UR STRIP-ACNC: ABNORMAL EU/DL
WBC # BLD AUTO: 12.67 K/UL (ref 3.9–12.7)
WBC #/AREA URNS HPF: 3 /HPF (ref 0–5)

## 2023-09-29 PROCEDURE — 93010 EKG 12-LEAD: ICD-10-PCS | Mod: ,,, | Performed by: INTERNAL MEDICINE

## 2023-09-29 PROCEDURE — 99285 EMERGENCY DEPT VISIT HI MDM: CPT | Mod: 25

## 2023-09-29 PROCEDURE — 36415 COLL VENOUS BLD VENIPUNCTURE: CPT | Performed by: EMERGENCY MEDICINE

## 2023-09-29 PROCEDURE — 81001 URINALYSIS AUTO W/SCOPE: CPT | Performed by: EMERGENCY MEDICINE

## 2023-09-29 PROCEDURE — 83880 ASSAY OF NATRIURETIC PEPTIDE: CPT

## 2023-09-29 PROCEDURE — 85610 PROTHROMBIN TIME: CPT

## 2023-09-29 PROCEDURE — 83605 ASSAY OF LACTIC ACID: CPT | Performed by: EMERGENCY MEDICINE

## 2023-09-29 PROCEDURE — 80053 COMPREHEN METABOLIC PANEL: CPT

## 2023-09-29 PROCEDURE — U0002 COVID-19 LAB TEST NON-CDC: HCPCS | Performed by: EMERGENCY MEDICINE

## 2023-09-29 PROCEDURE — 93005 ELECTROCARDIOGRAM TRACING: CPT | Performed by: INTERNAL MEDICINE

## 2023-09-29 PROCEDURE — 63600175 PHARM REV CODE 636 W HCPCS: Performed by: EMERGENCY MEDICINE

## 2023-09-29 PROCEDURE — 87040 BLOOD CULTURE FOR BACTERIA: CPT | Performed by: EMERGENCY MEDICINE

## 2023-09-29 PROCEDURE — 27000221 HC OXYGEN, UP TO 24 HOURS

## 2023-09-29 PROCEDURE — 93010 ELECTROCARDIOGRAM REPORT: CPT | Mod: ,,, | Performed by: INTERNAL MEDICINE

## 2023-09-29 PROCEDURE — 25000242 PHARM REV CODE 250 ALT 637 W/ HCPCS: Performed by: EMERGENCY MEDICINE

## 2023-09-29 PROCEDURE — 94644 CONT INHLJ TX 1ST HOUR: CPT

## 2023-09-29 PROCEDURE — 99214 OFFICE O/P EST MOD 30 MIN: CPT | Mod: S$GLB,,,

## 2023-09-29 PROCEDURE — 87804 INFLUENZA ASSAY W/OPTIC: CPT | Mod: QW,,,

## 2023-09-29 PROCEDURE — 99214 PR OFFICE/OUTPT VISIT, EST, LEVL IV, 30-39 MIN: ICD-10-PCS | Mod: S$GLB,,,

## 2023-09-29 PROCEDURE — 99900031 HC PATIENT EDUCATION (STAT)

## 2023-09-29 PROCEDURE — 94761 N-INVAS EAR/PLS OXIMETRY MLT: CPT

## 2023-09-29 PROCEDURE — 84484 ASSAY OF TROPONIN QUANT: CPT | Performed by: EMERGENCY MEDICINE

## 2023-09-29 PROCEDURE — 87811 SARS-COV-2 COVID19 W/OPTIC: CPT | Mod: QW,S$GLB,,

## 2023-09-29 PROCEDURE — 87804 POCT INFLUENZA A/B: ICD-10-PCS | Mod: QW,,,

## 2023-09-29 PROCEDURE — 85025 COMPLETE CBC W/AUTO DIFF WBC: CPT

## 2023-09-29 PROCEDURE — 87502 INFLUENZA DNA AMP PROBE: CPT | Performed by: EMERGENCY MEDICINE

## 2023-09-29 PROCEDURE — 94640 AIRWAY INHALATION TREATMENT: CPT

## 2023-09-29 PROCEDURE — 96375 TX/PRO/DX INJ NEW DRUG ADDON: CPT

## 2023-09-29 PROCEDURE — 87811 SARS CORONAVIRUS 2 ANTIGEN POCT, MANUAL READ: ICD-10-PCS | Mod: QW,S$GLB,,

## 2023-09-29 RX ORDER — LEVALBUTEROL INHALATION SOLUTION 1.25 MG/3ML
3.75 SOLUTION RESPIRATORY (INHALATION)
Status: COMPLETED | OUTPATIENT
Start: 2023-09-29 | End: 2023-09-29

## 2023-09-29 RX ORDER — METHYLPREDNISOLONE SOD SUCC 125 MG
125 VIAL (EA) INJECTION
Status: COMPLETED | OUTPATIENT
Start: 2023-09-29 | End: 2023-09-29

## 2023-09-29 RX ORDER — ATORVASTATIN CALCIUM 40 MG/1
40 TABLET, FILM COATED ORAL
COMMUNITY
Start: 2023-09-29

## 2023-09-29 RX ORDER — AMLODIPINE BESYLATE 5 MG/1
5 TABLET ORAL
COMMUNITY
Start: 2023-08-13 | End: 2024-03-06 | Stop reason: SDUPTHER

## 2023-09-29 RX ORDER — IPRATROPIUM BROMIDE 0.5 MG/2.5ML
1 SOLUTION RESPIRATORY (INHALATION)
Status: COMPLETED | OUTPATIENT
Start: 2023-09-29 | End: 2023-09-29

## 2023-09-29 RX ADMIN — METHYLPREDNISOLONE SODIUM SUCCINATE 125 MG: 125 INJECTION, POWDER, FOR SOLUTION INTRAMUSCULAR; INTRAVENOUS at 11:09

## 2023-09-29 RX ADMIN — LEVALBUTEROL HYDROCHLORIDE 3.75 MG: 1.25 SOLUTION RESPIRATORY (INHALATION) at 11:09

## 2023-09-29 RX ADMIN — IPRATROPIUM BROMIDE 1 MG: 0.5 SOLUTION RESPIRATORY (INHALATION) at 11:09

## 2023-09-29 NOTE — PROGRESS NOTES
"Subjective:      Patient ID: Anjum Campbell is a 63 y.o. male.    Vitals:  height is 5' 9" (1.753 m) and weight is 75.3 kg (166 lb). His temperature is 97.8 °F (36.6 °C). His blood pressure is 173/94 (abnormal) and his pulse is 112 (abnormal). His respiration is 18 and oxygen saturation is 95%.     Chief Complaint: Cough and Sinus Problem    Mr. Campbell, has a past medical history of CHF, coronary artery disease, hypertension, and degenerative disc disease presents to clinic with a chief complaint dyspnea.  He is reporting dyspnea with mild exertion.  Spouse states he is out of breath taking only a few steps. Patient also reports chills last night.  They report they just returned from a trip from [facility].  Patient was outside majority of the times unable to endure activities with family.  Also complaining of abdominal pain, and calf pain.  Negative wells score.  Patient is a smoker.  Denies any history of asthma, COPD, or emphysema.  Patient is barrel-chested.  Reports a chronic cough.  Denies any fluid overload.  He does have bilateral trace edema.  Diffuse Diminished breath sounds with end expiratory wheezing present.  Flushed skin.  Three word orthopnea.  Pursed lip breathing, mildly respiratory distressed.    On chart review, patient has emphysema noted on chest x-ray from 2020.  CHF with reduced ejection fraction             Cough  This is a new problem. The current episode started in the past 7 days. The problem has been gradually worsening. The problem occurs constantly. The cough is Productive of purulent sputum. Associated symptoms include chills, ear pain, a fever (Subjective), headaches, myalgias, nasal congestion, postnasal drip, shortness of breath and wheezing. Pertinent negatives include no sore throat. The symptoms are aggravated by exercise (excertion). Risk factors for lung disease include smoking/tobacco exposure and travel. Treatments tried: nyquil. The treatment provided no relief.   Sinus " Problem  This is a new problem. The current episode started in the past 7 days. The problem has been gradually worsening since onset. The maximum temperature recorded prior to his arrival was 101 - 101.9 F. The pain is mild. Associated symptoms include chills, congestion, coughing, ear pain, headaches and shortness of breath. Pertinent negatives include no sore throat. Past treatments include acetaminophen and oral decongestants. The treatment provided no relief.       Constitution: Positive for activity change, chills and fever (Subjective).   HENT:  Positive for ear pain, congestion and postnasal drip. Negative for sore throat.    Cardiovascular:  Positive for sob on exertion. Negative for leg swelling and palpitations.   Eyes:  Negative for blurred vision.   Respiratory:  Positive for chest tightness, cough, shortness of breath and wheezing.    Gastrointestinal:  Negative for nausea and vomiting.   Genitourinary:  Negative for dysuria, frequency and urgency.   Musculoskeletal:  Positive for muscle ache.   Skin:  Positive for color change (Flushed face and scalp).   Allergic/Immunologic: Positive for chronic cough.   Neurological:  Positive for headaches. Negative for altered mental status.   Hematologic/Lymphatic: Positive for easy bruising/bleeding. Bruises/bleeds easily.   Psychiatric/Behavioral:  Negative for altered mental status.       Objective:     Physical Exam   Constitutional: He is oriented to person, place, and time. He appears well-developed. He is cooperative.  Non-toxic appearance. He appears ill. No distress. obesity  HENT:   Head: Normocephalic and atraumatic.   Ears:   Right Ear: Hearing, tympanic membrane, external ear and ear canal normal.   Left Ear: Hearing, tympanic membrane, external ear and ear canal normal.   Nose: Congestion present. No mucosal edema or nasal deformity. No epistaxis. Right sinus exhibits no maxillary sinus tenderness and no frontal sinus tenderness. Left sinus exhibits  no maxillary sinus tenderness and no frontal sinus tenderness.   Mouth/Throat: Uvula is midline. Mucous membranes are dry. No trismus in the jaw. Normal dentition. No uvula swelling. Posterior oropharyngeal erythema present. Oropharynx is clear.   Eyes: Conjunctivae and lids are normal. Pupils are equal, round, and reactive to light. Extraocular movement intact   Neck: Trachea normal and phonation normal. Neck supple.   Cardiovascular: Regular rhythm, normal heart sounds and normal pulses. Tachycardia present.   Pulmonary/Chest: Tachypnea noted. He is in respiratory distress (Mild respiratory distress). He has decreased breath sounds. He has wheezes.   Abdominal: Normal appearance. Soft. flat abdomen There is no abdominal tenderness. There is left CVA tenderness and right CVA tenderness.   Musculoskeletal: Normal range of motion.         General: Normal range of motion.      Right lower leg: Pitting Edema (Trace) present.      Left lower leg: Pitting Edema (Trace) present.   Lymphadenopathy:     He has no cervical adenopathy.   Neurological: no focal deficit. He is alert, oriented to person, place, and time and at baseline. He exhibits normal muscle tone.   Skin: Skin is warm, dry, intact and not diaphoretic. Capillary refill takes 2 to 3 seconds.   Psychiatric: His speech is normal and behavior is normal. Mood, judgment and thought content normal.   Nursing note and vitals reviewed.      Assessment:     1. Dyspnea, unspecified type    2. Cough, unspecified type    3. Influenza A virus not detected    4. COVID-19 virus not detected        Plan:       Dyspnea, unspecified type    Cough, unspecified type  -     SARS Coronavirus 2 Antigen, POCT Manual Read  -     POCT Influenza A/B Rapid Antigen    Influenza A virus not detected    COVID-19 virus not detected      Suspect COPD exacerbation  Notified RN at [facility] of patient condition being referred to ER for further evaluation and management

## 2023-09-30 PROBLEM — J44.1 COPD EXACERBATION: Status: ACTIVE | Noted: 2023-09-29

## 2023-09-30 LAB
ALBUMIN SERPL BCP-MCNC: 4.4 G/DL (ref 3.5–5.2)
ALP SERPL-CCNC: 75 U/L (ref 55–135)
ALT SERPL W/O P-5'-P-CCNC: 13 U/L (ref 10–44)
ANION GAP SERPL CALC-SCNC: 5 MMOL/L (ref 8–16)
AST SERPL-CCNC: 14 U/L (ref 10–40)
BASOPHILS # BLD AUTO: 0.04 K/UL (ref 0–0.2)
BASOPHILS NFR BLD: 0.4 % (ref 0–1.9)
BILIRUB SERPL-MCNC: 0.4 MG/DL (ref 0.1–1)
BUN SERPL-MCNC: 15 MG/DL (ref 8–23)
CALCIUM SERPL-MCNC: 9.3 MG/DL (ref 8.7–10.5)
CHLORIDE SERPL-SCNC: 102 MMOL/L (ref 95–110)
CO2 SERPL-SCNC: 31 MMOL/L (ref 23–29)
CREAT SERPL-MCNC: 0.9 MG/DL (ref 0.5–1.4)
DIFFERENTIAL METHOD: ABNORMAL
EOSINOPHIL # BLD AUTO: 0 K/UL (ref 0–0.5)
EOSINOPHIL NFR BLD: 0.1 % (ref 0–8)
ERYTHROCYTE [DISTWIDTH] IN BLOOD BY AUTOMATED COUNT: 13.4 % (ref 11.5–14.5)
EST. GFR  (NO RACE VARIABLE): >60 ML/MIN/1.73 M^2
GLUCOSE SERPL-MCNC: 188 MG/DL (ref 70–110)
HCT VFR BLD AUTO: 50.3 % (ref 40–54)
HGB BLD-MCNC: 15.9 G/DL (ref 14–18)
IMM GRANULOCYTES # BLD AUTO: 0.06 K/UL (ref 0–0.04)
IMM GRANULOCYTES NFR BLD AUTO: 0.6 % (ref 0–0.5)
LYMPHOCYTES # BLD AUTO: 0.7 K/UL (ref 1–4.8)
LYMPHOCYTES NFR BLD: 6.5 % (ref 18–48)
MCH RBC QN AUTO: 29.8 PG (ref 27–31)
MCHC RBC AUTO-ENTMCNC: 31.6 G/DL (ref 32–36)
MCV RBC AUTO: 94 FL (ref 82–98)
MONOCYTES # BLD AUTO: 0.2 K/UL (ref 0.3–1)
MONOCYTES NFR BLD: 1.5 % (ref 4–15)
NEUTROPHILS # BLD AUTO: 9.2 K/UL (ref 1.8–7.7)
NEUTROPHILS NFR BLD: 90.9 % (ref 38–73)
NRBC BLD-RTO: 0 /100 WBC
PLATELET # BLD AUTO: 188 K/UL (ref 150–450)
PMV BLD AUTO: 10.2 FL (ref 9.2–12.9)
POTASSIUM SERPL-SCNC: 4.1 MMOL/L (ref 3.5–5.1)
PROT SERPL-MCNC: 7.5 G/DL (ref 6–8.4)
RBC # BLD AUTO: 5.33 M/UL (ref 4.6–6.2)
SODIUM SERPL-SCNC: 138 MMOL/L (ref 136–145)
WBC # BLD AUTO: 10.08 K/UL (ref 3.9–12.7)

## 2023-09-30 PROCEDURE — 25000003 PHARM REV CODE 250

## 2023-09-30 PROCEDURE — 99900031 HC PATIENT EDUCATION (STAT)

## 2023-09-30 PROCEDURE — 96376 TX/PRO/DX INJ SAME DRUG ADON: CPT

## 2023-09-30 PROCEDURE — 27000221 HC OXYGEN, UP TO 24 HOURS

## 2023-09-30 PROCEDURE — 94799 UNLISTED PULMONARY SVC/PX: CPT

## 2023-09-30 PROCEDURE — 94640 AIRWAY INHALATION TREATMENT: CPT

## 2023-09-30 PROCEDURE — 96365 THER/PROPH/DIAG IV INF INIT: CPT

## 2023-09-30 PROCEDURE — S4991 NICOTINE PATCH NONLEGEND: HCPCS

## 2023-09-30 PROCEDURE — 94761 N-INVAS EAR/PLS OXIMETRY MLT: CPT

## 2023-09-30 PROCEDURE — 21400001 HC TELEMETRY ROOM

## 2023-09-30 PROCEDURE — 99900035 HC TECH TIME PER 15 MIN (STAT)

## 2023-09-30 PROCEDURE — 85025 COMPLETE CBC W/AUTO DIFF WBC: CPT

## 2023-09-30 PROCEDURE — 36415 COLL VENOUS BLD VENIPUNCTURE: CPT

## 2023-09-30 PROCEDURE — 25000003 PHARM REV CODE 250: Performed by: NURSE PRACTITIONER

## 2023-09-30 PROCEDURE — 25000242 PHARM REV CODE 250 ALT 637 W/ HCPCS

## 2023-09-30 PROCEDURE — 63600175 PHARM REV CODE 636 W HCPCS

## 2023-09-30 PROCEDURE — 80053 COMPREHEN METABOLIC PANEL: CPT

## 2023-09-30 RX ORDER — LANOLIN ALCOHOL/MO/W.PET/CERES
800 CREAM (GRAM) TOPICAL
Status: DISCONTINUED | OUTPATIENT
Start: 2023-09-30 | End: 2023-10-01 | Stop reason: HOSPADM

## 2023-09-30 RX ORDER — SODIUM,POTASSIUM PHOSPHATES 280-250MG
2 POWDER IN PACKET (EA) ORAL
Status: DISCONTINUED | OUTPATIENT
Start: 2023-09-30 | End: 2023-10-01 | Stop reason: HOSPADM

## 2023-09-30 RX ORDER — LEVOFLOXACIN 5 MG/ML
750 INJECTION, SOLUTION INTRAVENOUS
Status: DISCONTINUED | OUTPATIENT
Start: 2023-09-30 | End: 2023-10-01 | Stop reason: HOSPADM

## 2023-09-30 RX ORDER — CARVEDILOL 12.5 MG/1
12.5 TABLET ORAL 2 TIMES DAILY WITH MEALS
Status: DISCONTINUED | OUTPATIENT
Start: 2023-09-30 | End: 2023-10-01 | Stop reason: HOSPADM

## 2023-09-30 RX ORDER — POLYETHYLENE GLYCOL 3350 17 G/17G
17 POWDER, FOR SOLUTION ORAL DAILY
Status: DISCONTINUED | OUTPATIENT
Start: 2023-09-30 | End: 2023-10-01 | Stop reason: HOSPADM

## 2023-09-30 RX ORDER — IPRATROPIUM BROMIDE 0.5 MG/2.5ML
0.5 SOLUTION RESPIRATORY (INHALATION) EVERY 4 HOURS
Status: DISCONTINUED | OUTPATIENT
Start: 2023-09-30 | End: 2023-10-01 | Stop reason: HOSPADM

## 2023-09-30 RX ORDER — ASPIRIN 325 MG
325 TABLET, DELAYED RELEASE (ENTERIC COATED) ORAL DAILY
Status: DISCONTINUED | OUTPATIENT
Start: 2023-09-30 | End: 2023-10-01 | Stop reason: HOSPADM

## 2023-09-30 RX ORDER — NICOTINE 7MG/24HR
1 PATCH, TRANSDERMAL 24 HOURS TRANSDERMAL DAILY
Status: DISCONTINUED | OUTPATIENT
Start: 2023-09-30 | End: 2023-10-01 | Stop reason: HOSPADM

## 2023-09-30 RX ORDER — ONDANSETRON 2 MG/ML
4 INJECTION INTRAMUSCULAR; INTRAVENOUS EVERY 8 HOURS PRN
Status: DISCONTINUED | OUTPATIENT
Start: 2023-09-30 | End: 2023-10-01 | Stop reason: HOSPADM

## 2023-09-30 RX ORDER — TALC
6 POWDER (GRAM) TOPICAL NIGHTLY PRN
Status: DISCONTINUED | OUTPATIENT
Start: 2023-09-30 | End: 2023-10-01 | Stop reason: HOSPADM

## 2023-09-30 RX ORDER — ATORVASTATIN CALCIUM 40 MG/1
40 TABLET, FILM COATED ORAL DAILY
Status: DISCONTINUED | OUTPATIENT
Start: 2023-09-30 | End: 2023-10-01 | Stop reason: HOSPADM

## 2023-09-30 RX ORDER — LEVALBUTEROL INHALATION SOLUTION 1.25 MG/3ML
1.25 SOLUTION RESPIRATORY (INHALATION) EVERY 4 HOURS
Status: DISCONTINUED | OUTPATIENT
Start: 2023-09-30 | End: 2023-10-01 | Stop reason: HOSPADM

## 2023-09-30 RX ORDER — SODIUM CHLORIDE 0.9 % (FLUSH) 0.9 %
2 SYRINGE (ML) INJECTION
Status: DISCONTINUED | OUTPATIENT
Start: 2023-09-30 | End: 2023-10-01 | Stop reason: HOSPADM

## 2023-09-30 RX ORDER — AMLODIPINE BESYLATE 5 MG/1
5 TABLET ORAL DAILY
Status: DISCONTINUED | OUTPATIENT
Start: 2023-09-30 | End: 2023-10-01 | Stop reason: HOSPADM

## 2023-09-30 RX ORDER — ACETAMINOPHEN 325 MG/1
650 TABLET ORAL EVERY 8 HOURS PRN
Status: DISCONTINUED | OUTPATIENT
Start: 2023-09-30 | End: 2023-10-01 | Stop reason: HOSPADM

## 2023-09-30 RX ORDER — ENOXAPARIN SODIUM 100 MG/ML
40 INJECTION SUBCUTANEOUS EVERY 24 HOURS
Status: DISCONTINUED | OUTPATIENT
Start: 2023-09-30 | End: 2023-10-01 | Stop reason: HOSPADM

## 2023-09-30 RX ORDER — PROMETHAZINE HYDROCHLORIDE 25 MG/1
25 TABLET ORAL EVERY 6 HOURS PRN
Status: DISCONTINUED | OUTPATIENT
Start: 2023-09-30 | End: 2023-10-01 | Stop reason: HOSPADM

## 2023-09-30 RX ADMIN — IPRATROPIUM BROMIDE 0.5 MG: 0.5 SOLUTION RESPIRATORY (INHALATION) at 12:09

## 2023-09-30 RX ADMIN — METHYLPREDNISOLONE SODIUM SUCCINATE 40 MG: 40 INJECTION, POWDER, FOR SOLUTION INTRAMUSCULAR; INTRAVENOUS at 11:09

## 2023-09-30 RX ADMIN — IPRATROPIUM BROMIDE 0.5 MG: 0.5 SOLUTION RESPIRATORY (INHALATION) at 08:09

## 2023-09-30 RX ADMIN — IPRATROPIUM BROMIDE 0.5 MG: 0.5 SOLUTION RESPIRATORY (INHALATION) at 03:09

## 2023-09-30 RX ADMIN — IPRATROPIUM BROMIDE 0.5 MG: 0.5 SOLUTION RESPIRATORY (INHALATION) at 06:09

## 2023-09-30 RX ADMIN — METHYLPREDNISOLONE SODIUM SUCCINATE 40 MG: 40 INJECTION, POWDER, FOR SOLUTION INTRAMUSCULAR; INTRAVENOUS at 05:09

## 2023-09-30 RX ADMIN — IPRATROPIUM BROMIDE 0.5 MG: 0.5 SOLUTION RESPIRATORY (INHALATION) at 11:09

## 2023-09-30 RX ADMIN — LEVALBUTEROL HYDROCHLORIDE 1.25 MG: 1.25 SOLUTION RESPIRATORY (INHALATION) at 10:09

## 2023-09-30 RX ADMIN — LEVALBUTEROL HYDROCHLORIDE 1.25 MG: 1.25 SOLUTION RESPIRATORY (INHALATION) at 03:09

## 2023-09-30 RX ADMIN — LEVALBUTEROL HYDROCHLORIDE 1.25 MG: 1.25 SOLUTION RESPIRATORY (INHALATION) at 08:09

## 2023-09-30 RX ADMIN — AMLODIPINE BESYLATE 5 MG: 5 TABLET ORAL at 09:09

## 2023-09-30 RX ADMIN — LEVALBUTEROL HYDROCHLORIDE 1.25 MG: 1.25 SOLUTION RESPIRATORY (INHALATION) at 07:09

## 2023-09-30 RX ADMIN — CARVEDILOL 12.5 MG: 12.5 TABLET, FILM COATED ORAL at 09:09

## 2023-09-30 RX ADMIN — ATORVASTATIN CALCIUM 40 MG: 40 TABLET, FILM COATED ORAL at 09:09

## 2023-09-30 RX ADMIN — LEVOFLOXACIN 750 MG: 5 INJECTION, SOLUTION INTRAVENOUS at 02:09

## 2023-09-30 RX ADMIN — NICOTINE 1 PATCH: 7 PATCH, EXTENDED RELEASE TRANSDERMAL at 09:09

## 2023-09-30 RX ADMIN — LEVALBUTEROL HYDROCHLORIDE 1.25 MG: 1.25 SOLUTION RESPIRATORY (INHALATION) at 12:09

## 2023-09-30 RX ADMIN — ASPIRIN 325 MG: 325 TABLET, COATED ORAL at 09:09

## 2023-09-30 RX ADMIN — GUAIFENESIN AND DEXTROMETHORPHAN HYDROBROMIDE 1 TABLET: 600; 30 TABLET, EXTENDED RELEASE ORAL at 08:09

## 2023-09-30 RX ADMIN — IPRATROPIUM BROMIDE 0.5 MG: 0.5 SOLUTION RESPIRATORY (INHALATION) at 10:09

## 2023-09-30 RX ADMIN — ENOXAPARIN SODIUM 40 MG: 40 INJECTION SUBCUTANEOUS at 08:09

## 2023-09-30 RX ADMIN — LEVALBUTEROL HYDROCHLORIDE 1.25 MG: 1.25 SOLUTION RESPIRATORY (INHALATION) at 11:09

## 2023-09-30 NOTE — PLAN OF CARE
[facility]  Initial Discharge Assessment       Primary Care Provider: Enmanuel Doe MD    Admission Diagnosis: COPD exacerbation [J44.1]    Admission Date: 9/29/2023  Expected Discharge Date:     Pt is a 63-year-old male who arrived from home with COPD exacerbation. The assessment was completed at the patient's bedside. The Pt does not have a Living Eric or Advance Directive. The Pt is alert to person, places, and times. PCP last seen one year ago. Pt denies Coumadin, dialysis, DME and HH.  Pt is capable of performing ADLs without assistance. Pt's drives to and from medical appointments. Pt takes medication as prescribed daily. The patient denies readmission to the hospital in the past thirty days. Discharge plan is home with family, wife, Barb Campbell [phone] will drive him home. CM reviewed the chart and will continue to monitor.      Transition of Care Barriers: None    Payor: [employer] MANAGED MEDICARE / Plan: [employer]ET[employer] MEDICARE PLAN PPO / Product Type: Medicare Advantage /     Extended Emergency Contact Information  Primary Emergency Contact: Barb Campbell   [place]  Mobile Phone: [phone]  Relation: Spouse    Discharge Plan A: Home with family  Discharge Plan B: Home      [id number] - [place] [place] - [address] AT [facility]  [address]  [place] [address]  Phone: [phone] Fax: [phone]    [facility] [address]  [address]  [facility] [address]  Phone: [phone] Fax: [phone]      Initial Assessment (most recent)       Adult Discharge Assessment - 09/30/23 1559          Discharge Assessment    Assessment Type Discharge Planning Assessment     Confirmed/corrected address, phone number and insurance Yes     Confirmed Demographics Correct on Facesheet     Source of Information patient     When was your last doctors appointment? --   1 year ago    Does patient/caregiver understand  observation status Yes     Communicated CONNIE with patient/caregiver Date not available/Unable to determine     Reason For Admission COPD exacerbation     People in Home spouse     Facility Arrived From: home     Do you expect to return to your current living situation? Yes     Do you have help at home or someone to help you manage your care at home? Yes     Who are your caregiver(s) and their phone number(s)? Barb Campbell/wife [phone]     Prior to hospitilization cognitive status: Alert/Oriented     Current cognitive status: Alert/Oriented     Equipment Currently Used at Home none     Readmission within 30 days? No     Patient currently being followed by outpatient case management? No     Do you currently have service(s) that help you manage your care at home? No     Do you take prescription medications? Yes     Do you have prescription coverage? Yes     Coverage Payor:  AETNA MANAGED MEDICARE - AET MEDICARE PLAN PPO     Do you have any problems affording any of your prescribed medications? No     Is the patient taking medications as prescribed? yes     Who is going to help you get home at discharge? Barb Campbell/wife [phone]     How do you get to doctors appointments? car, drives self     Are you on dialysis? No     Do you take coumadin? No     DME Needed Upon Discharge  oxygen     Discharge Plan discussed with: Patient     Transition of Care Barriers None     Discharge Plan A Home with family     Discharge Plan B Home

## 2023-09-30 NOTE — PLAN OF CARE
Problem: Adult Inpatient Plan of Care  Goal: Plan of Care Review  9/30/2023 0612 by Flor Hayden, RN  Outcome: Ongoing, Progressing  9/30/2023 0612 by Flor Hayden, RN  Outcome: Ongoing, Progressing     POC reviewed with patient this shift.  A/O x4.  Respirations unlabored.  Mild expiratory wheezing noted.  SOB noted with exertion.  Tolerates breathing treatments well.  O2 continues at 2L/NC.  Skin w/d.  Continent of b/b.  VSS.  See flowsheet for full assessment.  Able to verbalize wants/needs.  No c/o pain or discomfort at this time.

## 2023-09-30 NOTE — ASSESSMENT & PLAN NOTE
I talked to the patient about the importance of smoking cessation   Ordered Nicotine patches daily while inpatient

## 2023-09-30 NOTE — ASSESSMENT & PLAN NOTE
Patient came into the ED for increased SOB, coughing, fatigue, and wheezing with subjective fever/chills for the last 3 days  Patient states that he has never been diagnosed with COPD or seen a pulmonologist however this is the second time he has had to come into the hospital with similar symptoms     O2 saturation on arrival- 89% on RA which improved to the mid 90s on 2 L    Started on Nebulizer treatments in the ED  - Nebulizer treatments ordered q4h     Given Methylprednisolone 125 in the ED  - Ordered Methylprednisolone 40 q6h    Levofloxacin ordered   Oxygen ordered PRN to keep O2 saturation 90% or higher   Continue to monitor vitals

## 2023-09-30 NOTE — ASSESSMENT & PLAN NOTE
Assistance with smoking cessation was offered, including:  [x]  Medications  [x]  Counseling  []  Printed Information on Smoking Cessation  []  Referral to a Smoking Cessation Program    Patient was counseled regarding smoking for >10 minutes.

## 2023-09-30 NOTE — ASSESSMENT & PLAN NOTE
Chronic, controlled.  Latest blood pressure and vitals reviewed-     Temp:  [97.7 °F (36.5 °C)-98.3 °F (36.8 °C)]   Pulse:  []   Resp:  [16-27]   BP: (128-198)/()   SpO2:  [89 %-98 %] .   Home meds for hypertension were reviewed and noted below-  Hypertension Medications             amLODIPine (NORVASC) 5 MG tablet Take 5 mg by mouth.    carvedilol (COREG) 12.5 MG tablet Take 2 tablets (25 mg total) by mouth 2 (two) times daily with meals.          While in the hospital, will manage blood pressure as follows; Continue home antihypertensive regimen    Will utilize p.r.n. blood pressure medication only if patient's blood pressure greater than 180/110 and he develops symptoms such as worsening chest pain or shortness of breath.

## 2023-09-30 NOTE — SUBJECTIVE & OBJECTIVE
Past Medical History:   Diagnosis Date    Back injury 1994    Chest pain     CHF (congestive heart failure)     Hypertension     Thymoma 2018       Past Surgical History:   Procedure Laterality Date    back fusion   1995    lumbar spine    CATHETERIZATION OF BOTH LEFT AND RIGHT HEART Bilateral 6/28/2018    Procedure: Heart Cath-Bilateral;  Surgeon: Gerardo Trevizo MD;  Location: [facility] CATH LAB;  Service: Cardiology;  Laterality: Bilateral;  RN PREOP 6/27/2018    CORONARY ARTERY BYPASS GRAFT (CABG) N/A 8/13/2018    Procedure: CABGx1;  Surgeon: Orville Payne MD;  Location: [address];  Service: Cardiovascular;  Laterality: N/A;    MEDIASTINAL MASS EXCISION  8/13/2018    Procedure: EXCISION, MASS, MEDIASTINUM;  Surgeon: Orville Payne MD;  Location: [facility] OR [address] [place] [address];  Service: Cardiovascular;;       Review of patient's allergies indicates:  No Known Allergies    No current facility-administered medications on file prior to encounter.     Current Outpatient Medications on File Prior to Encounter   Medication Sig    albuterol (PROVENTIL/VENTOLIN HFA) 90 mcg/actuation inhaler Inhale 2 puffs into the lungs every 6 (six) hours as needed for Wheezing. Rescue    amLODIPine (NORVASC) 5 MG tablet Take 5 mg by mouth.    aspirin (ECOTRIN) 325 MG EC tablet Take 325 mg by mouth once daily.    atorvastatin (LIPITOR) 40 MG tablet Take 40 mg by mouth.    atorvastatin (LIPITOR) 20 MG tablet Take 20 mg by mouth once daily.    azelastine (ASTELIN) 137 mcg (0.1 %) nasal spray 1 spray (137 mcg total) by Nasal route 2 (two) times daily. for 7 days    carvedilol (COREG) 12.5 MG tablet Take 2 tablets (25 mg total) by mouth 2 (two) times daily with meals.    pulse oximeter (PULSE OXIMETER) device by Apply Externally route 2 (two) times a day. Use twice daily at 8 AM and 3 PM and record the value in [employer] as directed.     Family History       Problem Relation (Age of Onset)    Lung cancer Father          Tobacco Use    Smoking  status: Every Day     Current packs/day: 0.25     Average packs/day: 0.3 packs/day for 30.0 years (7.5 ttl pk-yrs)     Types: Cigarettes    Smokeless tobacco: Never    Tobacco comments:     9/27/18 ---only a few cigs per day, trying to quit   Substance and Sexual Activity    Alcohol use: Yes     Comment: occaSIONAL    Drug use: No    Sexual activity: Yes     Review of Systems   Constitutional:  Positive for chills, fatigue and fever (subjective). Negative for activity change, appetite change and diaphoresis.   HENT:  Negative for congestion, ear discharge, ear pain, postnasal drip, rhinorrhea, sinus pressure and sore throat.    Eyes:  Positive for visual disturbance (blurry vision with SOB). Negative for pain, discharge, redness and itching.   Respiratory:  Positive for cough, shortness of breath and wheezing. Negative for chest tightness.    Cardiovascular:  Negative for chest pain and leg swelling.   Gastrointestinal:  Negative for abdominal pain, constipation, diarrhea, nausea and vomiting.   Genitourinary:  Negative for dysuria, frequency, hematuria and urgency.   Musculoskeletal:  Negative for back pain.   Skin:  Negative for color change, pallor and rash.   Neurological:  Positive for light-headedness. Negative for dizziness, syncope, facial asymmetry and weakness.   Psychiatric/Behavioral:  Negative for behavioral problems, confusion and hallucinations.      Objective:     Vital Signs (Most Recent):  Temp: 97.8 °F (36.6 °C) (09/29/23 1836)  Pulse: 102 (09/30/23 0027)  Resp: (!) 22 (09/30/23 0027)  BP: (!) 149/93 (09/29/23 2200)  SpO2: 96 % (09/30/23 0027) Vital Signs (24h Range):  Temp:  [97.8 °F (36.6 °C)] 97.8 °F (36.6 °C)  Pulse:  [101-112] 102  Resp:  [18-27] 22  SpO2:  [89 %-98 %] 96 %  BP: (149-198)/() 149/93     Weight: 75.8 kg (167 lb)  Body mass index is 24.66 kg/m².     Physical Exam  Vitals and nursing note reviewed.   Constitutional:       General: He is not in acute distress.      Appearance: Normal appearance. He is not ill-appearing, toxic-appearing or diaphoretic.   HENT:      Head: Normocephalic and atraumatic.      Nose: Nose normal.      Mouth/Throat:      Mouth: Mucous membranes are moist.   Eyes:      Extraocular Movements: Extraocular movements intact.   Cardiovascular:      Rate and Rhythm: Normal rate and regular rhythm.      Heart sounds: Normal heart sounds. No murmur heard.  Pulmonary:      Effort: Respiratory distress present.      Breath sounds: Wheezing and rhonchi present.      Comments: Tachypnea  Poor air movement diffusely  Abdominal:      General: Bowel sounds are normal.      Palpations: Abdomen is soft. There is no mass.      Tenderness: There is no abdominal tenderness. There is no guarding.      Hernia: No hernia is present.   Musculoskeletal:         General: No swelling, tenderness or deformity. Normal range of motion.      Cervical back: Normal range of motion. No rigidity or tenderness.   Skin:     General: Skin is warm and dry.      Coloration: Skin is not jaundiced.      Findings: No bruising or erythema.   Neurological:      General: No focal deficit present.      Mental Status: He is alert and oriented to person, place, and time. Mental status is at baseline.   Psychiatric:         Mood and Affect: Mood is anxious.         Behavior: Behavior normal.                Significant Labs: All pertinent labs within the past 24 hours have been reviewed.  CBC:   Recent Labs   Lab 09/29/23 1926   WBC 12.67   HGB 16.3   HCT 50.0        CMP:   Recent Labs   Lab 09/29/23 1926      K 4.0      CO2 32*      BUN 14   CREATININE 1.0   CALCIUM 9.6   PROT 7.8   ALBUMIN 4.6   BILITOT 0.5   ALKPHOS 78   AST 16   ALT 14   ANIONGAP 5*     Cardiac Markers:   Recent Labs   Lab 09/29/23 1926   BNP 39     Coagulation:   Recent Labs   Lab 09/29/23 1926   INR 1.0     Troponin:   Recent Labs   Lab 09/29/23 2106   TROPONINIHS 7.6     Urine Studies:   Recent  Labs   Lab 09/29/23 2229   COLORU Yellow   APPEARANCEUA Clear   PHUR 6.0   SPECGRAV >1.030*   PROTEINUA Trace*   GLUCUA Negative   KETONESU Negative   BILIRUBINUA Negative   OCCULTUA 2+*   NITRITE Negative   UROBILINOGEN 2.0-3.0*   LEUKOCYTESUR Negative   RBCUA 10*   WBCUA 3   BACTERIA Negative   SQUAMEPITHEL 1   HYALINECASTS 2*       Significant Imaging: I have reviewed all pertinent imaging results/findings within the past 24 hours.

## 2023-09-30 NOTE — RESPIRATORY THERAPY
09/30/23 0354   Patient Assessment/Suction   Level of Consciousness (AVPU) alert   Respiratory Effort Unlabored   Expansion/Accessory Muscles/Retractions no use of accessory muscles   All Lung Fields Breath Sounds wheezes, expiratory   PRE-TX-O2   Device (Oxygen Therapy) nasal cannula   $ Is the patient on Low Flow Oxygen? Yes   Flow (L/min) 2   SpO2 (!) 90 %   Pulse Oximetry Type Intermittent   $ Pulse Oximetry - Multiple Charge Pulse Oximetry - Multiple   Pulse 105   Resp 18   Aerosol Therapy   $ Aerosol Therapy Charges Aerosol Treatment   Daily Review of Necessity (SVN) completed   Respiratory Treatment Status (SVN) given   Treatment Route (SVN) mask;oxygen   Patient Position (SVN) HOB elevated   Post Treatment Assessment (SVN) breath sounds unchanged   Signs of Intolerance (SVN) none   Breath Sounds Post-Respiratory Treatment   Throughout All Fields Post-Treatment All Fields   Throughout All Fields Post-Treatment no change   Post-treatment Heart Rate (beats/min) 108   Post-treatment Resp Rate (breaths/min) 18   Education   $ Education Bronchodilator;15 min   Respiratory Evaluation   $ Care Plan Tech Time 15 min   $ Eval/Re-eval Charges Evaluation

## 2023-09-30 NOTE — NURSING
Pt arrived to unit via w/c with x1 transporter in attendance.  A/O x4.  Respirations unlabored.  O2 @2L/NC.  Skin WDI.  Pacemaker present.  See flowsheet for full assessment.  Able to verbalize wants/needs.  No c/o pain or discomfort at this time.  Fall/safety precautions initiated.

## 2023-09-30 NOTE — ED PROVIDER NOTES
Encounter Date: 9/29/2023       History     Chief Complaint   Patient presents with    Shortness of Breath     Sent from urgent care for low pulse ox    Cough     Patient presents emergency department with reported shortness of breath cough worsening over last few days subjective fever and chills patient reports symptoms have been worsening he was seen at urgent care found to be hypoxic and told to come to the emergency department for further evaluation patient denies previous oxygen use he does have a history of coronary disease status post CABG he states he has been on Lasix in the past but is not currently taking Lasix he denies any sick contacts at home states he did go out of town recently and was sick while he was out of town        Review of patient's allergies indicates:  No Known Allergies  Past Medical History:   Diagnosis Date    Back injury 1994    Chest pain     CHF (congestive heart failure)     Hypertension     Thymoma 2018     Past Surgical History:   Procedure Laterality Date    back fusion   1995    lumbar spine    CATHETERIZATION OF BOTH LEFT AND RIGHT HEART Bilateral 6/28/2018    Procedure: Heart Cath-Bilateral;  Surgeon: Gerardo Trevizo MD;  Location: [facility] CATH LAB;  Service: Cardiology;  Laterality: Bilateral;  RN PREOP 6/27/2018    CORONARY ARTERY BYPASS GRAFT (CABG) N/A 8/13/2018    Procedure: CABGx1;  Surgeon: Orville Payne MD;  Location: [facility] OR [address] [place] [address];  Service: Cardiovascular;  Laterality: N/A;    MEDIASTINAL MASS EXCISION  8/13/2018    Procedure: EXCISION, MASS, MEDIASTINUM;  Surgeon: Orville Payne MD;  Location: [facility] OR [address] [place] [address];  Service: Cardiovascular;;     Family History   Problem Relation Age of Onset    Lung cancer Father      Social History     Tobacco Use    Smoking status: Every Day     Current packs/day: 0.25     Average packs/day: 0.3 packs/day for 30.0 years (7.5 ttl pk-yrs)     Types: Cigarettes    Smokeless tobacco: Never    Tobacco comments:     9/27/18  ---only a few cigs per day, trying to quit   Substance Use Topics    Alcohol use: Yes     Comment: occaSIONAL    Drug use: No     Review of Systems   Constitutional:  Positive for chills, fatigue and fever.   HENT:  Negative for congestion and nosebleeds.    Respiratory:  Positive for cough, shortness of breath and wheezing. Negative for chest tightness.    Cardiovascular:  Negative for chest pain and leg swelling.   Gastrointestinal:  Negative for abdominal pain, diarrhea, nausea and vomiting.   Skin:  Negative for rash.   All other systems reviewed and are negative.      Physical Exam     Initial Vitals [09/29/23 1836]   BP Pulse Resp Temp SpO2   (!) 198/106 (!) 112 20 97.8 °F (36.6 °C) (!) 89 %      MAP       --         Physical Exam    Constitutional: He appears well-developed and well-nourished. No distress.   HENT:   Head: Normocephalic and atraumatic.   Right Ear: External ear normal.   Left Ear: External ear normal.   Mouth/Throat: Oropharynx is clear and moist.   Eyes: Pupils are equal, round, and reactive to light.   Neck: Neck supple.   Normal range of motion.  Cardiovascular:  Normal rate, regular rhythm, S1 normal, S2 normal, normal heart sounds and intact distal pulses.           Pulmonary/Chest: Tachypnea noted. He is in respiratory distress. He has decreased breath sounds. He has wheezes. He has rhonchi.   Abdominal: Abdomen is soft. Bowel sounds are normal. There is no abdominal tenderness.   Musculoskeletal:         General: Normal range of motion.      Cervical back: Normal range of motion and neck supple.     Neurological: He is alert and oriented to person, place, and time. GCS eye subscore is 4. GCS verbal subscore is 5. GCS motor subscore is 6.   Skin: Skin is warm and dry. No rash noted.   Psychiatric: He has a normal mood and affect. His behavior is normal.         ED Course   Procedures  Labs Reviewed   CBC W/ AUTO DIFFERENTIAL - Abnormal; Notable for the following components:       Result  Value    Gran # (ANC) 9.8 (*)     Gran % 77.4 (*)     Lymph % 11.5 (*)     All other components within normal limits   COMPREHENSIVE METABOLIC PANEL - Abnormal; Notable for the following components:    CO2 32 (*)     Anion Gap 5 (*)     All other components within normal limits   URINALYSIS, REFLEX TO URINE CULTURE - Abnormal; Notable for the following components:    Specific Gravity, UA >1.030 (*)     Protein, UA Trace (*)     Occult Blood UA 2+ (*)     Urobilinogen, UA 2.0-3.0 (*)     All other components within normal limits    Narrative:     Specimen Source->Urine   URINALYSIS MICROSCOPIC - Abnormal; Notable for the following components:    RBC, UA 10 (*)     Hyaline Casts, UA 2 (*)     All other components within normal limits    Narrative:     Specimen Source->Urine   CULTURE, BLOOD   CULTURE, BLOOD   B-TYPE NATRIURETIC PEPTIDE   PROTIME-INR   SARS-COV-2 RNA AMPLIFICATION, QUAL   TROPONIN I HIGH SENSITIVITY   LACTIC ACID, PLASMA   INFLUENZA A AND B ANTIGEN    Narrative:     Specimen Source->Nasopharyngeal Swab          Imaging Results              X-Ray Chest PA And Lateral (Final result)  Result time 09/29/23 19:20:58      Final result by Sidney Sutherland MD (09/29/23 19:20:58)                   Narrative:    History: Shortness of breath and cough.    FINDINGS: PA and lateral views of the chest were obtained. Comparison to previous study dated June 25, 2021. Evidence of previous sternotomy is seen. Left-sided cardiac pacemaker is noted in place. Pulmonary hyperexpansion is present. No focal consolidation or pleural effusion is seen. Cardiac silhouette is within normal limits in size.    IMPRESSION:  1. Emphysematous changes.  2. No acute cardiopulmonary change seen.    Electronically signed by:  Sidney Sutherland MD  09/29/2023 07:20 PM CDT Workstation: [id number]                                     Medications   levalbuterol nebulizer solution 3.75 mg (3.75 mg Nebulization Given 9/29/23 8800)   ipratropium  0.02 % nebulizer solution 1 mg (1 mg Nebulization Given 9/29/23 2341)   methylPREDNISolone sodium succinate injection 125 mg (125 mg Intravenous Given 9/29/23 3766)     Medical Decision Making  Patient received Xopenex Atrovent x3 Solu-Medrol in the emergency oxygen saturation improved from 89% to 96% with nasal cannula Laboratory evaluation reviewed patient's chest x-ray shows no evidence of infiltrates I have discussed patient's findings with Ms. Thang DOWLING who will evaluate patient in the emergency department for admission    Amount and/or Complexity of Data Reviewed  Labs: ordered. Decision-making details documented in ED Course.  Radiology: ordered. Decision-making details documented in ED Course.  ECG/medicine tests: ordered. Decision-making details documented in ED Course.    Risk  Prescription drug management.  Decision regarding hospitalization.                               Clinical Impression:   Final diagnoses:  [R06.02] Shortness of breath  [J44.1] COPD exacerbation (Primary)  [R09.02] Hypoxia        ED Disposition Condition    Admit Stable                Hermann Concepcion MD  09/29/23 7191

## 2023-09-30 NOTE — NURSING
Nurses Note -- 4 Eyes      9/30/2023   2:47 AM      Skin assessed during: Admit      [x] No Altered Skin Integrity Present    []Prevention Measures Documented      [] Yes- Altered Skin Integrity Present or Discovered   [] LDA Added if Not in Epic (Describe Wound)   [] New Altered Skin Integrity was Present on Admit and Documented in LDA   [] Wound Image Taken    Wound Care Consulted? No    Attending Nurse:  Flor Shipman RN/Staff Member:   JOSE Heredia

## 2023-09-30 NOTE — HPI
"64 y/o male with history of CAD s/p CABG, CHF, HTN, HLD, who smokes 3/4 of a pack of cigarettes a day presents with increasing SOB for the last 3 days. Patient states that he started feeling SOB, coughing, wheezing, and feeling fatigued about 3 days ago while he was in [place]. He states that he was coughing up some white sputum at the time and started to use his albuterol inhaler however he continued to cough and feel short of breath. Patient flew home today and was having to stop and rest multiple times in the airport. He states when he gets SOB he also feels dizzy and his vision gets "a bit blurry". He states that he has felt like he has been running fever and has had chills but never took his temperature. Patient denies any abdominal pain, nausea, vomiting, or sick contacts. Patient was seen at an urgent care where his O2 saturation was low so he was sent into the ED. Code status was discussed and the patient was placed as a full code at this time.     ED: Vitals showed , /106, RR 20 saturating 89% on RA. After being placed on 2 L via nasal cannula the patients vitals were , /89, RR 20 saturating 98%. Labs were significant for negative COVID and influenza tests, CO2 32, and Troponin 7.6. CXR showed emphysematous changes with no acute cardiopulmonary changes.   "

## 2023-09-30 NOTE — PLAN OF CARE
09/30/23 1603   FAGAN Message   Medicare Outpatient and Observation Notification regarding financial responsibility Given to patient/caregiver;Explained to patient/caregiver;Signed/date by patient/caregiver   Date FAGAN was signed 09/30/23   Time FAGAN was signed 1111

## 2023-09-30 NOTE — H&P
"[facility] - Emergency Dept  Hospital Medicine  History & Physical    Patient Name: Anjum Campbell  MRN: [id number]  Patient Class: OP- Observation  Admission Date: 9/29/2023  Attending Physician: Dr. Gaffney  Primary Care Provider: Enmanuel Doe MD         Patient information was obtained from patient and ER records.     Subjective:     Principal Problem:SOB (shortness of breath)    Chief Complaint:   Chief Complaint   Patient presents with    Shortness of Breath     Sent from urgent care for low pulse ox    Cough        HPI: 62 y/o male with history of CAD s/p CABG, CHF, HTN, HLD, who smokes 3/4 of a pack of cigarettes a day presents with increasing SOB for the last 3 days. Patient states that he started feeling SOB, coughing, wheezing, and feeling fatigued about 3 days ago while he was in [place]. He states that he was coughing up some white sputum at the time and started to use his albuterol inhaler however he continued to cough and feel short of breath. Patient flew home today and was having to stop and rest multiple times in the airport. He states when he gets SOB he also feels dizzy and his vision gets "a bit blurry". He states that he has felt like he has been running fever and has had chills but never took his temperature. Patient denies any abdominal pain, nausea, vomiting, or sick contacts. Patient was seen at an urgent care where his O2 saturation was low so he was sent into the ED. Code status was discussed and the patient was placed as a full code at this time.     ED: Vitals showed , /106, RR 20 saturating 89% on RA. After being placed on 2 L via nasal cannula the patients vitals were , /89, RR 20 saturating 98%. Labs were significant for negative COVID and influenza tests, CO2 32, and Troponin 7.6. CXR showed emphysematous changes with no acute cardiopulmonary changes.       Past Medical History:   Diagnosis Date    Back injury 1994    Chest pain     " CHF (congestive heart failure)     Hypertension     Thymoma 2018       Past Surgical History:   Procedure Laterality Date    back fusion   1995    lumbar spine    CATHETERIZATION OF BOTH LEFT AND RIGHT HEART Bilateral 6/28/2018    Procedure: Heart Cath-Bilateral;  Surgeon: Gerardo Trevizo MD;  Location: [facility] CATH LAB;  Service: Cardiology;  Laterality: Bilateral;  RN PREOP 6/27/2018    CORONARY ARTERY BYPASS GRAFT (CABG) N/A 8/13/2018    Procedure: CABGx1;  Surgeon: Orville Payne MD;  Location: [address];  Service: Cardiovascular;  Laterality: N/A;    MEDIASTINAL MASS EXCISION  8/13/2018    Procedure: EXCISION, MASS, MEDIASTINUM;  Surgeon: Orville Payne MD;  Location: [facility] OR [address] [place] [address];  Service: Cardiovascular;;       Review of patient's allergies indicates:  No Known Allergies    No current facility-administered medications on file prior to encounter.     Current Outpatient Medications on File Prior to Encounter   Medication Sig    albuterol (PROVENTIL/VENTOLIN HFA) 90 mcg/actuation inhaler Inhale 2 puffs into the lungs every 6 (six) hours as needed for Wheezing. Rescue    amLODIPine (NORVASC) 5 MG tablet Take 5 mg by mouth.    aspirin (ECOTRIN) 325 MG EC tablet Take 325 mg by mouth once daily.    atorvastatin (LIPITOR) 40 MG tablet Take 40 mg by mouth.    atorvastatin (LIPITOR) 20 MG tablet Take 20 mg by mouth once daily.    azelastine (ASTELIN) 137 mcg (0.1 %) nasal spray 1 spray (137 mcg total) by Nasal route 2 (two) times daily. for 7 days    carvedilol (COREG) 12.5 MG tablet Take 2 tablets (25 mg total) by mouth 2 (two) times daily with meals.    pulse oximeter (PULSE OXIMETER) device by Apply Externally route 2 (two) times a day. Use twice daily at 8 AM and 3 PM and record the value in [employer] as directed.     Family History       Problem Relation (Age of Onset)    Lung cancer Father          Tobacco Use    Smoking status: Every Day     Current packs/day: 0.25     Average  packs/day: 0.3 packs/day for 30.0 years (7.5 ttl pk-yrs)     Types: Cigarettes    Smokeless tobacco: Never    Tobacco comments:     9/27/18 ---only a few cigs per day, trying to quit   Substance and Sexual Activity    Alcohol use: Yes     Comment: occaSIONAL    Drug use: No    Sexual activity: Yes     Review of Systems   Constitutional:  Positive for chills, fatigue and fever (subjective). Negative for activity change, appetite change and diaphoresis.   HENT:  Negative for congestion, ear discharge, ear pain, postnasal drip, rhinorrhea, sinus pressure and sore throat.    Eyes:  Positive for visual disturbance (blurry vision with SOB). Negative for pain, discharge, redness and itching.   Respiratory:  Positive for cough, shortness of breath and wheezing. Negative for chest tightness.    Cardiovascular:  Negative for chest pain and leg swelling.   Gastrointestinal:  Negative for abdominal pain, constipation, diarrhea, nausea and vomiting.   Genitourinary:  Negative for dysuria, frequency, hematuria and urgency.   Musculoskeletal:  Negative for back pain.   Skin:  Negative for color change, pallor and rash.   Neurological:  Positive for light-headedness. Negative for dizziness, syncope, facial asymmetry and weakness.   Psychiatric/Behavioral:  Negative for behavioral problems, confusion and hallucinations.      Objective:     Vital Signs (Most Recent):  Temp: 97.8 °F (36.6 °C) (09/29/23 1836)  Pulse: 102 (09/30/23 0027)  Resp: (!) 22 (09/30/23 0027)  BP: (!) 149/93 (09/29/23 2200)  SpO2: 96 % (09/30/23 0027) Vital Signs (24h Range):  Temp:  [97.8 °F (36.6 °C)] 97.8 °F (36.6 °C)  Pulse:  [101-112] 102  Resp:  [18-27] 22  SpO2:  [89 %-98 %] 96 %  BP: (149-198)/() 149/93     Weight: 75.8 kg (167 lb)  Body mass index is 24.66 kg/m².     Physical Exam  Vitals and nursing note reviewed.   Constitutional:       General: He is not in acute distress.     Appearance: Normal appearance. He is not ill-appearing,  toxic-appearing or diaphoretic.   HENT:      Head: Normocephalic and atraumatic.      Nose: Nose normal.      Mouth/Throat:      Mouth: Mucous membranes are moist.   Eyes:      Extraocular Movements: Extraocular movements intact.   Cardiovascular:      Rate and Rhythm: Normal rate and regular rhythm.      Heart sounds: Normal heart sounds. No murmur heard.  Pulmonary:      Effort: Respiratory distress present.      Breath sounds: Wheezing and rhonchi present.      Comments: Tachypnea  Poor air movement diffusely  Abdominal:      General: Bowel sounds are normal.      Palpations: Abdomen is soft. There is no mass.      Tenderness: There is no abdominal tenderness. There is no guarding.      Hernia: No hernia is present.   Musculoskeletal:         General: No swelling, tenderness or deformity. Normal range of motion.      Cervical back: Normal range of motion. No rigidity or tenderness.   Skin:     General: Skin is warm and dry.      Coloration: Skin is not jaundiced.      Findings: No bruising or erythema.   Neurological:      General: No focal deficit present.      Mental Status: He is alert and oriented to person, place, and time. Mental status is at baseline.   Psychiatric:         Mood and Affect: Mood is anxious.         Behavior: Behavior normal.                Significant Labs: All pertinent labs within the past 24 hours have been reviewed.  CBC:   Recent Labs   Lab 09/29/23 1926   WBC 12.67   HGB 16.3   HCT 50.0        CMP:   Recent Labs   Lab 09/29/23 1926      K 4.0      CO2 32*      BUN 14   CREATININE 1.0   CALCIUM 9.6   PROT 7.8   ALBUMIN 4.6   BILITOT 0.5   ALKPHOS 78   AST 16   ALT 14   ANIONGAP 5*     Cardiac Markers:   Recent Labs   Lab 09/29/23 1926   BNP 39     Coagulation:   Recent Labs   Lab 09/29/23 1926   INR 1.0     Troponin:   Recent Labs   Lab 09/29/23 2106   TROPONINIHS 7.6     Urine Studies:   Recent Labs   Lab 09/29/23 2229   COLORU Yellow   APPEARANCEUA  Clear   PHUR 6.0   SPECGRAV >1.030*   PROTEINUA Trace*   GLUCUA Negative   KETONESU Negative   BILIRUBINUA Negative   OCCULTUA 2+*   NITRITE Negative   UROBILINOGEN 2.0-3.0*   LEUKOCYTESUR Negative   RBCUA 10*   WBCUA 3   BACTERIA Negative   SQUAMEPITHEL 1   HYALINECASTS 2*       Significant Imaging: I have reviewed all pertinent imaging results/findings within the past 24 hours.    Assessment/Plan:     * SOB (shortness of breath)  Patient came into the ED for increased SOB, coughing, fatigue, and wheezing with subjective fever/chills for the last 3 days  Patient states that he has never been diagnosed with COPD or seen a pulmonologist however this is the second time he has had to come into the hospital with similar symptoms     O2 saturation on arrival- 89% on RA which improved to the mid 90s on 2 L    Started on Nebulizer treatments in the ED  - Nebulizer treatments ordered q4h     Given Methylprednisolone 125 in the ED  - Ordered Methylprednisolone 40 q6h    Levofloxacin ordered   Oxygen ordered PRN to keep O2 saturation 90% or higher   Continue to monitor vitals          Benign essential HTN  Continue home regimen as tolerated  Continue to monitor vitals       Hyperlipidemia  Continue home statin      Tobacco abuse  I talked to the patient about the importance of smoking cessation   Ordered Nicotine patches daily while inpatient         VTE Risk Mitigation (From admission, onward)         Ordered     enoxaparin injection 40 mg  Daily         09/30/23 0022     IP VTE HIGH RISK PATIENT  Once         09/30/23 0022     Place sequential compression device  Until discontinued         09/30/23 0022                     On 09/30/2023, patient should be placed in hospital observation services under my care in collaboration with Dr. Gaffney.      Bri Desir PA-C  Department of Hospital Medicine  [facility] - Emergency Dept

## 2023-09-30 NOTE — ASSESSMENT & PLAN NOTE
Patient's COPD is with exacerbation noted by continued dyspnea and worsening of baseline hypoxia currently.  Patient is currently off COPD Pathway. Continue scheduled inhalers Steroids, Antibiotics and Supplemental oxygen and monitor respiratory status closely.

## 2023-09-30 NOTE — PROGRESS NOTES
"[facility] Medicine  Progress Note    Patient Name: Anjum Campbell  MRN: [id number]  Patient Class: IP- Inpatient   Admission Date: 9/29/2023  Length of Stay: 0 days  Attending Physician: Donal Allen MD  Primary Care Provider: Enmanuel Doe MD        Subjective:     Principal Problem:COPD exacerbation        HPI:  62 y/o male with history of CAD s/p CABG, CHF, HTN, HLD, who smokes 3/4 of a pack of cigarettes a day presents with increasing SOB for the last 3 days. Patient states that he started feeling SOB, coughing, wheezing, and feeling fatigued about 3 days ago while he was in [place]. He states that he was coughing up some white sputum at the time and started to use his albuterol inhaler however he continued to cough and feel short of breath. Patient flew home today and was having to stop and rest multiple times in the airport. He states when he gets SOB he also feels dizzy and his vision gets "a bit blurry". He states that he has felt like he has been running fever and has had chills but never took his temperature. Patient denies any abdominal pain, nausea, vomiting, or sick contacts. Patient was seen at an urgent care where his O2 saturation was low so he was sent into the ED. Code status was discussed and the patient was placed as a full code at this time.     ED: Vitals showed , /106, RR 20 saturating 89% on RA. After being placed on 2 L via nasal cannula the patients vitals were , /89, RR 20 saturating 98%. Labs were significant for negative COVID and influenza tests, CO2 32, and Troponin 7.6. CXR showed emphysematous changes with no acute cardiopulmonary changes.       Overview/Hospital Course:  No notes on file    Interval History:  Notes reviewed, no acute events overnight.  Patient is sitting up in bed receiving neb treatment.  He states he feels better today.  He is still experiencing significant dyspnea on exertion and wheezing on exam.  " Advised patient will continue with current treatment plan and monitor overnight.  If remains stable tomorrow will do home O2 eval and possibly discharge with oxygen.  Patient verbalized understanding and appreciative of care.    Review of Systems   Constitutional:  Positive for activity change. Negative for chills, fatigue and fever.   HENT:  Negative for congestion, sore throat and trouble swallowing.    Respiratory:  Positive for shortness of breath and wheezing. Negative for cough.    Cardiovascular:  Negative for chest pain, palpitations and leg swelling.   Gastrointestinal:  Negative for abdominal pain, diarrhea, nausea and vomiting.   Genitourinary:  Negative for difficulty urinating, dysuria and urgency.   Musculoskeletal:  Negative for arthralgias, back pain and gait problem.   Skin:  Negative for color change, pallor, rash and wound.   Psychiatric/Behavioral:  Negative for agitation, behavioral problems and confusion.    All other systems reviewed and are negative.    Objective:     Vital Signs (Most Recent):  Temp: 97.7 °F (36.5 °C) (09/30/23 1119)  Pulse: 88 (09/30/23 1031)  Resp: 18 (09/30/23 1119)  BP: 128/78 (09/30/23 1119)  SpO2: (!) 93 % (09/30/23 1119) Vital Signs (24h Range):  Temp:  [97.7 °F (36.5 °C)-98.3 °F (36.8 °C)] 97.7 °F (36.5 °C)  Pulse:  [] 88  Resp:  [16-27] 18  SpO2:  [89 %-98 %] 93 %  BP: (128-198)/() 128/78     Weight: 74.8 kg (164 lb 12.8 oz)  Body mass index is 24.34 kg/m².    Intake/Output Summary (Last 24 hours) at 9/30/2023 1208  Last data filed at 9/30/2023 0547  Gross per 24 hour   Intake 0 ml   Output 350 ml   Net -350 ml         Physical Exam  Vitals and nursing note reviewed.   Constitutional:       General: He is not in acute distress.     Appearance: Normal appearance. He is well-developed. He is not ill-appearing or diaphoretic.   HENT:      Head: Normocephalic and atraumatic.      Right Ear: External ear normal.      Left Ear: External ear normal.      Nose:  Nose normal. No congestion or rhinorrhea.      Mouth/Throat:      Mouth: Mucous membranes are moist.      Pharynx: Oropharynx is clear. No oropharyngeal exudate or posterior oropharyngeal erythema.   Eyes:      General: No scleral icterus.     Conjunctiva/sclera: Conjunctivae normal.      Pupils: Pupils are equal, round, and reactive to light.   Neck:      Vascular: No JVD.   Cardiovascular:      Rate and Rhythm: Normal rate and regular rhythm.      Pulses: Normal pulses.      Heart sounds: Normal heart sounds. No murmur heard.  Pulmonary:      Effort: No respiratory distress.      Breath sounds: No stridor. Wheezing present. No rhonchi or rales.      Comments: Mild tachypnea when speaking.  Diminished breath sounds throughout with exp wheezing to right lobe  Abdominal:      General: Bowel sounds are normal. There is no distension.      Palpations: Abdomen is soft.      Tenderness: There is no abdominal tenderness.   Musculoskeletal:         General: No swelling or tenderness. Normal range of motion.      Cervical back: Normal range of motion and neck supple.   Skin:     General: Skin is warm and dry.      Capillary Refill: Capillary refill takes 2 to 3 seconds.      Coloration: Skin is not jaundiced or pale.      Findings: No erythema.   Neurological:      General: No focal deficit present.      Mental Status: He is alert and oriented to person, place, and time.      Cranial Nerves: No cranial nerve deficit.      Sensory: No sensory deficit.   Psychiatric:         Mood and Affect: Mood normal.         Behavior: Behavior normal.         Thought Content: Thought content normal.             Significant Labs: All pertinent labs within the past 24 hours have been reviewed.  CBC:   Recent Labs   Lab 09/29/23 1926 09/30/23  0451   WBC 12.67 10.08   HGB 16.3 15.9   HCT 50.0 50.3    188     CMP:   Recent Labs   Lab 09/29/23 1926 09/30/23  0451    138   K 4.0 4.1    102   CO2 32* 31*    188*   BUN  14 15   CREATININE 1.0 0.9   CALCIUM 9.6 9.3   PROT 7.8 7.5   ALBUMIN 4.6 4.4   BILITOT 0.5 0.4   ALKPHOS 78 75   AST 16 14   ALT 14 13   ANIONGAP 5* 5*       Significant Imaging: I have reviewed all pertinent imaging results/findings within the past 24 hours.      Assessment/Plan:      * COPD exacerbation  Patient's COPD is with exacerbation noted by continued dyspnea and worsening of baseline hypoxia currently.  Patient is currently off COPD Pathway. Continue scheduled inhalers Steroids, Antibiotics and Supplemental oxygen and monitor respiratory status closely.             Benign essential HTN  Chronic, controlled.  Latest blood pressure and vitals reviewed-     Temp:  [97.7 °F (36.5 °C)-98.3 °F (36.8 °C)]   Pulse:  []   Resp:  [16-27]   BP: (128-198)/()   SpO2:  [89 %-98 %] .   Home meds for hypertension were reviewed and noted below-  Hypertension Medications             amLODIPine (NORVASC) 5 MG tablet Take 5 mg by mouth.    carvedilol (COREG) 12.5 MG tablet Take 2 tablets (25 mg total) by mouth 2 (two) times daily with meals.          While in the hospital, will manage blood pressure as follows; Continue home antihypertensive regimen    Will utilize p.r.n. blood pressure medication only if patient's blood pressure greater than 180/110 and he develops symptoms such as worsening chest pain or shortness of breath.      Hyperlipidemia  Continue home statin      Tobacco abuse  Assistance with smoking cessation was offered, including:  [x]  Medications  [x]  Counseling  []  Printed Information on Smoking Cessation  []  Referral to a Smoking Cessation Program    Patient was counseled regarding smoking for >10 minutes.            VTE Risk Mitigation (From admission, onward)         Ordered     enoxaparin injection 40 mg  Daily         09/30/23 0022     IP VTE HIGH RISK PATIENT  Once         09/30/23 0022     Place sequential compression device  Until discontinued         09/30/23 0022                 Discharge Planning   CONNIE:      Code Status: Full Code   Is the patient medically ready for discharge?:     Reason for patient still in hospital (select all that apply): Patient trending condition and Treatment                     Zakia Wood NP  Department of Hospital Medicine   [facility]

## 2023-09-30 NOTE — FIRST PROVIDER EVALUATION
"Medical screening examination initiated.  I have conducted a focused provider triage encounter, findings are as follows:    Brief history of present illness:  Sent in from urgent care with shortness of breath and high blood pressure    Vitals:    09/29/23 1836   BP: (!) 198/106   BP Location: Left arm   Patient Position: Sitting   Pulse: (!) 112   Resp: 20   Temp: 97.8 °F (36.6 °C)   TempSrc: Oral   SpO2: (!) 89%   Weight: 75.8 kg (167 lb)   Height: 5' 9" (1.753 m)       Pertinent physical exam:  Hypertension and tachycardic low oxygen saturation    Brief workup plan:  Shortness of breath work    Preliminary workup initiated; this workup will be continued and followed by the physician or advanced practice provider that is assigned to the patient when roomed.  "

## 2023-09-30 NOTE — SUBJECTIVE & OBJECTIVE
Interval History:  Notes reviewed, no acute events overnight.  Patient is sitting up in bed receiving neb treatment.  He states he feels better today.  He is still experiencing significant dyspnea on exertion and wheezing on exam.  Advised patient will continue with current treatment plan and monitor overnight.  If remains stable tomorrow will do home O2 eval and possibly discharge with oxygen.  Patient verbalized understanding and appreciative of care.    Review of Systems   Constitutional:  Positive for activity change. Negative for chills, fatigue and fever.   HENT:  Negative for congestion, sore throat and trouble swallowing.    Respiratory:  Positive for shortness of breath and wheezing. Negative for cough.    Cardiovascular:  Negative for chest pain, palpitations and leg swelling.   Gastrointestinal:  Negative for abdominal pain, diarrhea, nausea and vomiting.   Genitourinary:  Negative for difficulty urinating, dysuria and urgency.   Musculoskeletal:  Negative for arthralgias, back pain and gait problem.   Skin:  Negative for color change, pallor, rash and wound.   Psychiatric/Behavioral:  Negative for agitation, behavioral problems and confusion.    All other systems reviewed and are negative.    Objective:     Vital Signs (Most Recent):  Temp: 97.7 °F (36.5 °C) (09/30/23 1119)  Pulse: 88 (09/30/23 1031)  Resp: 18 (09/30/23 1119)  BP: 128/78 (09/30/23 1119)  SpO2: (!) 93 % (09/30/23 1119) Vital Signs (24h Range):  Temp:  [97.7 °F (36.5 °C)-98.3 °F (36.8 °C)] 97.7 °F (36.5 °C)  Pulse:  [] 88  Resp:  [16-27] 18  SpO2:  [89 %-98 %] 93 %  BP: (128-198)/() 128/78     Weight: 74.8 kg (164 lb 12.8 oz)  Body mass index is 24.34 kg/m².    Intake/Output Summary (Last 24 hours) at 9/30/2023 1208  Last data filed at 9/30/2023 0551  Gross per 24 hour   Intake 0 ml   Output 350 ml   Net -350 ml         Physical Exam  Vitals and nursing note reviewed.   Constitutional:       General: He is not in acute  distress.     Appearance: Normal appearance. He is well-developed. He is not ill-appearing or diaphoretic.   HENT:      Head: Normocephalic and atraumatic.      Right Ear: External ear normal.      Left Ear: External ear normal.      Nose: Nose normal. No congestion or rhinorrhea.      Mouth/Throat:      Mouth: Mucous membranes are moist.      Pharynx: Oropharynx is clear. No oropharyngeal exudate or posterior oropharyngeal erythema.   Eyes:      General: No scleral icterus.     Conjunctiva/sclera: Conjunctivae normal.      Pupils: Pupils are equal, round, and reactive to light.   Neck:      Vascular: No JVD.   Cardiovascular:      Rate and Rhythm: Normal rate and regular rhythm.      Pulses: Normal pulses.      Heart sounds: Normal heart sounds. No murmur heard.  Pulmonary:      Effort: No respiratory distress.      Breath sounds: No stridor. Wheezing present. No rhonchi or rales.      Comments: Mild tachypnea when speaking.  Diminished breath sounds throughout with exp wheezing to right lobe  Abdominal:      General: Bowel sounds are normal. There is no distension.      Palpations: Abdomen is soft.      Tenderness: There is no abdominal tenderness.   Musculoskeletal:         General: No swelling or tenderness. Normal range of motion.      Cervical back: Normal range of motion and neck supple.   Skin:     General: Skin is warm and dry.      Capillary Refill: Capillary refill takes 2 to 3 seconds.      Coloration: Skin is not jaundiced or pale.      Findings: No erythema.   Neurological:      General: No focal deficit present.      Mental Status: He is alert and oriented to person, place, and time.      Cranial Nerves: No cranial nerve deficit.      Sensory: No sensory deficit.   Psychiatric:         Mood and Affect: Mood normal.         Behavior: Behavior normal.         Thought Content: Thought content normal.             Significant Labs: All pertinent labs within the past 24 hours have been reviewed.  CBC:    Recent Labs   Lab 09/29/23 1926 09/30/23  0451   WBC 12.67 10.08   HGB 16.3 15.9   HCT 50.0 50.3    188     CMP:   Recent Labs   Lab 09/29/23 1926 09/30/23  0451    138   K 4.0 4.1    102   CO2 32* 31*    188*   BUN 14 15   CREATININE 1.0 0.9   CALCIUM 9.6 9.3   PROT 7.8 7.5   ALBUMIN 4.6 4.4   BILITOT 0.5 0.4   ALKPHOS 78 75   AST 16 14   ALT 14 13   ANIONGAP 5* 5*       Significant Imaging: I have reviewed all pertinent imaging results/findings within the past 24 hours.

## 2023-10-01 VITALS
HEART RATE: 90 BPM | OXYGEN SATURATION: 96 % | WEIGHT: 164.81 LBS | SYSTOLIC BLOOD PRESSURE: 139 MMHG | BODY MASS INDEX: 24.41 KG/M2 | DIASTOLIC BLOOD PRESSURE: 71 MMHG | HEIGHT: 69 IN | RESPIRATION RATE: 20 BRPM | TEMPERATURE: 98 F

## 2023-10-01 DIAGNOSIS — J44.1 COPD EXACERBATION: Primary | ICD-10-CM

## 2023-10-01 LAB
ALBUMIN SERPL BCP-MCNC: 4 G/DL (ref 3.5–5.2)
ALP SERPL-CCNC: 68 U/L (ref 55–135)
ALT SERPL W/O P-5'-P-CCNC: 11 U/L (ref 10–44)
ANION GAP SERPL CALC-SCNC: 4 MMOL/L (ref 8–16)
AST SERPL-CCNC: 12 U/L (ref 10–40)
BASOPHILS # BLD AUTO: 0.04 K/UL (ref 0–0.2)
BASOPHILS NFR BLD: 0.2 % (ref 0–1.9)
BILIRUB SERPL-MCNC: 0.3 MG/DL (ref 0.1–1)
BUN SERPL-MCNC: 21 MG/DL (ref 8–23)
CALCIUM SERPL-MCNC: 9.5 MG/DL (ref 8.7–10.5)
CHLORIDE SERPL-SCNC: 101 MMOL/L (ref 95–110)
CO2 SERPL-SCNC: 32 MMOL/L (ref 23–29)
CREAT SERPL-MCNC: 0.9 MG/DL (ref 0.5–1.4)
DIFFERENTIAL METHOD: ABNORMAL
EOSINOPHIL # BLD AUTO: 0 K/UL (ref 0–0.5)
EOSINOPHIL NFR BLD: 0 % (ref 0–8)
ERYTHROCYTE [DISTWIDTH] IN BLOOD BY AUTOMATED COUNT: 13.4 % (ref 11.5–14.5)
EST. GFR  (NO RACE VARIABLE): >60 ML/MIN/1.73 M^2
GLUCOSE SERPL-MCNC: 148 MG/DL (ref 70–110)
HCT VFR BLD AUTO: 48.4 % (ref 40–54)
HGB BLD-MCNC: 15.1 G/DL (ref 14–18)
IMM GRANULOCYTES # BLD AUTO: 0.26 K/UL (ref 0–0.04)
IMM GRANULOCYTES NFR BLD AUTO: 1.1 % (ref 0–0.5)
LYMPHOCYTES # BLD AUTO: 0.9 K/UL (ref 1–4.8)
LYMPHOCYTES NFR BLD: 4 % (ref 18–48)
MCH RBC QN AUTO: 30 PG (ref 27–31)
MCHC RBC AUTO-ENTMCNC: 31.2 G/DL (ref 32–36)
MCV RBC AUTO: 96 FL (ref 82–98)
MONOCYTES # BLD AUTO: 0.7 K/UL (ref 0.3–1)
MONOCYTES NFR BLD: 3 % (ref 4–15)
NEUTROPHILS # BLD AUTO: 21.3 K/UL (ref 1.8–7.7)
NEUTROPHILS NFR BLD: 91.7 % (ref 38–73)
NRBC BLD-RTO: 0 /100 WBC
PLATELET # BLD AUTO: 209 K/UL (ref 150–450)
PMV BLD AUTO: 10.8 FL (ref 9.2–12.9)
POTASSIUM SERPL-SCNC: 4.7 MMOL/L (ref 3.5–5.1)
PROCALCITONIN SERPL IA-MCNC: 0.05 NG/ML (ref 0–0.5)
PROT SERPL-MCNC: 6.9 G/DL (ref 6–8.4)
RBC # BLD AUTO: 5.04 M/UL (ref 4.6–6.2)
SODIUM SERPL-SCNC: 137 MMOL/L (ref 136–145)
WBC # BLD AUTO: 23.27 K/UL (ref 3.9–12.7)

## 2023-10-01 PROCEDURE — 94799 UNLISTED PULMONARY SVC/PX: CPT

## 2023-10-01 PROCEDURE — 84145 PROCALCITONIN (PCT): CPT | Performed by: NURSE PRACTITIONER

## 2023-10-01 PROCEDURE — 25000003 PHARM REV CODE 250

## 2023-10-01 PROCEDURE — 94761 N-INVAS EAR/PLS OXIMETRY MLT: CPT

## 2023-10-01 PROCEDURE — 36415 COLL VENOUS BLD VENIPUNCTURE: CPT | Performed by: NURSE PRACTITIONER

## 2023-10-01 PROCEDURE — 94640 AIRWAY INHALATION TREATMENT: CPT

## 2023-10-01 PROCEDURE — 36415 COLL VENOUS BLD VENIPUNCTURE: CPT

## 2023-10-01 PROCEDURE — 80053 COMPREHEN METABOLIC PANEL: CPT

## 2023-10-01 PROCEDURE — 27000221 HC OXYGEN, UP TO 24 HOURS

## 2023-10-01 PROCEDURE — 63600175 PHARM REV CODE 636 W HCPCS

## 2023-10-01 PROCEDURE — 99900031 HC PATIENT EDUCATION (STAT)

## 2023-10-01 PROCEDURE — 85025 COMPLETE CBC W/AUTO DIFF WBC: CPT

## 2023-10-01 PROCEDURE — 99900035 HC TECH TIME PER 15 MIN (STAT)

## 2023-10-01 PROCEDURE — 25000003 PHARM REV CODE 250: Performed by: NURSE PRACTITIONER

## 2023-10-01 PROCEDURE — 25000242 PHARM REV CODE 250 ALT 637 W/ HCPCS

## 2023-10-01 RX ORDER — LEVOFLOXACIN 750 MG/1
750 TABLET ORAL DAILY
Qty: 5 TABLET | Refills: 0 | Status: SHIPPED | OUTPATIENT
Start: 2023-10-01 | End: 2023-10-06

## 2023-10-01 RX ORDER — IPRATROPIUM BROMIDE AND ALBUTEROL SULFATE 2.5; .5 MG/3ML; MG/3ML
3 SOLUTION RESPIRATORY (INHALATION) EVERY 4 HOURS PRN
Qty: 75 ML | Refills: 1 | Status: SHIPPED | OUTPATIENT
Start: 2023-10-01 | End: 2024-09-30

## 2023-10-01 RX ORDER — CODEINE PHOSPHATE AND GUAIFENESIN 10; 100 MG/5ML; MG/5ML
5 SOLUTION ORAL EVERY 6 HOURS PRN
Qty: 237 ML | Refills: 0 | Status: SHIPPED | OUTPATIENT
Start: 2023-10-01 | End: 2023-10-13

## 2023-10-01 RX ORDER — PREDNISONE 20 MG/1
TABLET ORAL
Qty: 18 TABLET | Refills: 0 | Status: SHIPPED | OUTPATIENT
Start: 2023-10-01 | End: 2023-10-10

## 2023-10-01 RX ADMIN — ATORVASTATIN CALCIUM 40 MG: 40 TABLET, FILM COATED ORAL at 09:10

## 2023-10-01 RX ADMIN — LEVALBUTEROL HYDROCHLORIDE 1.25 MG: 1.25 SOLUTION RESPIRATORY (INHALATION) at 03:10

## 2023-10-01 RX ADMIN — IPRATROPIUM BROMIDE 0.5 MG: 0.5 SOLUTION RESPIRATORY (INHALATION) at 07:10

## 2023-10-01 RX ADMIN — LEVALBUTEROL HYDROCHLORIDE 1.25 MG: 1.25 SOLUTION RESPIRATORY (INHALATION) at 10:10

## 2023-10-01 RX ADMIN — GUAIFENESIN AND DEXTROMETHORPHAN HYDROBROMIDE 1 TABLET: 600; 30 TABLET, EXTENDED RELEASE ORAL at 09:10

## 2023-10-01 RX ADMIN — ASPIRIN 325 MG: 325 TABLET, COATED ORAL at 09:10

## 2023-10-01 RX ADMIN — METHYLPREDNISOLONE SODIUM SUCCINATE 40 MG: 40 INJECTION, POWDER, FOR SOLUTION INTRAMUSCULAR; INTRAVENOUS at 06:10

## 2023-10-01 RX ADMIN — AMLODIPINE BESYLATE 5 MG: 5 TABLET ORAL at 09:10

## 2023-10-01 RX ADMIN — IPRATROPIUM BROMIDE 0.5 MG: 0.5 SOLUTION RESPIRATORY (INHALATION) at 03:10

## 2023-10-01 RX ADMIN — LEVALBUTEROL HYDROCHLORIDE 1.25 MG: 1.25 SOLUTION RESPIRATORY (INHALATION) at 07:10

## 2023-10-01 RX ADMIN — LEVOFLOXACIN 750 MG: 5 INJECTION, SOLUTION INTRAVENOUS at 01:10

## 2023-10-01 RX ADMIN — CARVEDILOL 12.5 MG: 12.5 TABLET, FILM COATED ORAL at 09:10

## 2023-10-01 RX ADMIN — IPRATROPIUM BROMIDE 0.5 MG: 0.5 SOLUTION RESPIRATORY (INHALATION) at 10:10

## 2023-10-01 NOTE — PLAN OF CARE
Patient's spouse stated the pharmacy that they use did not have the nebulizer and supplies.  She requested that Zakia Nails NP send electronic prescription to [facility] pharmacy and her sister will  tomorrow.  Spouse stated that they did borrow one from family to use until they obtain one.  Informed Zakia Nails NP.

## 2023-10-01 NOTE — CARE UPDATE
09/30/23 2000   Patient Assessment/Suction   Level of Consciousness (AVPU) alert   Respiratory Effort Normal;Unlabored   Expansion/Accessory Muscles/Retractions no use of accessory muscles   All Lung Fields Breath Sounds equal bilaterally;diminished   Rhythm/Pattern, Respiratory unlabored   Cough Frequency no cough   PRE-TX-O2   Device (Oxygen Therapy) nasal cannula   Flow (L/min) 2   SpO2 (!) 92 %   Pulse Oximetry Type Intermittent   $ Pulse Oximetry - Multiple Charge Pulse Oximetry - Multiple   Pulse 101   Resp 20   Aerosol Therapy   $ Aerosol Therapy Charges Aerosol Treatment   Daily Review of Necessity (SVN) completed   Respiratory Treatment Status (SVN) given   Treatment Route (SVN) mask   Patient Position (SVN) HOB elevated   Post Treatment Assessment (SVN) breath sounds unchanged   Signs of Intolerance (SVN) none   Breath Sounds Post-Respiratory Treatment   Throughout All Fields Post-Treatment All Fields   Throughout All Fields Post-Treatment no change   Post-treatment Heart Rate (beats/min) 99   Post-treatment Resp Rate (breaths/min) 18   Education   $ Education Bronchodilator;15 min   Respiratory Evaluation   $ Care Plan Tech Time 15 min   $ Eval/Re-eval Charges Evaluation   Evaluation For New Orders

## 2023-10-01 NOTE — PLAN OF CARE
Sent home oxygen referral to [facility] and also spoke to Zakia.  ETA for delivery of tanks to hospital is 1-2 hrs.

## 2023-10-01 NOTE — CARE UPDATE
Continue therapy    10/01/23 0701   Patient Assessment/Suction   Level of Consciousness (AVPU) alert   Respiratory Effort Normal;Unlabored   Expansion/Accessory Muscles/Retractions no use of accessory muscles;expansion symmetric   All Lung Fields Breath Sounds equal bilaterally;diminished   RICARDO Breath Sounds diminished   LLL Breath Sounds diminished   RUL Breath Sounds diminished   RML Breath Sounds diminished   RLL Breath Sounds diminished   Rhythm/Pattern, Respiratory unlabored;depth regular;no shortness of breath reported   Cough Frequency no cough   PRE-TX-O2   Device (Oxygen Therapy) nasal cannula   $ Is the patient on Low Flow Oxygen? Yes   Flow (L/min) 2   SpO2 (!) 93 %   Pulse Oximetry Type Intermittent   $ Pulse Oximetry - Multiple Charge Pulse Oximetry - Multiple   Pulse 88   Resp 18   Aerosol Therapy   $ Aerosol Therapy Charges Aerosol Treatment   Daily Review of Necessity (SVN) completed   Respiratory Treatment Status (SVN) given   Treatment Route (SVN) mask;oxygen   Patient Position (SVN) HOB elevated   Post Treatment Assessment (SVN) breath sounds unchanged   Signs of Intolerance (SVN) none   Breath Sounds Post-Respiratory Treatment   Throughout All Fields Post-Treatment All Fields   Throughout All Fields Post-Treatment no change   Post-treatment Heart Rate (beats/min) 90   Post-treatment Resp Rate (breaths/min) 18   Education   $ Education Bronchodilator;15 min   Respiratory Evaluation   $ Care Plan Tech Time 15 min   $ Eval/Re-eval Charges Re-evaluation

## 2023-10-01 NOTE — DISCHARGE SUMMARY
"[facility]  Discharge Summary      Patient Name: Anjum Campbell  MRN: [id number]  KATIE: [id number]  Patient Class: IP- Inpatient  Admission Date: 9/29/2023  Hospital Length of Stay: 1 days  Discharge Date and Time: 10/1/2023  1:23 PM  Attending Physician: No att. providers found   Discharging Provider: Zakia Wood NP  Primary Care Provider: Enmanuel Doe MD    Primary Care Team: Networked reference to record PCT     HPI:   64 y/o male with history of CAD s/p CABG, CHF, HTN, HLD, who smokes 3/4 of a pack of cigarettes a day presents with increasing SOB for the last 3 days. Patient states that he started feeling SOB, coughing, wheezing, and feeling fatigued about 3 days ago while he was in [place]. He states that he was coughing up some white sputum at the time and started to use his albuterol inhaler however he continued to cough and feel short of breath. Patient flew home today and was having to stop and rest multiple times in the airport. He states when he gets SOB he also feels dizzy and his vision gets "a bit blurry". He states that he has felt like he has been running fever and has had chills but never took his temperature. Patient denies any abdominal pain, nausea, vomiting, or sick contacts. Patient was seen at an urgent care where his O2 saturation was low so he was sent into the ED. Code status was discussed and the patient was placed as a full code at this time.     ED: Vitals showed , /106, RR 20 saturating 89% on RA. After being placed on 2 L via nasal cannula the patients vitals were , /89, RR 20 saturating 98%. Labs were significant for negative COVID and influenza tests, CO2 32, and Troponin 7.6. CXR showed emphysematous changes with no acute cardiopulmonary changes.       * No surgery found *      Hospital Course:   Patient was monitored closely during his hospital stay.  He was initiated on IV steroids, IV Levaquin, neb treatments " and supplemental oxygen.  Patient's symptoms improved with treatment.  He remained stable on 2 L nasal cannula.  He was evaluated for home oxygen and qualified and case management assisted with arranging on discharge.  Patient was educated on importance of smoking cessation.  Patient felt well and desired to go home.  He was cleared for discharge and verbalized understanding of discharge instructions and return precautions.  Outpatient appointments were arranged with pulmonology and PCP for close outpatient follow-up.    Physical exam:  Awake alert oriented x3, pleasant, no acute distress   Heart-regular rate rhythm, no edema   Lungs-slightly diminished with very faint expiratory wheeze to lower lobes, respirations even unlabored, stable on 2 L nasal cannula  Abdomen-soft nontender normoactive bowel sounds           Goals of Care Treatment Preferences:  Code Status: Full Code      Consults:     No new Assessment & Plan notes have been filed under this hospital service since the last note was generated.  Service: Hospital Medicine    Final Active Diagnoses:    Diagnosis Date Noted POA    PRINCIPAL PROBLEM:  COPD exacerbation [J44.1] 09/29/2023 Yes    Hyperlipidemia [E78.5] 06/21/2018 Yes    Benign essential HTN [I10] 06/21/2018 Yes    Tobacco abuse [Z72.0] 06/18/2018 Yes      Problems Resolved During this Admission:       Discharged Condition: good    Disposition: Home or Self Care    Follow Up:   Follow-up Information     Jewel Chirinos III, MD Follow up in 3 day(s).    Specialty: Family Medicine  Why: hospital follow up, to recheck your symptoms  Contact information:  [address] [place] [address]  [id number]  [facility] [id number]  [phone]             Benito Carr MD Follow up in 3 day(s).    Specialty: Pulmonary Disease  Why: hospital follow up, to recheck your symptoms  Contact information:  [address] [place] [address]  [id number]  [place] [address]  [phone]                       Patient  "Instructions:      OXYGEN FOR HOME USE     Order Specific Question Answer Comments   Liter Flow 2    Duration Continuous    Qualifying Test Performed at: Activity    Oxygen saturation at rest 90    Oxygen saturation with activity 87    Oxygen saturation with activity on oxygen 92    Portable mode: continuous    Route nasal cannula    Device: home concentrator with portable tanks    Length of need (in months): 99 mos    Patient condition with qualifying saturation COPD    Height: 5' 9" (1.753 m)    Weight: 74.8 kg (164 lb 12.8 oz)    Alternative treatment measures have been tried or considered and deemed clinically ineffective. Yes      NEBULIZER FOR HOME USE     Order Specific Question Answer Comments   Height: 5' 9" (1.753 m)    Weight: 74.8 kg (164 lb 12.8 oz)    Does patient have medical equipment at home? none    Length of need (1-99 months): 99      NEBULIZER KIT (SUPPLIES) FOR HOME USE     Order Specific Question Answer Comments   Height: 5' 9" (1.753 m)    Weight: 74.8 kg (164 lb 12.8 oz)    Does patient have medical equipment at home? none    Length of need (1-99 months): 99    Mask or Mouthpiece? Mouthpiece      Ambulatory referral/consult to Smoking Cessation Program   Standing Status: Future   Referral Priority: Routine Referral Type: Consultation   Referral Reason: Specialty Services Required   Requested Specialty: CTTS   Number of Visits Requested: 1     Diet Cardiac     Notify your health care provider if you experience any of the following:  temperature >100.4     Notify your health care provider if you experience any of the following:  persistent nausea and vomiting or diarrhea     Notify your health care provider if you experience any of the following:  difficulty breathing or increased cough     Notify your health care provider if you experience any of the following:  persistent dizziness, light-headedness, or visual disturbances     Notify your health care provider if you experience any of the " following:  increased confusion or weakness     Reason for not Prescribing Nicotine Replacement     Order Specific Question Answer Comments   Reason for not Prescribing: Patient refused      Activity as tolerated       Significant Diagnostic Studies: Labs:   CMP   Recent Labs   Lab 09/29/23  1926 09/30/23  0451 10/01/23  0314    138 137   K 4.0 4.1 4.7    102 101   CO2 32* 31* 32*    188* 148*   BUN 14 15 21   CREATININE 1.0 0.9 0.9   CALCIUM 9.6 9.3 9.5   PROT 7.8 7.5 6.9   ALBUMIN 4.6 4.4 4.0   BILITOT 0.5 0.4 0.3   ALKPHOS 78 75 68   AST 16 14 12   ALT 14 13 11   ANIONGAP 5* 5* 4*   , CBC   Recent Labs   Lab 09/29/23  1926 09/30/23  0451 10/01/23  0315   WBC 12.67 10.08 23.27*   HGB 16.3 15.9 15.1   HCT 50.0 50.3 48.4    188 209    and All labs within the past 24 hours have been reviewed    Pending Diagnostic Studies:     None         Medications:  Reconciled Home Medications:      Medication List      START taking these medications    albuterol-ipratropium 2.5 mg-0.5 mg/3 mL nebulizer solution  Commonly known as: DUO-NEB  Take 3 mLs by nebulization every 4 (four) hours as needed for Wheezing or Shortness of Breath. Rescue     dextromethorphan-guaiFENesin  mg  mg per 12 hr tablet  Commonly known as: MUCINEX DM  Take 1 tablet by mouth 2 (two) times daily. for 10 days     guaiFENesin-codeine 100-10 mg/5 ml  mg/5 mL syrup  Commonly known as: TUSSI-ORGANIDIN NR  Take 5 mLs by mouth every 6 (six) hours as needed for Cough.     levoFLOXacin 750 MG tablet  Commonly known as: LEVAQUIN  Take 1 tablet (750 mg total) by mouth once daily. for 5 days     predniSONE 20 MG tablet  Commonly known as: DELTASONE  Take 3 tablets (60 mg total) by mouth once daily for 3 days, THEN 2 tablets (40 mg total) once daily for 3 days, THEN 1 tablet (20 mg total) once daily for 3 days.  Start taking on: October 1, 2023        CHANGE how you take these medications    atorvastatin 40 MG  tablet  Commonly known as: LIPITOR  Take 40 mg by mouth.  What changed: Another medication with the same name was removed. Continue taking this medication, and follow the directions you see here.        CONTINUE taking these medications    albuterol 90 mcg/actuation inhaler  Commonly known as: PROVENTIL/VENTOLIN HFA  Inhale 2 puffs into the lungs every 6 (six) hours as needed for Wheezing. Rescue     amLODIPine 5 MG tablet  Commonly known as: NORVASC  Take 5 mg by mouth.     aspirin 325 MG EC tablet  Commonly known as: ECOTRIN  Take 325 mg by mouth once daily.     azelastine 137 mcg (0.1 %) nasal spray  Commonly known as: ASTELIN  1 spray (137 mcg total) by Nasal route 2 (two) times daily. for 7 days     carvediloL 12.5 MG tablet  Commonly known as: COREG  Take 2 tablets (25 mg total) by mouth 2 (two) times daily with meals.     pulse oximeter device  Commonly known as: pulse oximeter  by Apply Externally route 2 (two) times a day. Use twice daily at 8 AM and 3 PM and [facility] as [facility].            Indwelling Lines/Drains at time of discharge:   Lines/Drains/Airways     None                 Time spent on the discharge of patient: 46 minutes         Zakia Wood NP  Department of Hospital Medicine  [facility]

## 2023-10-01 NOTE — DISCHARGE INSTRUCTIONS
Rest and drink adequate fluids.  Take medications as prescribed.  Follow-up as directed.  Return to ED for any worsening symptoms or concerns.    Discharge Instructions, [facility] Medicine    Thank you for choosing [facility] for your medical care.     You were admitted to the hospital with COPD exacerbation.     Please note your discharge instructions, including diet/activity restrictions, follow-up appointments, and medication changes.  If you have any questions about your medical issues, prescriptions, or any other questions, please feel free to contact the [facility] Medicine Dept at [phone] and we will help.    If you are previously with Home health, outpatient PT/OT or under a therapy program, you are cleared to return to those programs.    Please direct all long term medication refills and follow up to your primary care provider. Thank you again for letting us take care of your health care needs.    Please note the following discharge instructions per your discharging physician-  Zakia Wood NP

## 2023-10-01 NOTE — HOSPITAL COURSE
Patient was monitored closely during his hospital stay.  He was initiated on IV steroids, IV Levaquin, neb treatments and supplemental oxygen.  Patient's symptoms improved with treatment.  He remained stable on 2 L nasal cannula.  He was evaluated for home oxygen and qualified and case management assisted with arranging on discharge.  Patient was educated on importance of smoking cessation.  Patient felt well and desired to go home.  He was cleared for discharge and verbalized understanding of discharge instructions and return precautions.  Outpatient appointments were arranged with pulmonology and PCP for close outpatient follow-up.    Physical exam:  Awake alert oriented x3, pleasant, no acute distress   Heart-regular rate rhythm, no edema   Lungs-slightly diminished with very faint expiratory wheeze to lower lobes, respirations even unlabored, stable on 2 L nasal cannula  Abdomen-soft nontender normoactive bowel sounds

## 2023-10-01 NOTE — CARE UPDATE
10/01/23 0935   PRE-TX-O2   Device (Oxygen Therapy) room air   SpO2 (!) 90 %   Home Oxygen Qualification   $ Home O2 Qualification Tech time 15 minutes   Room Air SpO2 At Rest 90 %   Room Air SpO2 During Ambulation (!) 87 %   SpO2 During Ambulation on O2 90 %   Heart Rate on O2 90 bpm   Ambulation O2 LPM 2 LPM   SpO2 Post Ambulation 92 %   Post Ambulation Heart Rate 99 bpm   Post Ambulation O2 LPM 2 LPM   Home O2 Eval Comments patient needs 2LNC TO ACHEIVE 90%

## 2023-10-02 ENCOUNTER — TELEPHONE (OUTPATIENT)
Dept: FAMILY MEDICINE | Facility: CLINIC | Age: 63
End: 2023-10-02
Payer: MEDICARE

## 2023-10-02 NOTE — PLAN OF CARE
Patient discharged prior to case management clearance    Home oxygen approved thru [facility], per chart review   10/02/23 1449   Final Note   Assessment Type Final Discharge Note   Anticipated Discharge Disposition Home

## 2023-10-02 NOTE — TELEPHONE ENCOUNTER
----- Message from Hector Carvalho sent at 10/2/2023  1:55 PM CDT -----  Type:  Sooner Appointment Request    Caller is requesting a sooner appointment.  Caller declined first available appointment listed below.  Caller will not accept being placed on the waitlist and is requesting a message be sent to doctor.    Name of Caller:  Patient  When is the first available appointment?  Out of Template  Symptoms:  Establish care  Best Call Back Number:  [phone]  Additional Information:

## 2023-10-03 ENCOUNTER — TELEPHONE (OUTPATIENT)
Dept: FAMILY MEDICINE | Facility: CLINIC | Age: 63
End: 2023-10-03
Payer: MEDICARE

## 2023-10-03 NOTE — TELEPHONE ENCOUNTER
----- Message from Brooke Vergara sent at 10/3/2023  2:52 PM CDT -----  Contact: PT  Type:  Patient Returning Call    Who Called:  PT  Who Left Message for Patient:  Rosibel  Does the patient know what this is regarding?:  yes  Best Call Back Number:  [phone]  Additional Information:

## 2023-10-04 LAB
BACTERIA BLD CULT: NORMAL
BACTERIA BLD CULT: NORMAL

## 2023-10-06 ENCOUNTER — OFFICE VISIT (OUTPATIENT)
Dept: PULMONOLOGY | Facility: CLINIC | Age: 63
End: 2023-10-06
Payer: MEDICARE

## 2023-10-06 VITALS
HEART RATE: 99 BPM | BODY MASS INDEX: 24.81 KG/M2 | DIASTOLIC BLOOD PRESSURE: 76 MMHG | SYSTOLIC BLOOD PRESSURE: 130 MMHG | WEIGHT: 168 LBS | OXYGEN SATURATION: 97 %

## 2023-10-06 DIAGNOSIS — R09.02 HYPOXEMIA: ICD-10-CM

## 2023-10-06 DIAGNOSIS — J44.9 CHRONIC OBSTRUCTIVE PULMONARY DISEASE, UNSPECIFIED COPD TYPE: Primary | ICD-10-CM

## 2023-10-06 DIAGNOSIS — F17.210 NICOTINE DEPENDENCE, CIGARETTES, UNCOMPLICATED: ICD-10-CM

## 2023-10-06 DIAGNOSIS — Z72.0 TOBACCO ABUSE: ICD-10-CM

## 2023-10-06 PROCEDURE — 99203 OFFICE O/P NEW LOW 30 MIN: CPT | Mod: S$GLB,,, | Performed by: NURSE PRACTITIONER

## 2023-10-06 PROCEDURE — 1159F PR MEDICATION LIST DOCUMENTED IN MEDICAL RECORD: ICD-10-PCS | Mod: CPTII,S$GLB,, | Performed by: NURSE PRACTITIONER

## 2023-10-06 PROCEDURE — 1159F MED LIST DOCD IN RCRD: CPT | Mod: CPTII,S$GLB,, | Performed by: NURSE PRACTITIONER

## 2023-10-06 PROCEDURE — 3075F SYST BP GE 130 - 139MM HG: CPT | Mod: CPTII,S$GLB,, | Performed by: NURSE PRACTITIONER

## 2023-10-06 PROCEDURE — 3078F DIAST BP <80 MM HG: CPT | Mod: CPTII,S$GLB,, | Performed by: NURSE PRACTITIONER

## 2023-10-06 PROCEDURE — 3008F PR BODY MASS INDEX (BMI) DOCUMENTED: ICD-10-PCS | Mod: CPTII,S$GLB,, | Performed by: NURSE PRACTITIONER

## 2023-10-06 PROCEDURE — 99203 PR OFFICE/OUTPT VISIT, NEW, LEVL III, 30-44 MIN: ICD-10-PCS | Mod: S$GLB,,, | Performed by: NURSE PRACTITIONER

## 2023-10-06 PROCEDURE — 1111F DSCHRG MED/CURRENT MED MERGE: CPT | Mod: CPTII,S$GLB,, | Performed by: NURSE PRACTITIONER

## 2023-10-06 PROCEDURE — 3078F PR MOST RECENT DIASTOLIC BLOOD PRESSURE < 80 MM HG: ICD-10-PCS | Mod: CPTII,S$GLB,, | Performed by: NURSE PRACTITIONER

## 2023-10-06 PROCEDURE — 3008F BODY MASS INDEX DOCD: CPT | Mod: CPTII,S$GLB,, | Performed by: NURSE PRACTITIONER

## 2023-10-06 PROCEDURE — 3075F PR MOST RECENT SYSTOLIC BLOOD PRESS GE 130-139MM HG: ICD-10-PCS | Mod: CPTII,S$GLB,, | Performed by: NURSE PRACTITIONER

## 2023-10-06 PROCEDURE — 1111F PR DISCHARGE MEDS RECONCILED W/ CURRENT OUTPATIENT MED LIST: ICD-10-PCS | Mod: CPTII,S$GLB,, | Performed by: NURSE PRACTITIONER

## 2023-10-06 RX ORDER — IPRATROPIUM BROMIDE AND ALBUTEROL SULFATE 2.5; .5 MG/3ML; MG/3ML
3 SOLUTION RESPIRATORY (INHALATION) EVERY 6 HOURS PRN
Qty: 120 EACH | Refills: 6 | Status: SHIPPED | OUTPATIENT
Start: 2023-10-06 | End: 2024-10-05

## 2023-10-06 NOTE — PROGRESS NOTES
SUBJECTIVE:    Patient ID: Anjum Campbell is a 63 y.o. male.    Chief Complaint: New Patient (New Patient/Hospital Follow up SOB)    HPI  Patient here today for hospital follow up for a COPD exacerbation with hypoxemia.  He was on vacation in [place] and got sick. As soon as he got home they went to urgent care and was then told to go to the ER due to hypoxemia. He is a smoker.  He has elevated Co2 on CMP during hospitalization and on past lab work. He is a current smoker, he is aware that he needs to quit smoking. He is taking the antibiotics and steroids that he was discharged on from the hospital. He is using Duoneb every 4-6 hours via nebulizer as well.  He has a history of CHF as well.    Past Medical History:   Diagnosis Date    Back injury 1994    Chest pain     CHF (congestive heart failure)     Hypertension     Thymoma 2018     Past Surgical History:   Procedure Laterality Date    back fusion   1995    lumbar spine    CATHETERIZATION OF BOTH LEFT AND RIGHT HEART Bilateral 6/28/2018    Procedure: Heart Cath-Bilateral;  Surgeon: Gerardo Trevizo MD;  Location: [facility] CATH LAB;  Service: Cardiology;  Laterality: Bilateral;  RN PREOP 6/27/2018    CORONARY ARTERY BYPASS GRAFT (CABG) N/A 8/13/2018    Procedure: CABGx1;  Surgeon: Orville Payne MD;  Location: [facility] OR [address] [place] [address];  Service: Cardiovascular;  Laterality: N/A;    MEDIASTINAL MASS EXCISION  8/13/2018    Procedure: EXCISION, MASS, MEDIASTINUM;  Surgeon: Orville Payne MD;  Location: [facility] OR [address] [place] [address];  Service: Cardiovascular;;     Family History   Problem Relation Age of Onset    Lung cancer Father         Social History:   Marital Status:   Occupation: Data Unavailable  Alcohol History:  reports current alcohol use.  Tobacco History:  reports that he has been smoking cigarettes. He has a 7.5 pack-year smoking history. He has never used smokeless tobacco.  Drug History:  reports no history of drug use.    Review of patient's  allergies indicates:  No Known Allergies    Current Outpatient Medications   Medication Sig Dispense Refill    albuterol (PROVENTIL/VENTOLIN HFA) 90 mcg/actuation inhaler Inhale 2 puffs into the lungs every 6 (six) hours as needed for Wheezing. Rescue 18 g 0    albuterol-ipratropium (DUO-NEB) 2.5 mg-0.5 mg/3 mL nebulizer solution Take 3 mLs by nebulization every 4 (four) hours as needed for Wheezing or Shortness of Breath. Rescue 75 mL 1    amLODIPine (NORVASC) 5 MG tablet Take 5 mg by mouth.      aspirin (ECOTRIN) 325 MG EC tablet Take 325 mg by mouth once daily.      atorvastatin (LIPITOR) 40 MG tablet Take 40 mg by mouth.      carvedilol (COREG) 12.5 MG tablet Take 2 tablets (25 mg total) by mouth 2 (two) times daily with meals. 360 tablet 3    dextromethorphan-guaiFENesin  mg (MUCINEX DM)  mg per 12 hr tablet Take 1 tablet by mouth 2 (two) times daily. for 10 days 20 tablet 0    guaiFENesin-codeine 100-10 mg/5 ml (TUSSI-ORGANIDIN NR)  mg/5 mL syrup Take 5 mLs by mouth every 6 (six) hours as needed for Cough. 237 mL 0    levoFLOXacin (LEVAQUIN) 750 MG tablet Take 1 tablet (750 mg total) by mouth once daily. for 5 days 5 tablet 0    predniSONE (DELTASONE) 20 MG tablet Take 3 tablets (60 mg total) by mouth once daily for 3 days, THEN 2 tablets (40 mg total) once daily for 3 days, THEN 1 tablet (20 mg total) once daily for 3 days. 18 tablet 0    albuterol-ipratropium (DUO-NEB) 2.5 mg-0.5 mg/3 mL nebulizer solution Take 3 mLs by nebulization every 6 (six) hours as needed for Wheezing. Rescue 120 each 6    azelastine (ASTELIN) 137 mcg (0.1 %) nasal spray 1 spray (137 mcg total) by Nasal route 2 (two) times daily. for 7 days 30 mL 0    pulse oximeter (PULSE OXIMETER) device by Apply Externally route 2 (two) times a day. Use twice daily at 8 AM and 3 PM and [facility] as directed. 1 each 0     No current facility-administered medications for this visit.       Last chest xray  10/2023  IMPRESSION:     No acute cardiopulmonary abnormality      Review of Systems  General: Feeling Well. Had fever and night sweats before he got sick  Eyes: Vision is good.  ENT:  No sinusitis or pharyngitis.   Heart:: No chest pain or palpitations.  Lungs: dyspnea better,  chronic bronchitis .  GI: No Nausea, vomiting, constipation, diarrhea, or reflux.  : No dysuria, hesitancy, or nocturia.  Musculoskeletal: No joint pain or myalgias.  Skin: No lesions or rashes.  Neuro: No headaches or neuropathy.  Lymph: No edema or adenopathy.  Psych: No anxiety or depression.  Endo: No weight change.    OBJECTIVE:      /76 (BP Location: Right arm, Patient Position: Sitting, BP Method: Medium (Manual))   Pulse 99   Wt 76.2 kg (168 lb)   SpO2 97%   BMI 24.81 kg/m²     Physical Exam  GENERAL: Older patient in no distress. Smells of cigarette smoke   HEENT: Pupils equal and reactive. Extraocular movements intact. Nose intact.      Pharynx moist.  NECK: Supple.   HEART: Regular rate and rhythm. No murmur or gallop auscultated.  LUNGS: quiet  ABDOMEN: Bowel sounds present. Non-tender, no masses palpated.  EXTREMITIES: Normal muscle tone and joint movement, no cyanosis or clubbing.   LYMPHATICS: No adenopathy palpated, no edema.  SKIN: Dry, intact, no lesions.   NEURO: Cranial nerves II-XII intact. Motor strength 5/5 bilaterally, upper and lower extremities.  PSYCH: Appropriate affect.    Assessment:       1. Chronic obstructive pulmonary disease, unspecified COPD type    2. Tobacco abuse    3. Nicotine dependence, cigarettes, uncomplicated    4. Hypoxemia          Plan:       Chronic obstructive pulmonary disease, unspecified COPD type  -     Complete PFT with bronchodilator; Future    Tobacco abuse  -     CT Chest Lung Screening Low Dose; Future; Expected date: 10/06/2023    Nicotine dependence, cigarettes, uncomplicated  -     CT Chest Lung Screening Low Dose; Future; Expected date: 10/06/2023    Hypoxemia  -      Stress test, pulmonary; Future; Expected date: 10/06/2023    Other orders  -     albuterol-ipratropium (DUO-NEB) 2.5 mg-0.5 mg/3 mL nebulizer solution; Take 3 mLs by nebulization every 6 (six) hours as needed for Wheezing. Rescue  Dispense: 120 each; Refill: 6      PFT schedule  after done steroids   6 minute walk   Screening CT of chest    Alpha 1 kit   Continue to use the Duoneb every 6 hours   Finish the antibiotics and prednisone   Follow up in about 3 weeks (around 10/27/2023).

## 2023-10-06 NOTE — PATIENT INSTRUCTIONS
PFT schedule  after done steroids   6 minute walk   Screening CT of chest    Alpha 1 kit   Continue to use the Duoneb every 6 hours   Finish the antibiotics and prednisone

## 2023-10-08 ENCOUNTER — TELEPHONE (OUTPATIENT)
Dept: ADMINISTRATIVE | Facility: CLINIC | Age: 63
End: 2023-10-08
Payer: MEDICARE

## 2023-10-08 NOTE — TELEPHONE ENCOUNTER
Day 5 Post-Discharge SMS Tracker     Pt wanted to know if his scans were scheduled.  Chart reviewed and it looks like it is pending authorization.  Pt notified that he should get a call this week once it is authorized.  Pt verbalized understanding.

## 2023-10-20 ENCOUNTER — PATIENT MESSAGE (OUTPATIENT)
Dept: PULMONOLOGY | Facility: CLINIC | Age: 63
End: 2023-10-20

## 2023-10-20 ENCOUNTER — HOSPITAL ENCOUNTER (OUTPATIENT)
Dept: RADIOLOGY | Facility: HOSPITAL | Age: 63
Discharge: HOME | End: 2023-10-20
Attending: NURSE PRACTITIONER
Payer: MEDICARE

## 2023-10-20 ENCOUNTER — HOSPITAL ENCOUNTER (OUTPATIENT)
Dept: PULMONOLOGY | Facility: HOSPITAL | Age: 63
Discharge: HOME | End: 2023-10-20
Attending: NURSE PRACTITIONER
Payer: MEDICARE

## 2023-10-20 ENCOUNTER — TELEPHONE (OUTPATIENT)
Dept: PULMONOLOGY | Facility: CLINIC | Age: 63
End: 2023-10-20

## 2023-10-20 DIAGNOSIS — J44.9 CHRONIC OBSTRUCTIVE PULMONARY DISEASE, UNSPECIFIED COPD TYPE: ICD-10-CM

## 2023-10-20 DIAGNOSIS — Z72.0 TOBACCO ABUSE: ICD-10-CM

## 2023-10-20 DIAGNOSIS — R09.02 HYPOXEMIA: ICD-10-CM

## 2023-10-20 DIAGNOSIS — F17.210 NICOTINE DEPENDENCE, CIGARETTES, UNCOMPLICATED: ICD-10-CM

## 2023-10-20 PROCEDURE — 94060 EVALUATION OF WHEEZING: CPT

## 2023-10-20 PROCEDURE — 71271 CT THORAX LUNG CANCER SCR C-: CPT | Mod: TC

## 2023-10-20 PROCEDURE — 94726 PLETHYSMOGRAPHY LUNG VOLUMES: CPT

## 2023-10-20 PROCEDURE — 71271 CT THORAX LUNG CANCER SCR C-: CPT | Mod: 26,,, | Performed by: RADIOLOGY

## 2023-10-20 PROCEDURE — 71271 CT CHEST LUNG SCREENING LOW DOSE: ICD-10-PCS | Mod: 26,,, | Performed by: RADIOLOGY

## 2023-10-20 PROCEDURE — 94618 PULMONARY STRESS TESTING: CPT

## 2023-10-20 PROCEDURE — 94729 DIFFUSING CAPACITY: CPT

## 2023-10-20 RX ORDER — ALBUTEROL SULFATE 2.5 MG/.5ML
SOLUTION RESPIRATORY (INHALATION)
Status: DISCONTINUED
Start: 2023-10-20 | End: 2023-10-20 | Stop reason: HOSPADM

## 2023-10-20 RX ORDER — ALBUTEROL SULFATE 2.5 MG/.5ML
2.5 SOLUTION RESPIRATORY (INHALATION)
Status: DISPENSED | OUTPATIENT
Start: 2023-10-20

## 2023-10-20 RX ORDER — BUDESONIDE, GLYCOPYRROLATE, AND FORMOTEROL FUMARATE 160; 9; 4.8 UG/1; UG/1; UG/1
2 AEROSOL, METERED RESPIRATORY (INHALATION) 2 TIMES DAILY
Qty: 10.7 G | Refills: 6 | Status: SHIPPED | OUTPATIENT
Start: 2023-10-20 | End: 2024-03-06

## 2023-10-20 NOTE — TELEPHONE ENCOUNTER
Screening Ct   Impression:     Lung-RADS Category:  2 - Benign Appearance or Behavior - continue annual screening with LDCT in 12 months.     Clinically or potentially clinically significant non lung cancer finding:  S - Significant.     Prior Lung Cancer Modifier:  No history of prior lung cancer.     Unchanged left pulmonary nodule with no new pulmonary findings.  Pulmonary emphysema.  Cardiac post treatment change and coronary artery disease.

## 2023-10-20 NOTE — TELEPHONE ENCOUNTER
Message left informing patient that I have sent Breztri to his pharmacy to start using 2 puffs twice a day, rinse after. Use the rescue meds only as needed now.   His walk test was non hypoxemic

## 2023-10-20 NOTE — TELEPHONE ENCOUNTER
PFT shows severe obstruction with no change to bronchodilator, hyper inflated with air trapping, moderate diffusion defect.     6 minute walk is non hypoxemic

## 2023-10-23 LAB
DLCO SINGLE BREATH LLN: 21.19
DLCO SINGLE BREATH PRE REF: 52.8 %
DLCO SINGLE BREATH REF: 28.11
DLCOC SBVA LLN: 2.85
DLCOC SBVA REF: 4.06
DLCOC SINGLE BREATH LLN: 21.19
DLCOC SINGLE BREATH REF: 28.11
DLCOVA LLN: 2.85
DLCOVA PRE REF: 64.9 %
DLCOVA PRE: 2.64 ML/(MIN*MMHG*L) (ref 2.85–5.27)
DLCOVA REF: 4.06
ERV LLN: -16448.85
ERV PRE REF: 63.9 %
ERV REF: 1.15
FEF 25 75 CHG: 15.3 %
FEF 25 75 LLN: 1.37
FEF 25 75 POST REF: 19.2 %
FEF 25 75 PRE REF: 16.7 %
FEF 25 75 REF: 2.84
FET100 CHG: -1.7 %
FEV1 CHG: 4.5 %
FEV1 FVC CHG: 1.1 %
FEV1 FVC LLN: 65
FEV1 FVC POST REF: 63 %
FEV1 FVC PRE REF: 62.3 %
FEV1 FVC REF: 77
FEV1 LLN: 2.57
FEV1 POST REF: 38 %
FEV1 PRE REF: 36.3 %
FEV1 REF: 3.43
FRCPLETH LLN: 2.57
FRCPLETH PREREF: 183.3 %
FRCPLETH REF: 3.56
FVC CHG: 3.4 %
FVC LLN: 3.37
FVC POST REF: 60.1 %
FVC PRE REF: 58.2 %
FVC REF: 4.43
IVC PRE: 3 L (ref 3.37–5.51)
IVC SINGLE BREATH LLN: 3.37
IVC SINGLE BREATH PRE REF: 67.7 %
IVC SINGLE BREATH REF: 4.43
MVV LLN: 111
MVV PRE REF: 41.4 %
MVV REF: 131
PEF CHG: 4.3 %
PEF LLN: 6.67
PEF POST REF: 39.6 %
PEF PRE REF: 37.9 %
PEF REF: 8.92
POST FEF 25 75: 0.55 L/S (ref 1.37–4.83)
POST FET 100: 7.49 SEC
POST FEV1 FVC: 48.77 % (ref 65.24–88.09)
POST FEV1: 1.3 L (ref 2.57–4.23)
POST FVC: 2.67 L (ref 3.37–5.51)
POST PEF: 3.53 L/S (ref 6.67–11.18)
PRE DLCO: 14.85 ML/(MIN*MMHG) (ref 21.19–35.04)
PRE ERV: 0.74 L (ref -16448.85–16451.15)
PRE FEF 25 75: 0.47 L/S (ref 1.37–4.83)
PRE FET 100: 7.62 SEC
PRE FEV1 FVC: 48.26 % (ref 65.24–88.09)
PRE FEV1: 1.24 L (ref 2.57–4.23)
PRE FRC PL: 6.53 L (ref 2.57–4.55)
PRE FVC: 2.58 L (ref 3.37–5.51)
PRE MVV: 54.29 L/MIN (ref 111.42–150.75)
PRE PEF: 3.39 L/S (ref 6.67–11.18)
PRE RV: 5.79 L (ref 1.73–3.08)
PRE TLC: 8.54 L (ref 5.77–8.07)
RAW LLN: 3.06
RAW PRE REF: 253.2 %
RAW PRE: 7.74 CMH2O*S/L (ref 3.06–3.06)
RAW REF: 3.06
RV LLN: 1.73
RV PRE REF: 240.5 %
RV REF: 2.41
RVTLC LLN: 29
RVTLC PRE REF: 179.5 %
RVTLC PRE: 67.77 % (ref 28.77–46.73)
RVTLC REF: 38
TLC LLN: 5.77
TLC PRE REF: 123.4 %
TLC REF: 6.92
VA PRE: 5.63 L (ref 6.77–6.77)
VA SINGLE BREATH LLN: 6.77
VA SINGLE BREATH PRE REF: 83.1 %
VA SINGLE BREATH REF: 6.77
VC LLN: 3.37
VC PRE REF: 62.1 %
VC PRE: 2.75 L (ref 3.37–5.51)
VC REF: 4.43

## 2023-10-23 PROCEDURE — 94618 PULMONARY STRESS TESTING: CPT | Mod: 26,,, | Performed by: INTERNAL MEDICINE

## 2023-10-23 PROCEDURE — 94726 PULM FUNCT TST PLETHYSMOGRAP: ICD-10-PCS | Mod: 26,,, | Performed by: INTERNAL MEDICINE

## 2023-10-23 PROCEDURE — 94726 PLETHYSMOGRAPHY LUNG VOLUMES: CPT | Mod: 26,,, | Performed by: INTERNAL MEDICINE

## 2023-10-23 PROCEDURE — 94729 DIFFUSING CAPACITY: CPT | Mod: 26,,, | Performed by: INTERNAL MEDICINE

## 2023-10-23 PROCEDURE — 94618 PULMONARY STRESS TESTING: ICD-10-PCS | Mod: 26,,, | Performed by: INTERNAL MEDICINE

## 2023-10-23 PROCEDURE — 94729 PR C02/MEMBANE DIFFUSE CAPACITY: ICD-10-PCS | Mod: 26,,, | Performed by: INTERNAL MEDICINE

## 2023-10-23 PROCEDURE — 94060 EVALUATION OF WHEEZING: CPT | Mod: 26,59,, | Performed by: INTERNAL MEDICINE

## 2023-10-23 PROCEDURE — 94060 PR EVAL OF BRONCHOSPASM: ICD-10-PCS | Mod: 26,59,, | Performed by: INTERNAL MEDICINE

## 2023-10-27 ENCOUNTER — OFFICE VISIT (OUTPATIENT)
Dept: PULMONOLOGY | Facility: CLINIC | Age: 63
End: 2023-10-27
Payer: MEDICARE

## 2023-10-27 VITALS
HEART RATE: 95 BPM | BODY MASS INDEX: 24.68 KG/M2 | DIASTOLIC BLOOD PRESSURE: 76 MMHG | WEIGHT: 167.13 LBS | SYSTOLIC BLOOD PRESSURE: 140 MMHG | OXYGEN SATURATION: 98 %

## 2023-10-27 DIAGNOSIS — Z95.810 ICD (IMPLANTABLE CARDIOVERTER-DEFIBRILLATOR) IN PLACE: ICD-10-CM

## 2023-10-27 DIAGNOSIS — J44.1 COPD EXACERBATION: ICD-10-CM

## 2023-10-27 DIAGNOSIS — Z72.0 TOBACCO ABUSE: ICD-10-CM

## 2023-10-27 DIAGNOSIS — I50.22 CHRONIC SYSTOLIC HEART FAILURE: ICD-10-CM

## 2023-10-27 DIAGNOSIS — I25.118 CORONARY ARTERY DISEASE OF NATIVE ARTERY OF NATIVE HEART WITH STABLE ANGINA PECTORIS: ICD-10-CM

## 2023-10-27 PROCEDURE — 1159F MED LIST DOCD IN RCRD: CPT | Mod: CPTII,S$GLB,, | Performed by: INTERNAL MEDICINE

## 2023-10-27 PROCEDURE — 3008F BODY MASS INDEX DOCD: CPT | Mod: CPTII,S$GLB,, | Performed by: INTERNAL MEDICINE

## 2023-10-27 PROCEDURE — 3008F PR BODY MASS INDEX (BMI) DOCUMENTED: ICD-10-PCS | Mod: CPTII,S$GLB,, | Performed by: INTERNAL MEDICINE

## 2023-10-27 PROCEDURE — 99215 PR OFFICE/OUTPT VISIT, EST, LEVL V, 40-54 MIN: ICD-10-PCS | Mod: S$GLB,,, | Performed by: INTERNAL MEDICINE

## 2023-10-27 PROCEDURE — 99215 OFFICE O/P EST HI 40 MIN: CPT | Mod: S$GLB,,, | Performed by: INTERNAL MEDICINE

## 2023-10-27 PROCEDURE — 3078F PR MOST RECENT DIASTOLIC BLOOD PRESSURE < 80 MM HG: ICD-10-PCS | Mod: CPTII,S$GLB,, | Performed by: INTERNAL MEDICINE

## 2023-10-27 PROCEDURE — 1159F PR MEDICATION LIST DOCUMENTED IN MEDICAL RECORD: ICD-10-PCS | Mod: CPTII,S$GLB,, | Performed by: INTERNAL MEDICINE

## 2023-10-27 PROCEDURE — 1111F PR DISCHARGE MEDS RECONCILED W/ CURRENT OUTPATIENT MED LIST: ICD-10-PCS | Mod: CPTII,S$GLB,, | Performed by: INTERNAL MEDICINE

## 2023-10-27 PROCEDURE — 3077F PR MOST RECENT SYSTOLIC BLOOD PRESSURE >= 140 MM HG: ICD-10-PCS | Mod: CPTII,S$GLB,, | Performed by: INTERNAL MEDICINE

## 2023-10-27 PROCEDURE — 1160F RVW MEDS BY RX/DR IN RCRD: CPT | Mod: CPTII,S$GLB,, | Performed by: INTERNAL MEDICINE

## 2023-10-27 PROCEDURE — 3077F SYST BP >= 140 MM HG: CPT | Mod: CPTII,S$GLB,, | Performed by: INTERNAL MEDICINE

## 2023-10-27 PROCEDURE — 1160F PR REVIEW ALL MEDS BY PRESCRIBER/CLIN PHARMACIST DOCUMENTED: ICD-10-PCS | Mod: CPTII,S$GLB,, | Performed by: INTERNAL MEDICINE

## 2023-10-27 PROCEDURE — 3078F DIAST BP <80 MM HG: CPT | Mod: CPTII,S$GLB,, | Performed by: INTERNAL MEDICINE

## 2023-10-27 PROCEDURE — 1111F DSCHRG MED/CURRENT MED MERGE: CPT | Mod: CPTII,S$GLB,, | Performed by: INTERNAL MEDICINE

## 2023-10-27 NOTE — PROGRESS NOTES
"New Office Visit/Consultation Note *     Patient Name: Anjum Campbell  MRN: [id number]  : 1960      Reason for visit: COPD    HPI:     10/27/2023 - Pt here to be evaluated for COPD, has seen Helen Banks but wishes to change care.  He has had PFT showing severe COPD, nonhypoxemic walk test and LDSCT chest reviewed (will need repeat 1 year).  He was in hospital recently and is feeling better.  He has just started on BREZTRI and has prn ALBUTEROL (we discussed the roles of controlling and rescue medications).  He has been a smoker for about 40 years and about 3/4 PPD (though cutting back since his hospital stay).  We discussed the need to fully stop smoking and he is considering trying to "cold turkey" or at least get to < 1/2 PPD by next visit.    Past Medical History    Past Medical History:   Diagnosis Date    Back injury     Chest pain     CHF (congestive heart failure)     Hypertension     Thymoma        Past Surgical History    Past Surgical History:   Procedure Laterality Date    back fusion       lumbar spine    CATHETERIZATION OF BOTH LEFT AND RIGHT HEART Bilateral 2018    Procedure: Heart Cath-Bilateral;  Surgeon: Gerardo Trevizo MD;  Location: [facility] CATH LAB;  Service: Cardiology;  Laterality: Bilateral;  RN PREOP 2018    CORONARY ARTERY BYPASS GRAFT (CABG) N/A 2018    Procedure: CABGx1;  Surgeon: Orville Payen MD;  Location: [facility] OR [address] [place] [address];  Service: Cardiovascular;  Laterality: N/A;    MEDIASTINAL MASS EXCISION  2018    Procedure: EXCISION, MASS, MEDIASTINUM;  Surgeon: Orville Payne MD;  Location: [facility] OR [address] [place] [address];  Service: Cardiovascular;;       Medications      Current Outpatient Medications:     albuterol (PROVENTIL/VENTOLIN HFA) 90 mcg/actuation inhaler, Inhale 2 puffs into the lungs every 6 (six) hours as needed for Wheezing. Rescue, Disp: 18 g, Rfl: 0    albuterol-ipratropium (DUO-NEB) 2.5 mg-0.5 mg/3 mL nebulizer solution, Take 3 mLs by " nebulization every 4 (four) hours as needed for Wheezing or Shortness of Breath. Rescue, Disp: 75 mL, Rfl: 1    albuterol-ipratropium (DUO-NEB) 2.5 mg-0.5 mg/3 mL nebulizer solution, Take 3 mLs by nebulization every 6 (six) hours as needed for Wheezing. Rescue, Disp: 120 each, Rfl: 6    amLODIPine (NORVASC) 5 MG tablet, Take 5 mg by mouth., Disp: , Rfl:     aspirin (ECOTRIN) 325 MG EC tablet, Take 325 mg by mouth once daily., Disp: , Rfl:     atorvastatin (LIPITOR) 40 MG tablet, Take 40 mg by mouth., Disp: , Rfl:     budesonide-glycopyr-formoterol (BREZTRI AEROSPHERE) 160-9-4.8 mcg/actuation HFAA, Inhale 2 puffs into the lungs 2 (two) times daily., Disp: 10.7 g, Rfl: 6    carvedilol (COREG) 12.5 MG tablet, Take 2 tablets (25 mg total) by mouth 2 (two) times daily with meals., Disp: 360 tablet, Rfl: 3    azelastine (ASTELIN) 137 mcg (0.1 %) nasal spray, 1 spray (137 mcg total) by Nasal route 2 (two) times daily. for 7 days, Disp: 30 mL, Rfl: 0    pulse oximeter (PULSE OXIMETER) device, by Apply Externally route 2 (two) times a day. Use twice daily at 8 AM and 3 PM and record the value in MyChart as directed., Disp: 1 each, Rfl: 0    Current Facility-Administered Medications:     albuterol sulfate nebulizer solution 2.5 mg, 2.5 mg, Nebulization, 1 time in Clinic/HOD, Alex Ornelas MD    Allergies    Review of patient's allergies indicates:  No Known Allergies    SocHx    Social History     Tobacco Use   Smoking Status Every Day    Current packs/day: 0.25    Average packs/day: 0.3 packs/day for 30.0 years (7.5 ttl pk-yrs)    Types: Cigarettes   Smokeless Tobacco Never   Tobacco Comments    9/27/18 ---only a few cigs per day, trying to quit    Smokes 6 - 8 daily       Social History     Substance and Sexual Activity   Alcohol Use Yes    Comment: occaSIONAL       FMHx    Family History   Problem Relation Age of Onset    Lung cancer Father          Review of Systems  Review of Systems   Constitutional:  Negative for  chills, diaphoresis, fever, malaise/fatigue and weight loss.   HENT:  Negative for congestion.    Eyes:  Negative for pain.   Respiratory:  Positive for cough and shortness of breath. Negative for hemoptysis, sputum production, wheezing and stridor.    Cardiovascular:  Negative for chest pain, palpitations, orthopnea, claudication, leg swelling and PND.   Gastrointestinal:  Negative for abdominal pain, constipation, diarrhea, heartburn, nausea and vomiting.   Genitourinary:  Negative for dysuria, frequency and urgency.   Musculoskeletal:  Negative for falls and myalgias.   Neurological:  Negative for sensory change, focal weakness and weakness.   Psychiatric/Behavioral:  Negative for depression, substance abuse and suicidal ideas. The patient is not nervous/anxious.        Physical Exam    Vitals:    10/27/23 1103   BP: (!) 140/76   BP Location: Right arm   Patient Position: Sitting   BP Method: Medium (Manual)   Pulse: 95   SpO2: 98%   Weight: 75.8 kg (167 lb 1.6 oz)       Physical Exam  Vitals and nursing note reviewed.   Constitutional:       General: He is not in acute distress.     Appearance: Normal appearance. He is well-developed. He is not ill-appearing, toxic-appearing or diaphoretic.   HENT:      Head: Normocephalic and atraumatic.      Right Ear: External ear normal.      Left Ear: External ear normal.      Nose: Nose normal.      Mouth/Throat:      Mouth: Mucous membranes are moist.      Pharynx: Oropharynx is clear. No oropharyngeal exudate or posterior oropharyngeal erythema.   Eyes:      General: No scleral icterus.        Right eye: No discharge.         Left eye: No discharge.      Extraocular Movements: Extraocular movements intact.      Conjunctiva/sclera: Conjunctivae normal.      Pupils: Pupils are equal, round, and reactive to light.   Neck:      Thyroid: No thyromegaly.      Vascular: No JVD.      Trachea: No tracheal deviation.   Cardiovascular:      Rate and Rhythm: Normal rate and regular  rhythm.      Heart sounds: Normal heart sounds. No murmur heard.     No friction rub. No gallop.      Comments: + AICD  Pulmonary:      Effort: Pulmonary effort is normal. No respiratory distress.      Breath sounds: Normal breath sounds. No stridor. No wheezing, rhonchi or rales.      Comments: Decreased BS throughout  No acc m use  Chest:      Chest wall: No tenderness.   Abdominal:      General: Bowel sounds are normal. There is no distension.      Palpations: Abdomen is soft.   Musculoskeletal:         General: No tenderness. Normal range of motion.      Cervical back: Normal range of motion and neck supple.      Right lower leg: No edema.      Left lower leg: No edema.   Lymphadenopathy:      Cervical: No cervical adenopathy.   Skin:     Capillary Refill: Capillary refill takes less than 2 seconds.   Neurological:      General: No focal deficit present.      Mental Status: He is alert and oriented to person, place, and time. Mental status is at baseline.      Cranial Nerves: No cranial nerve deficit.      Motor: No weakness.      Deep Tendon Reflexes: Reflexes are normal and symmetric.   Psychiatric:         Behavior: Behavior normal.         Labs    Lab Results   Component Value Date    WBC 23.27 (H) 10/01/2023    HGB 15.1 10/01/2023    HCT 48.4 10/01/2023     10/01/2023       Sodium   Date Value Ref Range Status   10/01/2023 137 136 - 145 mmol/L Final     Potassium   Date Value Ref Range Status   10/01/2023 4.7 3.5 - 5.1 mmol/L Final     Chloride   Date Value Ref Range Status   10/01/2023 101 95 - 110 mmol/L Final     CO2   Date Value Ref Range Status   10/01/2023 32 (H) 23 - 29 mmol/L Final     Glucose   Date Value Ref Range Status   10/01/2023 148 (H) 70 - 110 mg/dL Final     BUN   Date Value Ref Range Status   10/01/2023 21 8 - 23 mg/dL Final     Creatinine   Date Value Ref Range Status   10/01/2023 0.9 0.5 - 1.4 mg/dL Final     Calcium   Date Value Ref Range Status   10/01/2023 9.5 8.7 - 10.5 mg/dL  Final     Total Protein   Date Value Ref Range Status   10/01/2023 6.9 6.0 - 8.4 g/dL Final     Albumin   Date Value Ref Range Status   10/01/2023 4.0 3.5 - 5.2 g/dL Final     Total Bilirubin   Date Value Ref Range Status   10/01/2023 0.3 0.1 - 1.0 mg/dL Final     Comment:     For infants and newborns, interpretation of results should be based  on gestational age, weight and in agreement with clinical  observations.    Premature Infant recommended reference ranges:  Up to 24 hours.............<8.0 mg/dL  Up to 48 hours............<12.0 mg/dL  3-5 days..................<15.0 mg/dL  6-29 days.................<15.0 mg/dL       Alkaline Phosphatase   Date Value Ref Range Status   10/01/2023 68 55 - 135 U/L Final     AST   Date Value Ref Range Status   10/01/2023 12 10 - 40 U/L Final     ALT   Date Value Ref Range Status   10/01/2023 11 10 - 44 U/L Final     Anion Gap   Date Value Ref Range Status   10/01/2023 4 (L) 8 - 16 mmol/L Final       Xrays        Impression/Plan    Problem List Items Addressed This Visit          Pulmonary    COPD exacerbation     Exacerbation is better  GOLD E by 2023 guidelines  To start BREZTI and ALBUTEROL  RTC 1 month            Cardiac/Vascular    CAD (coronary artery disease)     Aware          Chronic systolic heart failure     Aware, seems compensated         ICD (implantable cardioverter-defibrillator) in place     Aware             Other    Tobacco abuse     As above  Will follow            I have spent about 45 minutes with the patient taking the history and examining the patient.  We have discussed the diagnoses and current plan and all questions have been answered.  We have discussed the follow up plan.  The patient and family (if present) know to contact the office with any questions they may have.        Benito Carr MD

## 2023-10-27 NOTE — ASSESSMENT & PLAN NOTE
· Exacerbation is better  · GOLD E by 2023 guidelines  · To start BREZTI and ALBUTEROL  · RTC 1 month

## 2023-11-13 ENCOUNTER — TELEPHONE (OUTPATIENT)
Dept: PULMONOLOGY | Facility: CLINIC | Age: 63
End: 2023-11-13

## 2023-12-05 ENCOUNTER — OFFICE VISIT (OUTPATIENT)
Dept: PULMONOLOGY | Facility: CLINIC | Age: 63
End: 2023-12-05
Payer: MEDICARE

## 2023-12-05 VITALS
DIASTOLIC BLOOD PRESSURE: 80 MMHG | OXYGEN SATURATION: 97 % | BODY MASS INDEX: 25.1 KG/M2 | HEART RATE: 92 BPM | SYSTOLIC BLOOD PRESSURE: 140 MMHG | WEIGHT: 170 LBS

## 2023-12-05 DIAGNOSIS — J44.9 CHRONIC OBSTRUCTIVE PULMONARY DISEASE, UNSPECIFIED COPD TYPE: Primary | ICD-10-CM

## 2023-12-05 DIAGNOSIS — Z72.0 TOBACCO ABUSE: ICD-10-CM

## 2023-12-05 PROCEDURE — 99214 OFFICE O/P EST MOD 30 MIN: CPT | Mod: S$GLB,,, | Performed by: INTERNAL MEDICINE

## 2023-12-05 PROCEDURE — 1160F PR REVIEW ALL MEDS BY PRESCRIBER/CLIN PHARMACIST DOCUMENTED: ICD-10-PCS | Mod: CPTII,S$GLB,, | Performed by: INTERNAL MEDICINE

## 2023-12-05 PROCEDURE — 99214 PR OFFICE/OUTPT VISIT, EST, LEVL IV, 30-39 MIN: ICD-10-PCS | Mod: S$GLB,,, | Performed by: INTERNAL MEDICINE

## 2023-12-05 PROCEDURE — 3077F SYST BP >= 140 MM HG: CPT | Mod: CPTII,S$GLB,, | Performed by: INTERNAL MEDICINE

## 2023-12-05 PROCEDURE — 3008F BODY MASS INDEX DOCD: CPT | Mod: CPTII,S$GLB,, | Performed by: INTERNAL MEDICINE

## 2023-12-05 PROCEDURE — 3077F PR MOST RECENT SYSTOLIC BLOOD PRESSURE >= 140 MM HG: ICD-10-PCS | Mod: CPTII,S$GLB,, | Performed by: INTERNAL MEDICINE

## 2023-12-05 PROCEDURE — 3008F PR BODY MASS INDEX (BMI) DOCUMENTED: ICD-10-PCS | Mod: CPTII,S$GLB,, | Performed by: INTERNAL MEDICINE

## 2023-12-05 PROCEDURE — 3079F DIAST BP 80-89 MM HG: CPT | Mod: CPTII,S$GLB,, | Performed by: INTERNAL MEDICINE

## 2023-12-05 PROCEDURE — 3079F PR MOST RECENT DIASTOLIC BLOOD PRESSURE 80-89 MM HG: ICD-10-PCS | Mod: CPTII,S$GLB,, | Performed by: INTERNAL MEDICINE

## 2023-12-05 PROCEDURE — 1159F PR MEDICATION LIST DOCUMENTED IN MEDICAL RECORD: ICD-10-PCS | Mod: CPTII,S$GLB,, | Performed by: INTERNAL MEDICINE

## 2023-12-05 PROCEDURE — 1159F MED LIST DOCD IN RCRD: CPT | Mod: CPTII,S$GLB,, | Performed by: INTERNAL MEDICINE

## 2023-12-05 PROCEDURE — 1160F RVW MEDS BY RX/DR IN RCRD: CPT | Mod: CPTII,S$GLB,, | Performed by: INTERNAL MEDICINE

## 2023-12-05 RX ORDER — AZITHROMYCIN 250 MG/1
TABLET, FILM COATED ORAL
Qty: 6 TABLET | Refills: 0 | Status: SHIPPED | OUTPATIENT
Start: 2023-12-05

## 2023-12-05 NOTE — PROGRESS NOTES
"Office Visit Note *     Patient Name: Anjum Campbell  MRN: [id number]  : 1960      Reason for visit: COPD    HPI:     10/27/2023 - Pt here to be evaluated for COPD, has seen Helen Banks but wishes to change care.  He has had PFT showing severe COPD, nonhypoxemic walk test and LDSCT chest reviewed (will need repeat 1 year).  He was in hospital recently and is feeling better.  He has just started on BREZTRI and has prn ALBUTEROL (we discussed the roles of controlling and rescue medications).  He has been a smoker for about 40 years and about 3/4 PPD (though cutting back since his hospital stay).  We discussed the need to fully stop smoking and he is considering trying to "cold turkey" or at least get to < 1/2 PPD by next visit.    2023 - Here for follow up, starting a few days has had some increased cough with mucus and was "coming down" with something.  He has had some sweats at night as well.  Breathing has been OK but maybe a little more breathless.  He has cut his smoking down to about 1/2 PPD.  His Alpha-1 screen was normal - MM    Past Medical History    Past Medical History:   Diagnosis Date    Back injury     Chest pain     CHF (congestive heart failure)     Hypertension     Thymoma        Past Surgical History    Past Surgical History:   Procedure Laterality Date    back fusion       lumbar spine    CATHETERIZATION OF BOTH LEFT AND RIGHT HEART Bilateral 2018    Procedure: Heart Cath-Bilateral;  Surgeon: Gerardo Trevizo MD;  Location: [facility] CATH LAB;  Service: Cardiology;  Laterality: Bilateral;  RN PREOP 2018    CORONARY ARTERY BYPASS GRAFT (CABG) N/A 2018    Procedure: CABGx1;  Surgeon: Orville Payne MD;  Location: [facility] OR [address] [place] [address];  Service: Cardiovascular;  Laterality: N/A;    MEDIASTINAL MASS EXCISION  2018    Procedure: EXCISION, MASS, MEDIASTINUM;  Surgeon: Orville Payne MD;  Location: [facility] OR [address] [place] [address];  Service: Cardiovascular;; "       Medications      Current Outpatient Medications:     albuterol (PROVENTIL/VENTOLIN HFA) 90 mcg/actuation inhaler, Inhale 2 puffs into the lungs every 6 (six) hours as needed for Wheezing. Rescue, Disp: 18 g, Rfl: 0    albuterol-ipratropium (DUO-NEB) 2.5 mg-0.5 mg/3 mL nebulizer solution, Take 3 mLs by nebulization every 4 (four) hours as needed for Wheezing or Shortness of Breath. Rescue, Disp: 75 mL, Rfl: 1    albuterol-ipratropium (DUO-NEB) 2.5 mg-0.5 mg/3 mL nebulizer solution, Take 3 mLs by nebulization every 6 (six) hours as needed for Wheezing. Rescue, Disp: 120 each, Rfl: 6    amLODIPine (NORVASC) 5 MG tablet, Take 5 mg by mouth., Disp: , Rfl:     aspirin (ECOTRIN) 325 MG EC tablet, Take 325 mg by mouth once daily., Disp: , Rfl:     atorvastatin (LIPITOR) 40 MG tablet, Take 40 mg by mouth., Disp: , Rfl:     budesonide-glycopyr-formoterol (BREZTRI AEROSPHERE) 160-9-4.8 mcg/actuation HFAA, Inhale 2 puffs into the lungs 2 (two) times daily., Disp: 10.7 g, Rfl: 6    pulse oximeter (PULSE OXIMETER) device, by Apply Externally route 2 (two) times a day. Use twice daily at 8 AM and 3 PM and record the value in [facility] as directed., Disp: 1 each, Rfl: 0    azelastine (ASTELIN) 137 mcg (0.1 %) nasal spray, 1 spray (137 mcg total) by Nasal route 2 (two) times daily. for 7 days, Disp: 30 mL, Rfl: 0    azithromycin (ZITHROMAX Z-ALEXA) 250 MG tablet, Take 2 po today then 1 a day for 4 days, Disp: 6 tablet, Rfl: 0    carvedilol (COREG) 12.5 MG tablet, Take 2 tablets (25 mg total) by mouth 2 (two) times daily with meals., Disp: 360 tablet, Rfl: 3    Current Facility-Administered Medications:     albuterol sulfate nebulizer solution 2.5 mg, 2.5 mg, Nebulization, 1 time in Clinic/HOD, Alex Ornelas MD    Allergies    Review of patient's allergies indicates:  No Known Allergies    SocHx    Social History     Tobacco Use   Smoking Status Every Day    Current packs/day: 0.25    Average packs/day: 0.3 packs/day for 30.0  years (7.5 ttl pk-yrs)    Types: Cigarettes   Smokeless Tobacco Never   Tobacco Comments    9/27/18 ---only a few cigs per day, trying to quit    Smokes 6 - 8 daily       Social History     Substance and Sexual Activity   Alcohol Use Yes    Comment: occaSIONAL       FMHx    Family History   Problem Relation Age of Onset    Lung cancer Father          Review of Systems  Review of Systems   Constitutional:  Negative for chills, diaphoresis, fever, malaise/fatigue and weight loss.   HENT:  Negative for congestion.    Eyes:  Negative for pain.   Respiratory:  Positive for cough and shortness of breath. Negative for hemoptysis, sputum production, wheezing and stridor.    Cardiovascular:  Negative for chest pain, palpitations, orthopnea, claudication, leg swelling and PND.   Gastrointestinal:  Negative for abdominal pain, constipation, diarrhea, heartburn, nausea and vomiting.   Genitourinary:  Negative for dysuria, frequency and urgency.   Musculoskeletal:  Negative for falls and myalgias.   Neurological:  Negative for sensory change, focal weakness and weakness.   Psychiatric/Behavioral:  Negative for depression, substance abuse and suicidal ideas. The patient is not nervous/anxious.        Physical Exam    Vitals:    12/05/23 1302   BP: (!) 140/80   BP Location: Left arm   Patient Position: Sitting   BP Method: Medium (Manual)   Pulse: 92   SpO2: 97%   Weight: 77.1 kg (170 lb)       Physical Exam  Vitals and nursing note reviewed.   Constitutional:       General: He is not in acute distress.     Appearance: Normal appearance. He is well-developed. He is not ill-appearing, toxic-appearing or diaphoretic.   HENT:      Head: Normocephalic and atraumatic.      Right Ear: External ear normal.      Left Ear: External ear normal.      Nose: Nose normal.      Mouth/Throat:      Mouth: Mucous membranes are moist.      Pharynx: Oropharynx is clear. No oropharyngeal exudate or posterior oropharyngeal erythema.   Eyes:       General: No scleral icterus.        Right eye: No discharge.         Left eye: No discharge.      Extraocular Movements: Extraocular movements intact.      Conjunctiva/sclera: Conjunctivae normal.      Pupils: Pupils are equal, round, and reactive to light.   Neck:      Thyroid: No thyromegaly.      Vascular: No JVD.      Trachea: No tracheal deviation.   Cardiovascular:      Rate and Rhythm: Normal rate and regular rhythm.      Heart sounds: Normal heart sounds. No murmur heard.     No friction rub. No gallop.      Comments: + AICD  Pulmonary:      Effort: Pulmonary effort is normal. No respiratory distress.      Breath sounds: Normal breath sounds. No stridor. No wheezing, rhonchi or rales.      Comments: Decreased BS throughout  No acc m use  Chest:      Chest wall: No tenderness.   Abdominal:      General: Bowel sounds are normal. There is no distension.      Palpations: Abdomen is soft.   Musculoskeletal:         General: No tenderness. Normal range of motion.      Cervical back: Normal range of motion and neck supple.      Right lower leg: No edema.      Left lower leg: No edema.   Lymphadenopathy:      Cervical: No cervical adenopathy.   Skin:     Capillary Refill: Capillary refill takes less than 2 seconds.   Neurological:      General: No focal deficit present.      Mental Status: He is alert and oriented to person, place, and time. Mental status is at baseline.      Cranial Nerves: No cranial nerve deficit.      Motor: No weakness.      Deep Tendon Reflexes: Reflexes are normal and symmetric.   Psychiatric:         Behavior: Behavior normal.         Labs    Lab Results   Component Value Date    WBC 23.27 (H) 10/01/2023    HGB 15.1 10/01/2023    HCT 48.4 10/01/2023     10/01/2023       Sodium   Date Value Ref Range Status   10/01/2023 137 136 - 145 mmol/L Final     Potassium   Date Value Ref Range Status   10/01/2023 4.7 3.5 - 5.1 mmol/L Final     Chloride   Date Value Ref Range Status   10/01/2023  101 95 - 110 mmol/L Final     CO2   Date Value Ref Range Status   10/01/2023 32 (H) 23 - 29 mmol/L Final     Glucose   Date Value Ref Range Status   10/01/2023 148 (H) 70 - 110 mg/dL Final     BUN   Date Value Ref Range Status   10/01/2023 21 8 - 23 mg/dL Final     Creatinine   Date Value Ref Range Status   10/01/2023 0.9 0.5 - 1.4 mg/dL Final     Calcium   Date Value Ref Range Status   10/01/2023 9.5 8.7 - 10.5 mg/dL Final     Total Protein   Date Value Ref Range Status   10/01/2023 6.9 6.0 - 8.4 g/dL Final     Albumin   Date Value Ref Range Status   10/01/2023 4.0 3.5 - 5.2 g/dL Final     Total Bilirubin   Date Value Ref Range Status   10/01/2023 0.3 0.1 - 1.0 mg/dL Final     Comment:     For infants and newborns, interpretation of results should be based  on gestational age, weight and in agreement with clinical  observations.    Premature Infant recommended reference ranges:  Up to 24 hours.............<8.0 mg/dL  Up to 48 hours............<12.0 mg/dL  3-5 days..................<15.0 mg/dL  6-29 days.................<15.0 mg/dL       Alkaline Phosphatase   Date Value Ref Range Status   10/01/2023 68 55 - 135 U/L Final     AST   Date Value Ref Range Status   10/01/2023 12 10 - 40 U/L Final     ALT   Date Value Ref Range Status   10/01/2023 11 10 - 44 U/L Final     Anion Gap   Date Value Ref Range Status   10/01/2023 4 (L) 8 - 16 mmol/L Final       Xrays        Impression/Plan    Problem List Items Addressed This Visit          Pulmonary    COPD (chronic obstructive pulmonary disease) - Primary     Continue present medications.  Will refill medications as needed.  Instructed patient to contact us with any issues concerning their medications (cost, reactions, etc.).  Have discussed with patient about inciting conditions which may exacerbate their disease.  We did discuss possible new therapies or de-escalation of therapy (if appropriate).  Asked patient if they were interested in pursuing pulmonary  rehabilitation.  All questions answered  RTC 3 months  Patient instructed that they are to call if symptoms change or new issues develop prior to their next visit.                Other    Tobacco abuse     He is working on steadily cutting down the cigarettes.                      Benito Carr MD

## 2023-12-05 NOTE — ASSESSMENT & PLAN NOTE
Continue present medications.  Will refill medications as needed.  Instructed patient to contact us with any issues concerning their medications (cost, reactions, etc.).  Have discussed with patient about inciting conditions which may exacerbate their disease.  We did discuss possible new therapies or de-escalation of therapy (if appropriate).  Asked patient if they were interested in pursuing pulmonary rehabilitation.  All questions answered  RTC 3 months  Patient instructed that they are to call if symptoms change or new issues develop prior to their next visit.

## 2024-03-06 ENCOUNTER — OFFICE VISIT (OUTPATIENT)
Dept: PULMONOLOGY | Facility: CLINIC | Age: 64
End: 2024-03-06
Payer: MEDICARE

## 2024-03-06 VITALS
SYSTOLIC BLOOD PRESSURE: 132 MMHG | HEART RATE: 88 BPM | WEIGHT: 168.38 LBS | DIASTOLIC BLOOD PRESSURE: 80 MMHG | OXYGEN SATURATION: 97 % | BODY MASS INDEX: 24.87 KG/M2

## 2024-03-06 DIAGNOSIS — J44.9 CHRONIC OBSTRUCTIVE PULMONARY DISEASE, UNSPECIFIED COPD TYPE: Primary | ICD-10-CM

## 2024-03-06 DIAGNOSIS — Z72.0 TOBACCO ABUSE: ICD-10-CM

## 2024-03-06 PROCEDURE — 1160F RVW MEDS BY RX/DR IN RCRD: CPT | Mod: CPTII,S$GLB,, | Performed by: INTERNAL MEDICINE

## 2024-03-06 PROCEDURE — 1159F MED LIST DOCD IN RCRD: CPT | Mod: CPTII,S$GLB,, | Performed by: INTERNAL MEDICINE

## 2024-03-06 PROCEDURE — 3075F SYST BP GE 130 - 139MM HG: CPT | Mod: CPTII,S$GLB,, | Performed by: INTERNAL MEDICINE

## 2024-03-06 PROCEDURE — 3008F BODY MASS INDEX DOCD: CPT | Mod: CPTII,S$GLB,, | Performed by: INTERNAL MEDICINE

## 2024-03-06 PROCEDURE — 99214 OFFICE O/P EST MOD 30 MIN: CPT | Mod: S$GLB,,, | Performed by: INTERNAL MEDICINE

## 2024-03-06 PROCEDURE — 3079F DIAST BP 80-89 MM HG: CPT | Mod: CPTII,S$GLB,, | Performed by: INTERNAL MEDICINE

## 2024-03-06 RX ORDER — AMLODIPINE BESYLATE 5 MG/1
5 TABLET ORAL DAILY
Qty: 90 TABLET | Refills: 3 | Status: SHIPPED | OUTPATIENT
Start: 2024-03-06

## 2024-03-06 NOTE — ASSESSMENT & PLAN NOTE
Continue present medications.  Will refill medications as needed.  Instructed patient to contact us with any issues concerning their medications (cost, reactions, etc.).  Have discussed with patient about inciting conditions which may exacerbate their disease.  We did discuss possible new therapies or de-escalation of therapy (if appropriate).  Asked patient if they were interested in pursuing pulmonary rehabilitation.  All questions answered  RTC 6 months  Patient instructed that they are to call if symptoms change or new issues develop prior to their next visit.  [facility]

## 2024-03-06 NOTE — PROGRESS NOTES
"Office Visit Note *     Patient Name: Anjum Campbell  MRN: [id number]  : 1960      Reason for visit: COPD    HPI:     10/27/2023 - Pt here to be evaluated for COPD, has seen Helen Banks but wishes to change care.  He has had PFT showing severe COPD, nonhypoxemic walk test and LDSCT chest reviewed (will need repeat 1 year).  He was in hospital recently and is feeling better.  He has just started on BREZTRI and has prn ALBUTEROL (we discussed the roles of controlling and rescue medications).  He has been a smoker for about 40 years and about 3/4 PPD (though cutting back since his hospital stay).  We discussed the need to fully stop smoking and he is considering trying to "cold turkey" or at least get to < 1/2 PPD by next visit.    2023 - Here for follow up, starting a few days has had some increased cough with mucus and was "coming down" with something.  He has had some sweats at night as well.  Breathing has been OK but maybe a little more breathless.  He has cut his smoking down to about 1/2 PPD.  His Alpha-1 screen was normal - MM    3/6/2024 - Here for follow up, Pt is currently stable on present medications with no recent increases in their symptoms or use of rescue medications.  Since our last visit there have been no hospitalizations or ER visits for their respiratory issues and there does not seem to be anything to suggest unrecognized exacerbations.  I have reviewed the medical regimen and re-educated the pt on the role of rescue and controlling medications.  Inhaler technique and understanding seems adequate.  The patient reports no issues with any of there medications for their COPD.  Refills will be taken care of as needed.  All questions answered.  He is down to 8 cig/d  (he was hoping to be down to 5 per day).  He would like to change to TRELEGY.    Past Medical History    Past Medical History:   Diagnosis Date    Back injury     Chest pain     CHF (congestive heart failure)     " Hypertension     Thymoma 2018       Past Surgical History    Past Surgical History:   Procedure Laterality Date    back fusion   1995    lumbar spine    CATHETERIZATION OF BOTH LEFT AND RIGHT HEART Bilateral 6/28/2018    Procedure: Heart Cath-Bilateral;  Surgeon: Gerardo Trevizo MD;  Location: [facility] CATH LAB;  Service: Cardiology;  Laterality: Bilateral;  RN PREOP 6/27/2018    CORONARY ARTERY BYPASS GRAFT (CABG) N/A 8/13/2018    Procedure: CABGx1;  Surgeon: Orville Payne MD;  Location: [facility] OR [address] [place] [address];  Service: Cardiovascular;  Laterality: N/A;    MEDIASTINAL MASS EXCISION  8/13/2018    Procedure: EXCISION, MASS, MEDIASTINUM;  Surgeon: Orville Payne MD;  Location: [facility] OR [address] [place] [address];  Service: Cardiovascular;;       Medications      Current Outpatient Medications:     albuterol (PROVENTIL/VENTOLIN HFA) 90 mcg/actuation inhaler, Inhale 2 puffs into the lungs every 6 (six) hours as needed for Wheezing. Rescue, Disp: 18 g, Rfl: 0    albuterol-ipratropium (DUO-NEB) 2.5 mg-0.5 mg/3 mL nebulizer solution, Take 3 mLs by nebulization every 4 (four) hours as needed for Wheezing or Shortness of Breath. Rescue, Disp: 75 mL, Rfl: 1    albuterol-ipratropium (DUO-NEB) 2.5 mg-0.5 mg/3 mL nebulizer solution, Take 3 mLs by nebulization every 6 (six) hours as needed for Wheezing. Rescue, Disp: 120 each, Rfl: 6    aspirin (ECOTRIN) 325 MG EC tablet, Take 325 mg by mouth once daily., Disp: , Rfl:     atorvastatin (LIPITOR) 40 MG tablet, Take 40 mg by mouth., Disp: , Rfl:     pulse oximeter (PULSE OXIMETER) device, by Apply Externally route 2 (two) times a day. Use twice daily at 8 AM and 3 PM and record the value in MyChart as directed., Disp: 1 each, Rfl: 0    amLODIPine (NORVASC) 5 MG tablet, Take 1 tablet (5 mg total) by mouth once daily., Disp: 90 tablet, Rfl: 3    azelastine (ASTELIN) 137 mcg (0.1 %) nasal spray, 1 spray (137 mcg total) by Nasal route 2 (two) times daily. for 7 days, Disp: 30 mL, Rfl: 0    azithromycin  (ZITHROMAX Z-ALEXA) 250 MG tablet, Take 2 po today then 1 a day for 4 days (Patient not taking: Reported on 3/6/2024), Disp: 6 tablet, Rfl: 0    carvedilol (COREG) 12.5 MG tablet, Take 2 tablets (25 mg total) by mouth 2 (two) times daily with meals., Disp: 360 tablet, Rfl: 3    fluticasone-umeclidin-vilanter (TRELEGY ELLIPTA) 100-62.5-25 mcg DsDv, Inhale 1 puff into the lungs once daily., Disp: 3 each, Rfl: 5    Current Facility-Administered Medications:     albuterol sulfate nebulizer solution 2.5 mg, 2.5 mg, Nebulization, 1 time in Clinic/HOD, Alex Ornelas MD    Allergies    Review of patient's allergies indicates:  No Known Allergies    SocHx    Social History     Tobacco Use   Smoking Status Every Day    Current packs/day: 0.25    Average packs/day: 0.3 packs/day for 30.0 years (7.5 ttl pk-yrs)    Types: Cigarettes   Smokeless Tobacco Never   Tobacco Comments    9/27/18 ---only a few cigs per day, trying to quit    Smokes 6 - 8 daily       Social History     Substance and Sexual Activity   Alcohol Use Yes    Comment: occaSIONAL       FMHx    Family History   Problem Relation Age of Onset    Lung cancer Father          Review of Systems  Review of Systems   Constitutional:  Negative for chills, diaphoresis, fever, malaise/fatigue and weight loss.   HENT:  Negative for congestion.    Eyes:  Negative for pain.   Respiratory:  Positive for cough and shortness of breath. Negative for hemoptysis, sputum production, wheezing and stridor.    Cardiovascular:  Negative for chest pain, palpitations, orthopnea, claudication, leg swelling and PND.   Gastrointestinal:  Negative for abdominal pain, constipation, diarrhea, heartburn, nausea and vomiting.   Genitourinary:  Negative for dysuria, frequency and urgency.   Musculoskeletal:  Negative for falls and myalgias.   Neurological:  Negative for sensory change, focal weakness and weakness.   Psychiatric/Behavioral:  Negative for depression, substance abuse and suicidal  ideas. The patient is not nervous/anxious.        Physical Exam    Vitals:    03/06/24 1026   BP: 132/80   BP Location: Left arm   Patient Position: Sitting   BP Method: Medium (Manual)   Pulse: 88   SpO2: 97%   Weight: 76.4 kg (168 lb 6.4 oz)       Physical Exam  Vitals and nursing note reviewed.   Constitutional:       General: He is not in acute distress.     Appearance: Normal appearance. He is well-developed. He is not ill-appearing, toxic-appearing or diaphoretic.   HENT:      Head: Normocephalic and atraumatic.      Right Ear: External ear normal.      Left Ear: External ear normal.      Nose: Nose normal.      Mouth/Throat:      Mouth: Mucous membranes are moist.      Pharynx: Oropharynx is clear. No oropharyngeal exudate or posterior oropharyngeal erythema.   Eyes:      General: No scleral icterus.        Right eye: No discharge.         Left eye: No discharge.      Extraocular Movements: Extraocular movements intact.      Conjunctiva/sclera: Conjunctivae normal.      Pupils: Pupils are equal, round, and reactive to light.   Neck:      Thyroid: No thyromegaly.      Vascular: No JVD.      Trachea: No tracheal deviation.   Cardiovascular:      Rate and Rhythm: Normal rate and regular rhythm.      Heart sounds: Normal heart sounds. No murmur heard.     No friction rub. No gallop.      Comments: + AICD  Pulmonary:      Effort: Pulmonary effort is normal. No respiratory distress.      Breath sounds: Normal breath sounds. No stridor. No wheezing, rhonchi or rales.      Comments: Decreased BS throughout  No acc m use  Chest:      Chest wall: No tenderness.   Abdominal:      General: Bowel sounds are normal. There is no distension.      Palpations: Abdomen is soft.   Musculoskeletal:         General: No tenderness. Normal range of motion.      Cervical back: Normal range of motion and neck supple.      Right lower leg: No edema.      Left lower leg: No edema.   Lymphadenopathy:      Cervical: No cervical  adenopathy.   Skin:     Capillary Refill: Capillary refill takes less than 2 seconds.   Neurological:      General: No focal deficit present.      Mental Status: He is alert and oriented to person, place, and time. Mental status is at baseline.      Cranial Nerves: No cranial nerve deficit.      Motor: No weakness.      Deep Tendon Reflexes: Reflexes are normal and symmetric.   Psychiatric:         Behavior: Behavior normal.         Labs    Lab Results   Component Value Date    WBC 23.27 (H) 10/01/2023    HGB 15.1 10/01/2023    HCT 48.4 10/01/2023     10/01/2023       Sodium   Date Value Ref Range Status   10/01/2023 137 136 - 145 mmol/L Final     Potassium   Date Value Ref Range Status   10/01/2023 4.7 3.5 - 5.1 mmol/L Final     Chloride   Date Value Ref Range Status   10/01/2023 101 95 - 110 mmol/L Final     CO2   Date Value Ref Range Status   10/01/2023 32 (H) 23 - 29 mmol/L Final     Glucose   Date Value Ref Range Status   10/01/2023 148 (H) 70 - 110 mg/dL Final     BUN   Date Value Ref Range Status   10/01/2023 21 8 - 23 mg/dL Final     Creatinine   Date Value Ref Range Status   10/01/2023 0.9 0.5 - 1.4 mg/dL Final     Calcium   Date Value Ref Range Status   10/01/2023 9.5 8.7 - 10.5 mg/dL Final     Total Protein   Date Value Ref Range Status   10/01/2023 6.9 6.0 - 8.4 g/dL Final     Albumin   Date Value Ref Range Status   10/01/2023 4.0 3.5 - 5.2 g/dL Final     Total Bilirubin   Date Value Ref Range Status   10/01/2023 0.3 0.1 - 1.0 mg/dL Final     Comment:     For infants and newborns, interpretation of results should be based  on gestational age, weight and in agreement with clinical  observations.    Premature Infant recommended reference ranges:  Up to 24 hours.............<8.0 mg/dL  Up to 48 hours............<12.0 mg/dL  3-5 days..................<15.0 mg/dL  6-29 days.................<15.0 mg/dL       Alkaline Phosphatase   Date Value Ref Range Status   10/01/2023 68 55 - 135 U/L Final     AST    Date Value Ref Range Status   10/01/2023 12 10 - 40 U/L Final     ALT   Date Value Ref Range Status   10/01/2023 11 10 - 44 U/L Final     Anion Gap   Date Value Ref Range Status   10/01/2023 4 (L) 8 - 16 mmol/L Final       Xrays    CT chest (10/20/23)  Lung-RADS Category:  2 - Benign Appearance or Behavior - continue annual screening with LDCT in 12 months.  Clinically or potentially clinically significant non lung cancer finding:  S - Significant.  Prior Lung Cancer Modifier:  No history of prior lung cancer.Dictation #1  MRN:[id number]  CSN:[id number]   Unchanged left pulmonary nodule with no new pulmonary findings.  Pulmonary emphysema.  Cardiac post treatment change and coronary artery disease.    Impression/Plan    Problem List Items Addressed This Visit          Pulmonary    COPD (chronic obstructive pulmonary disease) - Primary     Continue present medications.  Will refill medications as needed.  Instructed patient to contact us with any issues concerning their medications (cost, reactions, etc.).  Have discussed with patient about inciting conditions which may exacerbate their disease.  We did discuss possible new therapies or de-escalation of therapy (if appropriate).  Asked patient if they were interested in pursuing pulmonary rehabilitation.  All questions answered  RTC 6 months  Patient instructed that they are to call if symptoms change or new issues develop prior to their next visit.  Will change to TRELEGY                Other    Tobacco abuse     He is working on steadily cutting down the cigarettes.                        Benito Carr MD

## 2024-09-04 ENCOUNTER — OFFICE VISIT (OUTPATIENT)
Dept: PULMONOLOGY | Facility: CLINIC | Age: 64
End: 2024-09-04
Payer: MEDICARE

## 2024-09-04 VITALS
OXYGEN SATURATION: 96 % | BODY MASS INDEX: 24.96 KG/M2 | DIASTOLIC BLOOD PRESSURE: 80 MMHG | WEIGHT: 169 LBS | HEART RATE: 102 BPM | SYSTOLIC BLOOD PRESSURE: 142 MMHG

## 2024-09-04 DIAGNOSIS — Z72.0 TOBACCO ABUSE: ICD-10-CM

## 2024-09-04 DIAGNOSIS — J44.9 CHRONIC OBSTRUCTIVE PULMONARY DISEASE, UNSPECIFIED COPD TYPE: Primary | ICD-10-CM

## 2024-09-04 PROCEDURE — 99214 OFFICE O/P EST MOD 30 MIN: CPT | Mod: S$GLB,,, | Performed by: INTERNAL MEDICINE

## 2024-09-04 PROCEDURE — 1159F MED LIST DOCD IN RCRD: CPT | Mod: CPTII,S$GLB,, | Performed by: INTERNAL MEDICINE

## 2024-09-04 PROCEDURE — 3077F SYST BP >= 140 MM HG: CPT | Mod: CPTII,S$GLB,, | Performed by: INTERNAL MEDICINE

## 2024-09-04 PROCEDURE — 1160F RVW MEDS BY RX/DR IN RCRD: CPT | Mod: CPTII,S$GLB,, | Performed by: INTERNAL MEDICINE

## 2024-09-04 PROCEDURE — 3008F BODY MASS INDEX DOCD: CPT | Mod: CPTII,S$GLB,, | Performed by: INTERNAL MEDICINE

## 2024-09-04 PROCEDURE — 3079F DIAST BP 80-89 MM HG: CPT | Mod: CPTII,S$GLB,, | Performed by: INTERNAL MEDICINE

## 2024-09-04 RX ORDER — AMLODIPINE BESYLATE 5 MG/1
5 TABLET ORAL DAILY
Qty: 90 TABLET | Refills: 3 | Status: SHIPPED | OUTPATIENT
Start: 2024-09-04

## 2024-09-04 RX ORDER — AZITHROMYCIN 250 MG/1
TABLET, FILM COATED ORAL
Qty: 6 TABLET | Refills: 0 | Status: SHIPPED | OUTPATIENT
Start: 2024-09-04

## 2024-09-04 RX ORDER — FLUTICASONE FUROATE, UMECLIDINIUM BROMIDE AND VILANTEROL TRIFENATATE 100; 62.5; 25 UG/1; UG/1; UG/1
1 POWDER RESPIRATORY (INHALATION) DAILY
Qty: 3 EACH | Refills: 5 | Status: SHIPPED | OUTPATIENT
Start: 2024-09-04

## 2024-09-04 RX ORDER — PREDNISONE 10 MG/1
TABLET ORAL
Qty: 30 TABLET | Refills: 0 | Status: SHIPPED | OUTPATIENT
Start: 2024-09-04

## 2024-09-04 NOTE — ASSESSMENT & PLAN NOTE
Continue present medications.  Will refill medications as needed.  Instructed patient to contact us with any issues concerning their medications (cost, reactions, etc.).  Have discussed with patient about inciting conditions which may exacerbate their disease.  We did discuss possible new therapies or de-escalation of therapy (if appropriate).  Asked patient if they were interested in pursuing pulmonary rehabilitation.  All questions answered  RTC 6 months  Patient instructed that they are to call if symptoms change or new issues develop prior to their next visit.  Will give prednisone taper and zpack to get him clear for his trip

## 2024-12-03 NOTE — PROGRESS NOTES
"Office Visit Note *     Patient Name: Anjum Campbell  MRN: [id number]  : 1960      Reason for visit: COPD    HPI:     10/27/2023 - Pt here to be evaluated for COPD, has seen Helen Banks but wishes to change care.  He has had PFT showing severe COPD, nonhypoxemic walk test and LDSCT chest reviewed (will need repeat 1 year).  He was in hospital recently and is feeling better.  He has just started on BREZTRI and has prn ALBUTEROL (we discussed the roles of controlling and rescue medications).  He has been a smoker for about 40 years and about 3/4 PPD (though cutting back since his hospital stay).  We discussed the need to fully stop smoking and he is considering trying to "cold turkey" or at least get to < 1/2 PPD by next visit.    2023 - Here for follow up, starting a few days has had some increased cough with mucus and was "coming down" with something.  He has had some sweats at night as well.  Breathing has been OK but maybe a little more breathless.  He has cut his smoking down to about 1/2 PPD.  His Alpha-1 screen was normal - MM    3/6/2024 - Here for follow up, Pt is currently stable on present medications with no recent increases in their symptoms or use of rescue medications.  Since our last visit there have been no hospitalizations or ER visits for their respiratory issues and there does not seem to be anything to suggest unrecognized exacerbations.  I have reviewed the medical regimen and re-educated the pt on the role of rescue and controlling medications.  Inhaler technique and understanding seems adequate.  The patient reports no issues with any of there medications for their COPD.  Refills will be taken care of as needed.  All questions answered.  He is down to 8 cig/d  (he was hoping to be down to 5 per day).  He would like to change to TRELEGY.    2024 - Here for follow up, Pt is currently stable on present medications with no recent increases in their symptoms or use of rescue " medications.  Since our last visit there have been no hospitalizations or ER visits for their respiratory issues and there does not seem to be anything to suggest unrecognized exacerbations.  I have reviewed the medical regimen and re-educated the pt on the role of rescue and controlling medications.  Inhaler technique and understanding seems adequate.  The patient reports no issues with any of there medications for their COPD.  Refills will be taken care of as needed.  All questions answered.  For the last 2 days he has had some increased cough and hoarse voice and some increased dyspnea.  No fever, chills, sweats.  Sputum is fairly clear. He is about to go on [employer].  He is down to about 3-5 cigarettes per day and we discussed this.    Past Medical History    Past Medical History:   Diagnosis Date    Back injury 1994    Chest pain     CHF (congestive heart failure)     Hypertension     Thymoma 2018       Past Surgical History    Past Surgical History:   Procedure Laterality Date    back fusion   1995    lumbar spine    CATHETERIZATION OF BOTH LEFT AND RIGHT HEART Bilateral 6/28/2018    Procedure: Heart Cath-Bilateral;  Surgeon: Gerardo Trevizo MD;  Location: [facility] CATH LAB;  Service: Cardiology;  Laterality: Bilateral;  RN PREOP 6/27/2018    CORONARY ARTERY BYPASS GRAFT (CABG) N/A 8/13/2018    Procedure: CABGx1;  Surgeon: Orville Payne MD;  Location: [facility] OR [address] [place] [address];  Service: Cardiovascular;  Laterality: N/A;    MEDIASTINAL MASS EXCISION  8/13/2018    Procedure: EXCISION, MASS, MEDIASTINUM;  Surgeon: Orville Payne MD;  Location: [facility] OR [address] [place] [address];  Service: Cardiovascular;;       Medications      Current Outpatient Medications:     albuterol (PROVENTIL/VENTOLIN HFA) 90 mcg/actuation inhaler, Inhale 2 puffs into the lungs every 6 (six) hours as needed for Wheezing. Rescue, Disp: 18 g, Rfl: 0    albuterol-ipratropium (DUO-NEB) 2.5 mg-0.5 mg/3 mL nebulizer solution, Take 3 mLs by nebulization  every 4 (four) hours as needed for Wheezing or Shortness of Breath. Rescue, Disp: 75 mL, Rfl: 1    albuterol-ipratropium (DUO-NEB) 2.5 mg-0.5 mg/3 mL nebulizer solution, Take 3 mLs by nebulization every 6 (six) hours as needed for Wheezing. Rescue, Disp: 120 each, Rfl: 6    aspirin (ECOTRIN) 325 MG EC tablet, Take 325 mg by mouth once daily., Disp: , Rfl:     atorvastatin (LIPITOR) 40 MG tablet, Take 40 mg by mouth., Disp: , Rfl:     pulse oximeter (PULSE OXIMETER) device, by Apply Externally route 2 (two) times a day. Use twice daily at 8 AM and 3 PM and record the value in MyChart as directed., Disp: 1 each, Rfl: 0    amLODIPine (NORVASC) 5 MG tablet, Take 1 tablet (5 mg total) by mouth once daily., Disp: 90 tablet, Rfl: 3    azelastine (ASTELIN) 137 mcg (0.1 %) nasal spray, 1 spray (137 mcg total) by Nasal route 2 (two) times daily. for 7 days, Disp: 30 mL, Rfl: 0    azithromycin (ZITHROMAX Z-ALEXA) 250 MG tablet, Take 2 po today then 1 a day for 4 days (Patient not taking: Reported on 3/6/2024), Disp: 6 tablet, Rfl: 0    azithromycin (ZITHROMAX Z-ALEXA) 250 MG tablet, Take 2 po today then 1 a day for 4 days, Disp: 6 tablet, Rfl: 0    carvedilol (COREG) 12.5 MG tablet, Take 2 tablets (25 mg total) by mouth 2 (two) times daily with meals., Disp: 360 tablet, Rfl: 3    fluticasone-umeclidin-vilanter (TRELEGY ELLIPTA) 100-62.5-25 mcg DsDv, Inhale 1 puff into the lungs once daily., Disp: 3 each, Rfl: 5    predniSONE (DELTASONE) 10 MG tablet, Take 3 a day x 5 days then 2 a day x 5 days then 1 a day x 5 days, Disp: 30 tablet, Rfl: 0    Current Facility-Administered Medications:     albuterol sulfate nebulizer solution 2.5 mg, 2.5 mg, Nebulization, 1 time in Clinic/HOD, Alex Ornelas MD    Allergies    Review of patient's allergies indicates:  No Known Allergies    SocHx    Social History     Tobacco Use   Smoking Status Every Day    Current packs/day: 0.25    Average packs/day: 0.3 packs/day for 30.0 years (7.5 ttl  pk-yrs)    Types: Cigarettes   Smokeless Tobacco Never   Tobacco Comments    9/27/18 ---only a few cigs per day, trying to quit    Smokes 6 - 8 daily       Social History     Substance and Sexual Activity   Alcohol Use Yes    Comment: occaSIONAL       FMHx    Family History   Problem Relation Name Age of Onset    Lung cancer Father           Review of Systems  Review of Systems   Constitutional:  Negative for chills, diaphoresis, fever, malaise/fatigue and weight loss.   HENT:  Negative for congestion.    Eyes:  Negative for pain.   Respiratory:  Positive for cough and shortness of breath. Negative for hemoptysis, sputum production, wheezing and stridor.    Cardiovascular:  Negative for chest pain, palpitations, orthopnea, claudication, leg swelling and PND.   Gastrointestinal:  Negative for abdominal pain, constipation, diarrhea, heartburn, nausea and vomiting.   Genitourinary:  Negative for dysuria, frequency and urgency.   Musculoskeletal:  Negative for falls and myalgias.   Neurological:  Negative for sensory change, focal weakness and weakness.   Psychiatric/Behavioral:  Negative for depression, substance abuse and suicidal ideas. The patient is not nervous/anxious.        Physical Exam    Vitals:    09/04/24 1041   BP: (!) 142/80   BP Location: Left arm   Patient Position: Sitting   BP Method: Medium (Manual)   Pulse: 102   SpO2: 96%   Weight: 76.7 kg (169 lb)       Physical Exam  Vitals and nursing note reviewed.   Constitutional:       General: He is not in acute distress.     Appearance: Normal appearance. He is well-developed. He is not ill-appearing, toxic-appearing or diaphoretic.   HENT:      Head: Normocephalic and atraumatic.      Right Ear: External ear normal.      Left Ear: External ear normal.      Nose: Nose normal.      Mouth/Throat:      Mouth: Mucous membranes are moist.      Pharynx: Oropharynx is clear. No oropharyngeal exudate or posterior oropharyngeal erythema.   Eyes:      General: No  scleral icterus.        Right eye: No discharge.         Left eye: No discharge.      Extraocular Movements: Extraocular movements intact.      Conjunctiva/sclera: Conjunctivae normal.      Pupils: Pupils are equal, round, and reactive to light.   Neck:      Thyroid: No thyromegaly.      Vascular: No JVD.      Trachea: No tracheal deviation.   Cardiovascular:      Rate and Rhythm: Normal rate and regular rhythm.      Heart sounds: Normal heart sounds. No murmur heard.     No friction rub. No gallop.      Comments: + AICD  Pulmonary:      Effort: Pulmonary effort is normal. No respiratory distress.      Breath sounds: Normal breath sounds. No stridor. No wheezing, rhonchi or rales.      Comments: Decreased BS throughout  No acc m use  Chest:      Chest wall: No tenderness.   Abdominal:      General: Bowel sounds are normal. There is no distension.      Palpations: Abdomen is soft.   Musculoskeletal:         General: No tenderness. Normal range of motion.      Cervical back: Normal range of motion and neck supple.      Right lower leg: No edema.      Left lower leg: No edema.   Lymphadenopathy:      Cervical: No cervical adenopathy.   Skin:     Capillary Refill: Capillary refill takes less than 2 seconds.   Neurological:      General: No focal deficit present.      Mental Status: He is alert and oriented to person, place, and time. Mental status is at baseline.      Cranial Nerves: No cranial nerve deficit.      Motor: No weakness.      Deep Tendon Reflexes: Reflexes are normal and symmetric.   Psychiatric:         Behavior: Behavior normal.         Labs    Lab Results   Component Value Date    WBC 23.27 (H) 10/01/2023    HGB 15.1 10/01/2023    HCT 48.4 10/01/2023     10/01/2023       Sodium   Date Value Ref Range Status   10/01/2023 137 136 - 145 mmol/L Final     Potassium   Date Value Ref Range Status   10/01/2023 4.7 3.5 - 5.1 mmol/L Final     Chloride   Date Value Ref Range Status   10/01/2023 101 95 - 110  mmol/L Final     CO2   Date Value Ref Range Status   10/01/2023 32 (H) 23 - 29 mmol/L Final     Glucose   Date Value Ref Range Status   10/01/2023 148 (H) 70 - 110 mg/dL Final     BUN   Date Value Ref Range Status   10/01/2023 21 8 - 23 mg/dL Final     Creatinine   Date Value Ref Range Status   10/01/2023 0.9 0.5 - 1.4 mg/dL Final     Calcium   Date Value Ref Range Status   10/01/2023 9.5 8.7 - 10.5 mg/dL Final     Total Protein   Date Value Ref Range Status   10/01/2023 6.9 6.0 - 8.4 g/dL Final     Albumin   Date Value Ref Range Status   10/01/2023 4.0 3.5 - 5.2 g/dL Final     Total Bilirubin   Date Value Ref Range Status   10/01/2023 0.3 0.1 - 1.0 mg/dL Final     Comment:     For infants and newborns, interpretation of results should be based  on gestational age, weight and in agreement with clinical  observations.    Premature Infant recommended reference ranges:  Up to 24 hours.............<8.0 mg/dL  Up to 48 hours............<12.0 mg/dL  3-5 days..................<15.0 mg/dL  6-29 days.................<15.0 mg/dL       Alkaline Phosphatase   Date Value Ref Range Status   10/01/2023 68 55 - 135 U/L Final     AST   Date Value Ref Range Status   10/01/2023 12 10 - 40 U/L Final     ALT   Date Value Ref Range Status   10/01/2023 11 10 - 44 U/L Final     Anion Gap   Date Value Ref Range Status   10/01/2023 4 (L) 8 - 16 mmol/L Final       Xrays    CT chest (10/20/23)  Lung-RADS Category:  2 - Benign Appearance or Behavior - continue annual screening with LDCT in 12 months.  Clinically or potentially clinically significant non lung cancer finding:  S - Significant.  Prior Lung Cancer Modifier:  No history of prior lung cancer.Dictation #1  MRN:[id number]  [facility]:[id number]   Unchanged left pulmonary nodule with no new pulmonary findings.  Pulmonary emphysema.  Cardiac post treatment change and coronary artery disease.    Impression/Plan    Problem List Items Addressed This Visit          Pulmonary    COPD (chronic  obstructive pulmonary disease) - Primary     Continue present medications.  Will refill medications as needed.  Instructed patient to contact us with any issues concerning their medications (cost, reactions, etc.).  Have discussed with patient about inciting conditions which may exacerbate their disease.  We did discuss possible new therapies or de-escalation of therapy (if appropriate).  Asked patient if they were interested in pursuing pulmonary rehabilitation.  All questions answered  RTC 6 months  Patient instructed that they are to call if symptoms change or new issues develop prior to their next visit.  Will give prednisone taper and zpack to get him clear for his trip                Other    Tobacco abuse     He is working on steadily cutting down the cigarettes.  We discussed quitting                          Benito Carr MD     operating room

## 2025-02-25 ENCOUNTER — OFFICE VISIT (OUTPATIENT)
Dept: URGENT CARE | Facility: CLINIC | Age: 65
End: 2025-02-25
Payer: MEDICARE

## 2025-02-25 VITALS
RESPIRATION RATE: 20 BRPM | BODY MASS INDEX: 25.48 KG/M2 | HEART RATE: 105 BPM | HEIGHT: 69 IN | SYSTOLIC BLOOD PRESSURE: 132 MMHG | WEIGHT: 172 LBS | DIASTOLIC BLOOD PRESSURE: 84 MMHG | TEMPERATURE: 99 F | OXYGEN SATURATION: 96 %

## 2025-02-25 DIAGNOSIS — Z20.822 COVID-19 VIRUS NOT DETECTED: ICD-10-CM

## 2025-02-25 DIAGNOSIS — R89.4 INFLUENZA A VIRUS NOT DETECTED: ICD-10-CM

## 2025-02-25 DIAGNOSIS — J44.1 COPD WITH ACUTE EXACERBATION: Primary | ICD-10-CM

## 2025-02-25 DIAGNOSIS — R09.89 CHEST CONGESTION: ICD-10-CM

## 2025-02-25 DIAGNOSIS — R05.9 COUGH, UNSPECIFIED TYPE: ICD-10-CM

## 2025-02-25 LAB
CTP QC/QA: YES
CTP QC/QA: YES
FLUAV AG NPH QL: NEGATIVE
FLUBV AG NPH QL: NEGATIVE
SARS CORONAVIRUS 2 ANTIGEN: NEGATIVE

## 2025-02-25 PROCEDURE — 87811 SARS-COV-2 COVID19 W/OPTIC: CPT | Mod: QW,S$GLB,,

## 2025-02-25 PROCEDURE — 87804 INFLUENZA ASSAY W/OPTIC: CPT | Mod: QW,,,

## 2025-02-25 PROCEDURE — 99214 OFFICE O/P EST MOD 30 MIN: CPT | Mod: S$GLB,,,

## 2025-02-25 PROCEDURE — 71046 X-RAY EXAM CHEST 2 VIEWS: CPT | Mod: S$GLB,,, | Performed by: RADIOLOGY

## 2025-02-25 RX ORDER — AZITHROMYCIN 250 MG/1
TABLET, FILM COATED ORAL
Qty: 6 TABLET | Refills: 0 | Status: SHIPPED | OUTPATIENT
Start: 2025-02-25

## 2025-02-25 RX ORDER — PREDNISONE 20 MG/1
20 TABLET ORAL 2 TIMES DAILY
Qty: 10 TABLET | Refills: 0 | Status: SHIPPED | OUTPATIENT
Start: 2025-02-25 | End: 2025-03-02

## 2025-02-25 NOTE — PATIENT INSTRUCTIONS
ER precautions worsening symptoms.    Finish entire treatment.  Continue supportive treatments to include home medications and prescribed medications.  Mucinex as needed for nonproductive cough.  When you take Mucinex make sure you take plenty of fluid so medication is effective.

## 2025-02-25 NOTE — PROGRESS NOTES
"Subjective:      Patient ID: Anjum Campbell is a 64 y.o. male.    Vitals:  height is 5' 9" (1.753 m) and weight is 78 kg (172 lb). His oral temperature is 99.2 °F (37.3 °C). His blood pressure is 132/84 and his pulse is 105. His respiration is 20 and oxygen saturation is 96%.     Chief Complaint: Cough    Patient clinic with a complaint of subjective fever, chills, headache, congestion, postnasal drip, sore throat, productive cough, and body aches for the past week.  Patient has a history of CHF, and COPD.  He has been using his inhalers, and breathing treatments for symptom management.  He has chronic shortness for breath, and chronic dyspnea with exertion.  His cough is mixed between clear, yellow, and green to yellow phlegm.  Denies known ill contacts.  He is noncompliant with CHF treatment.  Does not take his diuretics.    Cough  This is a new problem. The current episode started 1 to 4 weeks ago. The problem has been gradually worsening. The cough is Productive of sputum. Associated symptoms include chills, a fever (Subjective), headaches, myalgias, nasal congestion, postnasal drip and shortness of breath. Pertinent negatives include no chest pain or wheezing. Associated symptoms comments: Chest congestion  . The symptoms are aggravated by lying down. He has tried nothing for the symptoms. The treatment provided no relief. His past medical history is significant for COPD.       Constitution: Positive for chills and fever (Subjective).   HENT:  Positive for congestion and postnasal drip.    Neck: neck negative.   Cardiovascular:  Positive for sob on exertion. Negative for chest pain and leg swelling.   Eyes: Negative.    Respiratory:  Positive for cough, sputum production, COPD and shortness of breath. Negative for wheezing.    Gastrointestinal: Negative.    Endocrine: negative.   Genitourinary: Negative.    Musculoskeletal:  Positive for muscle ache.   Skin: Negative.    Allergic/Immunologic: Positive for " chronic cough.   Neurological:  Positive for headaches.   Hematologic/Lymphatic: Negative.    Psychiatric/Behavioral: Negative.        Objective:     Physical Exam   Constitutional: He is oriented to person, place, and time. He is cooperative.  Non-toxic appearance. He does not appear ill. No distress.   HENT:   Head: Normocephalic and atraumatic.   Ears:   Right Ear: External ear normal.   Left Ear: External ear normal.   Nose: Nose normal.   Mouth/Throat: Uvula is midline, oropharynx is clear and moist and mucous membranes are normal. Mucous membranes are moist. Oropharynx is clear.   Eyes: Conjunctivae and lids are normal. Pupils are equal, round, and reactive to light. Extraocular movement intact   Neck: Trachea normal and phonation normal. Neck supple.   Cardiovascular: Normal rate, regular rhythm, normal heart sounds and normal pulses.   Pulmonary/Chest: Effort normal. No stridor. No tachypnea. No respiratory distress. He has decreased breath sounds. He has no wheezes. He has no rhonchi. He has no rales.   Abdominal: Normal appearance.   Musculoskeletal:      Right lower leg: No edema.      Left lower leg: No edema.   Lymphadenopathy:     He has no cervical adenopathy.   Neurological: no focal deficit. He is alert, oriented to person, place, and time and at baseline. He has normal motor skills, normal sensation and intact cranial nerves (2-12). Gait and coordination normal. GCS eye subscore is 4. GCS verbal subscore is 5. GCS motor subscore is 6.   Skin: Skin is warm, dry, not diaphoretic and no rash. Capillary refill takes 2 to 3 seconds.   Psychiatric: He experiences Normal attention and Normal perception. His speech is normal and behavior is normal. Mood, judgment and thought content normal.   Nursing note and vitals reviewed.    Assessment:     No diagnosis found.    Plan:       There are no diagnoses linked to this encounter.      Medical Decision Making:   History:   Old Medical Records: I decided to  obtain old medical records.  Old Records Summarized: records from clinic visits.  Initial Assessment:   Patient presenting with upper respiratory complaints has been present for near a week.  Subjective fevers at home.  Has not measured, but states he has chills.  He has been taking supportive treatments to include over-the-counter medications, and prescriptions.  Does have significant history of COPD, CHF, CAD, previous CABG, and previous MI. he is noncompliant with diuretics.  He has no lower extremity edema.  Lungs are grossly diminished.  He has no orthopnea.  No respiratory distress.  His vital signs are stable.  Specific return, and follow up instructions were discussed.  ER precautions also discussed with the patient and verbalized understanding.  Differential Diagnosis:   Pneumonia, COVID, influenza  Clinical Tests:   Lab Tests: Ordered and Reviewed  Radiological Study: Ordered and Reviewed  Urgent Care Management:  Chest x-ray independently reviewed no acute disease.  COVID and influenza also negative.  Additional MDM:     Heart Failure Score:   COPD = Yes

## 2025-06-12 NOTE — ED NOTES
Health Maintenance       Medicare Advantage- Medicare Wellness Visit (Yearly - January to December)  Overdue since 1/1/2025    COVID-19 Vaccine (1)  Never done    Shingles Vaccine (1 of 2)  Never done    Hepatitis B Vaccine (1 of 3 - 19+ 3-dose series)  Never done    Pneumococcal Vaccine 50+ (1 of 2 - PCV)  Never done    Colorectal Cancer Screening  Order placed this encounter           Following review of the above:  Patient is not proceeding with: COVID-19, Hep B, Pneumococcal, and Shingles    Note: Refer to final orders and clinician documentation.         Sling applied to patient.

## (undated) DEVICE — RETRACTOR OCTOBASE INSERT HOLD

## (undated) DEVICE — PAD K-THERMIA 24IN X 60IN

## (undated) DEVICE — WIRE INTRAMYOCARDIAL TEMP

## (undated) DEVICE — SUT VICRYL BR 1 GEN 27 CT-1

## (undated) DEVICE — PLEDGET SUT SOFT 3/8X3/16X1/16

## (undated) DEVICE — BANDAGE ACE ELASTIC 6"

## (undated) DEVICE — SUT 4-0 12-18IN SILK BLACK

## (undated) DEVICE — ELECTRODE REM PLYHSV RETURN 9

## (undated) DEVICE — TUBING HF INSUFFLATION W/ FLTR

## (undated) DEVICE — SEE ITEM #153244

## (undated) DEVICE — SUT PROLENE 7-0 BV-1 30

## (undated) DEVICE — SYS VIRTUOSAPH PLUS EVM

## (undated) DEVICE — Device

## (undated) DEVICE — CLOSURE SKIN STERI STRIP 1/2X4

## (undated) DEVICE — DRAPE SLUSH WARMER WITH DISC

## (undated) DEVICE — DRESSING TRANS 4X4 TEGADERM

## (undated) DEVICE — BLADE 4IN EDGE INSULATED

## (undated) DEVICE — DRAPE SPLIT STERILE

## (undated) DEVICE — DRAIN CHEST DRY SUCTION

## (undated) DEVICE — SUT LIGACLIP SMALL XTRA

## (undated) DEVICE — SUT VICRYL 3-0 27 SH

## (undated) DEVICE — DRESSING AQUACEL SACRAL 9 X 9

## (undated) DEVICE — BLADE SAW STERNAL 5/BX

## (undated) DEVICE — TRAY HEART

## (undated) DEVICE — SUT PROLENE 4-0 SH BLU 36IN

## (undated) DEVICE — SEE MEDLINE ITEM 152515

## (undated) DEVICE — CANNULA VESSEL FREE FLOW

## (undated) DEVICE — SUT 2/0 36IN COATED VICRYL

## (undated) DEVICE — DRESSING TELFA STRL 4X3 LF

## (undated) DEVICE — SYR 3CC LUER LOC

## (undated) DEVICE — SUT 6 18IN STEEL MONO CCS

## (undated) DEVICE — SUT SILK 2-0 SH 18IN BLACK

## (undated) DEVICE — PUNCH AORTIC 4.0MM 6/CASE

## (undated) DEVICE — KIT SAHARA DRAPE DRAW/LIFT

## (undated) DEVICE — DRESSING ADH ISLAND 3.6 X 14

## (undated) DEVICE — SEE MEDLINE ITEM 146417

## (undated) DEVICE — SEE MEDLINE ITEM 157117

## (undated) DEVICE — SUT PROLENE 4-0 RB-1 BL MO

## (undated) DEVICE — SPONGE GAUZE 16PLY 4X4

## (undated) DEVICE — INSERTS STEALTH FIBRA SIZE 5

## (undated) DEVICE — NDL 18GA X1 1/2 REG BEVEL

## (undated) DEVICE — GAUZE SPONGE 4X4 12PLY

## (undated) DEVICE — SET DECANTER MEDICHOICE

## (undated) DEVICE — BLOWER MISTER

## (undated) DEVICE — SUT SILK BLK BR. 2 2-60

## (undated) DEVICE — CLIP SPRING 6MM

## (undated) DEVICE — SUT PROLENE 5-0 BL C-1 4-24